# Patient Record
Sex: FEMALE | Race: WHITE | NOT HISPANIC OR LATINO | Employment: OTHER | URBAN - METROPOLITAN AREA
[De-identification: names, ages, dates, MRNs, and addresses within clinical notes are randomized per-mention and may not be internally consistent; named-entity substitution may affect disease eponyms.]

---

## 2017-01-04 PROBLEM — M81.0 OSTEOPOROSIS: Status: ACTIVE | Noted: 2017-01-04

## 2017-01-04 RX ORDER — SODIUM CHLORIDE 9 MG/ML
250 INJECTION, SOLUTION INTRAVENOUS ONCE
Status: CANCELLED | OUTPATIENT
Start: 2017-01-05

## 2017-01-05 ENCOUNTER — HOSPITAL ENCOUNTER (OUTPATIENT)
Dept: INFUSION THERAPY | Facility: HOSPITAL | Age: 67
Discharge: HOME | End: 2017-01-05
Admitting: INTERNAL MEDICINE

## 2017-01-05 VITALS
TEMPERATURE: 95.2 F | BODY MASS INDEX: 24.13 KG/M2 | WEIGHT: 145 LBS | SYSTOLIC BLOOD PRESSURE: 133 MMHG | HEART RATE: 66 BPM | RESPIRATION RATE: 20 BRPM | OXYGEN SATURATION: 96 % | DIASTOLIC BLOOD PRESSURE: 73 MMHG

## 2017-01-05 DIAGNOSIS — M81.0 OSTEOPOROSIS: ICD-10-CM

## 2017-01-05 PROCEDURE — 96365 THER/PROPH/DIAG IV INF INIT: CPT

## 2017-01-05 PROCEDURE — 25010000002 ZOLEDRONIC ACID 5 MG/100ML SOLUTION: Performed by: INTERNAL MEDICINE

## 2017-01-05 RX ORDER — SODIUM CHLORIDE 9 MG/ML
250 INJECTION, SOLUTION INTRAVENOUS ONCE
Status: CANCELLED | OUTPATIENT
Start: 2017-01-05

## 2017-01-05 RX ORDER — ZOLEDRONIC ACID 5 MG/100ML
5 INJECTION, SOLUTION INTRAVENOUS ONCE
Status: COMPLETED | OUTPATIENT
Start: 2017-01-05 | End: 2017-01-05

## 2017-01-05 RX ORDER — ZOLEDRONIC ACID 5 MG/100ML
5 INJECTION, SOLUTION INTRAVENOUS ONCE
Status: CANCELLED | OUTPATIENT
Start: 2017-01-05

## 2017-01-05 RX ADMIN — ZOLEDRONIC ACID 5 MG: 5 INJECTION, SOLUTION INTRAVENOUS at 07:37

## 2017-06-02 ENCOUNTER — HOSPITAL ENCOUNTER (OUTPATIENT)
Dept: CARDIOLOGY | Facility: HOSPITAL | Age: 67
Discharge: HOME | End: 2017-06-02
Attending: INTERNAL MEDICINE | Admitting: INTERNAL MEDICINE

## 2017-06-02 ENCOUNTER — OFFICE VISIT (OUTPATIENT)
Dept: CARDIOLOGY | Facility: CLINIC | Age: 67
End: 2017-06-02

## 2017-06-02 VITALS
WEIGHT: 144.4 LBS | SYSTOLIC BLOOD PRESSURE: 120 MMHG | DIASTOLIC BLOOD PRESSURE: 88 MMHG | HEART RATE: 61 BPM | HEIGHT: 65 IN | BODY MASS INDEX: 24.06 KG/M2

## 2017-06-02 VITALS
HEIGHT: 65 IN | DIASTOLIC BLOOD PRESSURE: 82 MMHG | SYSTOLIC BLOOD PRESSURE: 122 MMHG | BODY MASS INDEX: 24.16 KG/M2 | HEART RATE: 61 BPM | WEIGHT: 145 LBS

## 2017-06-02 DIAGNOSIS — I34.0 MITRAL VALVE INSUFFICIENCY: ICD-10-CM

## 2017-06-02 DIAGNOSIS — I34.0 NON-RHEUMATIC MITRAL REGURGITATION: Primary | ICD-10-CM

## 2017-06-02 LAB
ASCENDING AORTA: 2.9 CM
BH CV ECHO MEAS - ACS: 2 CM
BH CV ECHO MEAS - AO MAX PG (FULL): 4.3 MMHG
BH CV ECHO MEAS - AO MAX PG: 7.2 MMHG
BH CV ECHO MEAS - AO MEAN PG (FULL): 1.7 MMHG
BH CV ECHO MEAS - AO MEAN PG: 3.4 MMHG
BH CV ECHO MEAS - AO ROOT AREA (BSA CORRECTED): 2
BH CV ECHO MEAS - AO ROOT AREA: 8.9 CM^2
BH CV ECHO MEAS - AO ROOT DIAM: 3.4 CM
BH CV ECHO MEAS - AO V2 MAX: 133.8 CM/SEC
BH CV ECHO MEAS - AO V2 MEAN: 86.6 CM/SEC
BH CV ECHO MEAS - AO V2 VTI: 26.5 CM
BH CV ECHO MEAS - AVA(I,A): 1.5 CM^2
BH CV ECHO MEAS - AVA(I,D): 1.5 CM^2
BH CV ECHO MEAS - AVA(V,A): 1.5 CM^2
BH CV ECHO MEAS - AVA(V,D): 1.5 CM^2
BH CV ECHO MEAS - BSA(HAYCOCK): 1.7 M^2
BH CV ECHO MEAS - BSA: 1.7 M^2
BH CV ECHO MEAS - BZI_BMI: 24.1 KILOGRAMS/M^2
BH CV ECHO MEAS - BZI_METRIC_HEIGHT: 165.1 CM
BH CV ECHO MEAS - BZI_METRIC_WEIGHT: 65.8 KG
BH CV ECHO MEAS - CONTRAST EF (2CH): 66 ML/M^2
BH CV ECHO MEAS - CONTRAST EF 4CH: 64.5 ML/M^2
BH CV ECHO MEAS - EDV(MOD-SP2): 103 ML
BH CV ECHO MEAS - EDV(MOD-SP4): 121 ML
BH CV ECHO MEAS - EDV(TEICH): 109.7 ML
BH CV ECHO MEAS - EF(CUBED): 71.8 %
BH CV ECHO MEAS - EF(MOD-SP2): 66 %
BH CV ECHO MEAS - EF(MOD-SP4): 64.5 %
BH CV ECHO MEAS - EF(TEICH): 63.4 %
BH CV ECHO MEAS - ESV(MOD-SP2): 35 ML
BH CV ECHO MEAS - ESV(MOD-SP4): 43 ML
BH CV ECHO MEAS - ESV(TEICH): 40.1 ML
BH CV ECHO MEAS - FS: 34.5 %
BH CV ECHO MEAS - IVS/LVPW: 1.1
BH CV ECHO MEAS - IVSD: 1.1 CM
BH CV ECHO MEAS - LAT PEAK E' VEL: 6 CM/SEC
BH CV ECHO MEAS - LV DIASTOLIC VOL/BSA (35-75): 70.1 ML/M^2
BH CV ECHO MEAS - LV MASS(C)D: 196.3 GRAMS
BH CV ECHO MEAS - LV MASS(C)DI: 113.7 GRAMS/M^2
BH CV ECHO MEAS - LV MAX PG: 2.8 MMHG
BH CV ECHO MEAS - LV MEAN PG: 1.6 MMHG
BH CV ECHO MEAS - LV SYSTOLIC VOL/BSA (12-30): 24.9 ML/M^2
BH CV ECHO MEAS - LV V1 MAX: 84.4 CM/SEC
BH CV ECHO MEAS - LV V1 MEAN: 61.2 CM/SEC
BH CV ECHO MEAS - LV V1 VTI: 17 CM
BH CV ECHO MEAS - LVIDD: 4.8 CM
BH CV ECHO MEAS - LVIDS: 3.2 CM
BH CV ECHO MEAS - LVLD AP2: 6.2 CM
BH CV ECHO MEAS - LVLD AP4: 6.9 CM
BH CV ECHO MEAS - LVLS AP2: 5.4 CM
BH CV ECHO MEAS - LVLS AP4: 5.5 CM
BH CV ECHO MEAS - LVOT AREA (M): 2.3 CM^2
BH CV ECHO MEAS - LVOT AREA: 2.4 CM^2
BH CV ECHO MEAS - LVOT DIAM: 1.7 CM
BH CV ECHO MEAS - LVPWD: 1.1 CM
BH CV ECHO MEAS - MED PEAK E' VEL: 6 CM/SEC
BH CV ECHO MEAS - MR MAX PG: 161.4 MMHG
BH CV ECHO MEAS - MR MAX VEL: 635.3 CM/SEC
BH CV ECHO MEAS - MV A DUR: 0.1 SEC
BH CV ECHO MEAS - MV A MAX VEL: 108.7 CM/SEC
BH CV ECHO MEAS - MV DEC SLOPE: 291.6 CM/SEC^2
BH CV ECHO MEAS - MV DEC TIME: 0.27 SEC
BH CV ECHO MEAS - MV E MAX VEL: 78.6 CM/SEC
BH CV ECHO MEAS - MV E/A: 0.72
BH CV ECHO MEAS - MV MAX PG: 5.9 MMHG
BH CV ECHO MEAS - MV MEAN PG: 2 MMHG
BH CV ECHO MEAS - MV P1/2T MAX VEL: 78.6 CM/SEC
BH CV ECHO MEAS - MV P1/2T: 78.9 MSEC
BH CV ECHO MEAS - MV V2 MAX: 121.6 CM/SEC
BH CV ECHO MEAS - MV V2 MEAN: 65.7 CM/SEC
BH CV ECHO MEAS - MV V2 VTI: 37.2 CM
BH CV ECHO MEAS - MVA P1/2T LCG: 2.8 CM^2
BH CV ECHO MEAS - MVA(P1/2T): 2.8 CM^2
BH CV ECHO MEAS - MVA(VTI): 1.1 CM^2
BH CV ECHO MEAS - PA ACC TIME: 0.11 SEC
BH CV ECHO MEAS - PA MAX PG (FULL): 0.76 MMHG
BH CV ECHO MEAS - PA MAX PG: 1.6 MMHG
BH CV ECHO MEAS - PA PR(ACCEL): 31.5 MMHG
BH CV ECHO MEAS - PA V2 MAX: 62.4 CM/SEC
BH CV ECHO MEAS - PULM A REVS DUR: 0.1 SEC
BH CV ECHO MEAS - PULM A REVS VEL: 27.6 CM/SEC
BH CV ECHO MEAS - PULM DIAS VEL: 39.3 CM/SEC
BH CV ECHO MEAS - PULM S/D: 1.4
BH CV ECHO MEAS - PULM SYS VEL: 53.4 CM/SEC
BH CV ECHO MEAS - PVA(V,A): 2.3 CM^2
BH CV ECHO MEAS - PVA(V,D): 2.3 CM^2
BH CV ECHO MEAS - QP/QS: 0.77
BH CV ECHO MEAS - RAP SYSTOLE: 3 MMHG
BH CV ECHO MEAS - RV MAX PG: 0.8 MMHG
BH CV ECHO MEAS - RV MEAN PG: 0.51 MMHG
BH CV ECHO MEAS - RV V1 MAX: 44.6 CM/SEC
BH CV ECHO MEAS - RV V1 MEAN: 33.9 CM/SEC
BH CV ECHO MEAS - RV V1 VTI: 9.6 CM
BH CV ECHO MEAS - RVOT AREA: 3.3 CM^2
BH CV ECHO MEAS - RVOT DIAM: 2 CM
BH CV ECHO MEAS - RVSP: 15.5 MMHG
BH CV ECHO MEAS - SI(AO): 137.3 ML/M^2
BH CV ECHO MEAS - SI(CUBED): 47.2 ML/M^2
BH CV ECHO MEAS - SI(LVOT): 23.5 ML/M^2
BH CV ECHO MEAS - SI(MOD-SP2): 39.4 ML/M^2
BH CV ECHO MEAS - SI(MOD-SP4): 45.2 ML/M^2
BH CV ECHO MEAS - SI(TEICH): 40.3 ML/M^2
BH CV ECHO MEAS - SUP REN AO DIAM: 2.3 CM
BH CV ECHO MEAS - SV(AO): 237 ML
BH CV ECHO MEAS - SV(CUBED): 81.5 ML
BH CV ECHO MEAS - SV(LVOT): 40.5 ML
BH CV ECHO MEAS - SV(MOD-SP2): 68 ML
BH CV ECHO MEAS - SV(MOD-SP4): 78 ML
BH CV ECHO MEAS - SV(RVOT): 31.3 ML
BH CV ECHO MEAS - SV(TEICH): 69.6 ML
BH CV ECHO MEAS - TAPSE (>1.6): 2 CM2
BH CV ECHO MEAS - TR MAX VEL: 176.5 CM/SEC
BH CV XLRA - RV BASE: 3 CM
BH CV XLRA - TDI S': 14 CM/SEC
E/E' RATIO: 13
LEFT ATRIUM VOLUME INDEX: 53 ML/M2
LV EF 2D ECHO EST: 65 %
SINUS: 3 CM
STJ: 2.5 CM

## 2017-06-02 PROCEDURE — 93306 TTE W/DOPPLER COMPLETE: CPT | Performed by: INTERNAL MEDICINE

## 2017-06-02 PROCEDURE — 76376 3D RENDER W/INTRP POSTPROCES: CPT | Performed by: INTERNAL MEDICINE

## 2017-06-02 PROCEDURE — 93306 TTE W/DOPPLER COMPLETE: CPT

## 2017-06-02 PROCEDURE — 99214 OFFICE O/P EST MOD 30 MIN: CPT | Performed by: INTERNAL MEDICINE

## 2017-06-02 PROCEDURE — 93000 ELECTROCARDIOGRAM COMPLETE: CPT | Performed by: INTERNAL MEDICINE

## 2017-06-02 NOTE — PROGRESS NOTES
Date of Office Visit: 2017  Encounter Provider: Cam Hand MD  Place of Service: [facility] CARDIOLOGY  Patient Name: Rasheeda Balderas  :1950  [id number]    Chief Complaint   Patient presents with   • Coronary Artery Disease   :     HPI: Rasheeda Balderas is a 66 y.o. female   The patient comes in to see us for a followup of her mitral valve.  She is doing well.  She has no chest pain, shortness of breath, no PND, no orthopnea and no edema.   She is reasonably active.  She is going to retire in December from [employer] as an .  She has had, she says, a lifelong heart murmur.  She otherwise is doing well.           Past Medical History:   Diagnosis Date   • CAD (coronary artery disease)    • Mitral valve prolapse syndrome    • Osteoporosis        Past Surgical History:   Procedure Laterality Date   • SHOULDER SURGERY         Social History     Social History   • Marital status:      Spouse name: N/A   • Number of children: N/A   • Years of education: N/A     Occupational History   • Not on file.     Social History Main Topics   • Smoking status: Former Smoker   • Smokeless tobacco: Not on file      Comment: caffeine use   • Alcohol use No   • Drug use: No   • Sexual activity: Not on file     Other Topics Concern   • Not on file     Social History Narrative       Family History   Problem Relation Age of Onset   • No Known Problems Mother    • No Known Problems Father        Review of Systems   Constitution: Negative for decreased appetite, fever, malaise/fatigue and weight loss.   HENT: Negative for nosebleeds.    Eyes: Negative for double vision.   Cardiovascular: Negative for chest pain, claudication, cyanosis, dyspnea on exertion, irregular heartbeat, leg swelling, near-syncope, orthopnea, palpitations, paroxysmal nocturnal dyspnea and syncope.   Respiratory: Negative for cough, hemoptysis and shortness of breath.    Hematologic/Lymphatic: Negative for  "bleeding problem.   Skin: Negative for rash.   Musculoskeletal: Negative for falls and myalgias.   Gastrointestinal: Negative for hematochezia, jaundice, melena, nausea and vomiting.   Genitourinary: Negative for hematuria.   Neurological: Negative for dizziness and seizures.   Psychiatric/Behavioral: Negative for altered mental status and memory loss.       Allergies   Allergen Reactions   • No Known Drug Allergy          Current Outpatient Prescriptions:   •  acetaminophen (TYLENOL) 500 MG tablet, Take 500 mg by mouth every 6 (six) hours as needed for mild pain (1-3)., Disp: , Rfl:   •  aspirin 81 MG chewable tablet, Chew 81 mg daily., Disp: , Rfl:   •  Omega-3 Fatty Acids (FISH OIL) 1000 MG capsule capsule, Take  by mouth daily with breakfast., Disp: , Rfl:   •  simvastatin (ZOCOR) 20 MG tablet, Take 20 mg by mouth every night., Disp: , Rfl:   •  vitamin D (ERGOCALCIFEROL) 19671 UNITS capsule capsule, Take 50,000 Units by mouth 1 (one) time per week., Disp: , Rfl:   •  zoledronic acid (RECLAST) 5 MG/100ML solution injection, Infuse 5 mg into a venous catheter 1 (one) time. 1 time yearly, Disp: , Rfl:      Objective:     Vitals:    06/02/17 1101   BP: 120/88   Pulse: 61   Weight: 144 lb 6.4 oz (65.5 kg)   Height: 65\" (165.1 cm)     Body mass index is 24.03 kg/(m^2).    Physical Exam   Constitutional: She is oriented to person, place, and time. She appears well-developed and well-nourished.   HENT:   Head: Normocephalic.   Eyes: No scleral icterus.   Neck: No JVD present. No thyromegaly present.   Cardiovascular: Normal rate and regular rhythm.  Exam reveals no gallop and no friction rub.    Murmur heard.  High-pitched blowing decrescendo holosystolic murmur is present with a grade of 2/6  at the apex  Pulmonary/Chest: Effort normal and breath sounds normal. She has no wheezes. She has no rales.   Abdominal: Soft. There is no hepatosplenomegaly. There is no tenderness.   Musculoskeletal: Normal range of motion. She " exhibits no edema.   Lymphadenopathy:     She has no cervical adenopathy.   Neurological: She is alert and oriented to person, place, and time.   Skin: Skin is warm and dry. No rash noted.   Psychiatric: She has a normal mood and affect.         ECG 12 Lead  Date/Time: 6/2/2017 11:43 AM  Performed by: YUDY HAND  Authorized by: YUDY HAND   Comparison: compared with previous ECG   Similar to previous ECG  Rhythm: sinus rhythm  Clinical impression: normal ECG             Assessment:       Diagnosis Plan   1. Non-rheumatic mitral regurgitation  Adult Transthoracic Echo Complete          Plan:        Clinically she is doing quite well.  She is not having any symptoms. On her echo it does look like she has kind of severe MR to me.   It has fluctuated.   Her left ventricle  size is normal.  Her ventricular function is hyperdynamic.    She has a fair amount of mitral annular calcification. I think that would make a repair a little bit more difficult.  Because of that, I am going to continue to watch this.   I will have her come back in and see us in six months.  We will re-echo her then.            As always, it has been a pleasure to participate in your patient's care.      Sincerely,       Yudy Hand MD

## 2017-09-29 ENCOUNTER — APPOINTMENT (OUTPATIENT)
Dept: WOMENS IMAGING | Facility: HOSPITAL | Age: 67
End: 2017-09-29

## 2017-09-29 PROCEDURE — 77080 DXA BONE DENSITY AXIAL: CPT | Performed by: RADIOLOGY

## 2017-09-29 PROCEDURE — 77067 SCR MAMMO BI INCL CAD: CPT | Performed by: RADIOLOGY

## 2017-09-29 PROCEDURE — G0202 SCR MAMMO BI INCL CAD: HCPCS | Performed by: RADIOLOGY

## 2017-10-17 ENCOUNTER — TRANSCRIBE ORDERS (OUTPATIENT)
Dept: ADMINISTRATIVE | Facility: HOSPITAL | Age: 67
End: 2017-10-17

## 2017-10-17 DIAGNOSIS — R10.9 ABDOMINAL PAIN, UNSPECIFIED ABDOMINAL LOCATION: Primary | ICD-10-CM

## 2017-10-17 DIAGNOSIS — R19.7 DIARRHEA, UNSPECIFIED TYPE: ICD-10-CM

## 2017-10-18 ENCOUNTER — HOSPITAL ENCOUNTER (OUTPATIENT)
Dept: CT IMAGING | Facility: HOSPITAL | Age: 67
Discharge: HOME | End: 2017-10-18
Admitting: INTERNAL MEDICINE

## 2017-10-18 DIAGNOSIS — R10.9 ABDOMINAL PAIN, UNSPECIFIED ABDOMINAL LOCATION: ICD-10-CM

## 2017-10-18 DIAGNOSIS — R19.7 DIARRHEA, UNSPECIFIED TYPE: ICD-10-CM

## 2017-10-18 PROCEDURE — 74177 CT ABD & PELVIS W/CONTRAST: CPT

## 2017-10-18 PROCEDURE — 0 DIATRIZOATE MEGLUMINE & SODIUM PER 1 ML: Performed by: INTERNAL MEDICINE

## 2017-10-18 PROCEDURE — 0 IOPAMIDOL 61 % SOLUTION: Performed by: INTERNAL MEDICINE

## 2017-10-18 PROCEDURE — 82565 ASSAY OF CREATININE: CPT

## 2017-10-18 RX ADMIN — IOPAMIDOL 85 ML: 612 INJECTION, SOLUTION INTRAVENOUS at 08:45

## 2017-10-18 RX ADMIN — DIATRIZOATE MEGLUMINE AND DIATRIZOATE SODIUM 30 ML: 660; 100 LIQUID ORAL; RECTAL at 07:35

## 2017-10-19 LAB — CREAT BLDA-MCNC: 0.7 MG/DL (ref 0.6–1.3)

## 2017-10-24 ENCOUNTER — TRANSCRIBE ORDERS (OUTPATIENT)
Dept: ADMINISTRATIVE | Facility: HOSPITAL | Age: 67
End: 2017-10-24

## 2017-10-24 DIAGNOSIS — R19.7 DIARRHEA, UNSPECIFIED TYPE: Primary | ICD-10-CM

## 2017-10-24 DIAGNOSIS — R10.11 RIGHT UPPER QUADRANT PAIN: ICD-10-CM

## 2017-10-27 ENCOUNTER — HOSPITAL ENCOUNTER (OUTPATIENT)
Dept: NUCLEAR MEDICINE | Facility: HOSPITAL | Age: 67
Discharge: HOME | End: 2017-10-27

## 2017-10-27 DIAGNOSIS — R19.7 DIARRHEA, UNSPECIFIED TYPE: ICD-10-CM

## 2017-10-27 DIAGNOSIS — R10.11 RIGHT UPPER QUADRANT PAIN: ICD-10-CM

## 2017-11-03 ENCOUNTER — HOSPITAL ENCOUNTER (OUTPATIENT)
Dept: NUCLEAR MEDICINE | Facility: HOSPITAL | Age: 67
Discharge: HOME | End: 2017-11-03

## 2017-11-03 PROCEDURE — 25010000002 SINCALIDE PER 5 MCG: Performed by: INTERNAL MEDICINE

## 2017-11-03 PROCEDURE — 78227 HEPATOBIL SYST IMAGE W/DRUG: CPT

## 2017-11-03 PROCEDURE — A9537 TC99M MEBROFENIN: HCPCS | Performed by: INTERNAL MEDICINE

## 2017-11-03 PROCEDURE — 0 TECHNETIUM TC 99M MEBROFENIN KIT: Performed by: INTERNAL MEDICINE

## 2017-11-03 RX ORDER — KIT FOR THE PREPARATION OF TECHNETIUM TC 99M MEBROFENIN 45 MG/10ML
1 INJECTION, POWDER, LYOPHILIZED, FOR SOLUTION INTRAVENOUS
Status: COMPLETED | OUTPATIENT
Start: 2017-11-03 | End: 2017-11-03

## 2017-11-03 RX ADMIN — MEBROFENIN 1 DOSE: 45 INJECTION, POWDER, LYOPHILIZED, FOR SOLUTION INTRAVENOUS at 07:20

## 2017-11-03 RX ADMIN — SINCALIDE 1.3 MCG: 5 INJECTION, POWDER, LYOPHILIZED, FOR SOLUTION INTRAVENOUS at 09:00

## 2017-11-15 ENCOUNTER — OFFICE VISIT (OUTPATIENT)
Dept: SURGERY | Facility: CLINIC | Age: 67
End: 2017-11-15

## 2017-11-15 VITALS
DIASTOLIC BLOOD PRESSURE: 70 MMHG | WEIGHT: 138 LBS | OXYGEN SATURATION: 98 % | HEIGHT: 65 IN | BODY MASS INDEX: 22.99 KG/M2 | SYSTOLIC BLOOD PRESSURE: 115 MMHG | HEART RATE: 86 BPM

## 2017-11-15 DIAGNOSIS — K82.8 BILIARY DYSKINESIA: Primary | ICD-10-CM

## 2017-11-15 PROCEDURE — 99203 OFFICE O/P NEW LOW 30 MIN: CPT | Performed by: SURGERY

## 2017-11-15 RX ORDER — DIAZEPAM 5 MG/1
TABLET ORAL
COMMUNITY
Start: 2017-11-02 | End: 2017-12-29

## 2017-11-15 RX ORDER — AMPICILLIN TRIHYDRATE 250 MG
500 CAPSULE ORAL DAILY
COMMUNITY
End: 2019-06-10

## 2017-11-28 ENCOUNTER — OUTSIDE FACILITY SERVICE (OUTPATIENT)
Dept: SURGERY | Facility: CLINIC | Age: 67
End: 2017-11-28

## 2017-11-28 PROCEDURE — 88304 TISSUE EXAM BY PATHOLOGIST: CPT | Performed by: SURGERY

## 2017-11-28 PROCEDURE — 47563 LAPARO CHOLECYSTECTOMY/GRAPH: CPT | Performed by: SURGERY

## 2017-11-29 ENCOUNTER — LAB REQUISITION (OUTPATIENT)
Dept: LAB | Facility: HOSPITAL | Age: 67
End: 2017-11-29

## 2017-11-29 DIAGNOSIS — K82.8 OTHER SPECIFIED DISEASES OF GALLBLADDER (CODE): ICD-10-CM

## 2017-11-30 LAB
LAB AP CASE REPORT: NORMAL
LAB AP CLINICAL INFORMATION: NORMAL
Lab: NORMAL
PATH REPORT.FINAL DX SPEC: NORMAL
PATH REPORT.GROSS SPEC: NORMAL

## 2017-12-13 ENCOUNTER — OFFICE VISIT (OUTPATIENT)
Dept: SURGERY | Facility: CLINIC | Age: 67
End: 2017-12-13

## 2017-12-13 VITALS
SYSTOLIC BLOOD PRESSURE: 100 MMHG | WEIGHT: 133.8 LBS | DIASTOLIC BLOOD PRESSURE: 68 MMHG | OXYGEN SATURATION: 98 % | HEART RATE: 77 BPM | BODY MASS INDEX: 22.29 KG/M2 | HEIGHT: 65 IN

## 2017-12-13 DIAGNOSIS — Z09 FOLLOW UP: Primary | ICD-10-CM

## 2017-12-13 PROCEDURE — 99024 POSTOP FOLLOW-UP VISIT: CPT | Performed by: SURGERY

## 2017-12-13 NOTE — PROGRESS NOTES
"Chief complaint:  Post-op  Follow up    History of Present Illness    This is Rasheeda Balderas 67 y.o. status post laparoscopic cholecystectomy and is doing very well.  Patient denies fever, chills, nausea or vomiting.  Patient's pain is well-controlled.      The following portions of the patient's history were reviewed and updated as appropriate: allergies, current medications, past family history, past medical history, past social history, past surgical history and problem list.    Vitals:    12/13/17 0903   BP: 100/68   Pulse: 77   SpO2: 98%   Weight: 60.7 kg (133 lb 12.8 oz)   Height: 165.1 cm (65\")       Physical Exam  Incision is well-healed without evidence of infection, herniation or seroma.    Patient does not use tobacco products currently and I have counseled the patient not to start using tobacco products in the future.    Assessment/plan:    This is Rasheeda Balderas 67 y.o. status post laparoscopic cholecystectomy and is doing very well.  I have instructed the patient not lift greater than 10 pounds for total of 6 weeks from the time of surgery. I have instructed the patient follow-up as needed.    Hui Pickett MD  General, Minimally Invasive and Endoscopic Surgery  [facility]    [address], [address]  [place], [place], [address]  P: [phone]  F: [phone]    Cc:  Malini Simeon MD  "

## 2017-12-18 ENCOUNTER — TRANSCRIBE ORDERS (OUTPATIENT)
Dept: ADMINISTRATIVE | Facility: HOSPITAL | Age: 67
End: 2017-12-18

## 2017-12-18 DIAGNOSIS — M81.0 SENILE OSTEOPOROSIS: Primary | ICD-10-CM

## 2017-12-29 ENCOUNTER — HOSPITAL ENCOUNTER (OUTPATIENT)
Dept: CARDIOLOGY | Facility: HOSPITAL | Age: 67
Discharge: HOME | End: 2017-12-29
Attending: INTERNAL MEDICINE | Admitting: INTERNAL MEDICINE

## 2017-12-29 ENCOUNTER — OFFICE VISIT (OUTPATIENT)
Dept: CARDIOLOGY | Facility: CLINIC | Age: 67
End: 2017-12-29

## 2017-12-29 VITALS
HEIGHT: 65 IN | SYSTOLIC BLOOD PRESSURE: 107 MMHG | DIASTOLIC BLOOD PRESSURE: 75 MMHG | HEART RATE: 70 BPM | BODY MASS INDEX: 22.16 KG/M2 | WEIGHT: 133 LBS

## 2017-12-29 VITALS
HEART RATE: 66 BPM | BODY MASS INDEX: 22.89 KG/M2 | SYSTOLIC BLOOD PRESSURE: 100 MMHG | HEIGHT: 65 IN | WEIGHT: 137.4 LBS | DIASTOLIC BLOOD PRESSURE: 68 MMHG

## 2017-12-29 DIAGNOSIS — I34.81 MITRAL ANNULAR CALCIFICATION: ICD-10-CM

## 2017-12-29 DIAGNOSIS — I34.0 NON-RHEUMATIC MITRAL REGURGITATION: Primary | ICD-10-CM

## 2017-12-29 DIAGNOSIS — I34.0 NON-RHEUMATIC MITRAL REGURGITATION: ICD-10-CM

## 2017-12-29 DIAGNOSIS — I34.1 MITRAL VALVE PROLAPSE: ICD-10-CM

## 2017-12-29 LAB
ASCENDING AORTA: 2.6 CM
BH CV ECHO MEAS - ACS: 2.1 CM
BH CV ECHO MEAS - AO MAX PG (FULL): 7.1 MMHG
BH CV ECHO MEAS - AO MAX PG: 10.3 MMHG
BH CV ECHO MEAS - AO MEAN PG (FULL): 4.2 MMHG
BH CV ECHO MEAS - AO MEAN PG: 5.6 MMHG
BH CV ECHO MEAS - AO ROOT AREA (BSA CORRECTED): 1.9
BH CV ECHO MEAS - AO ROOT AREA: 8 CM^2
BH CV ECHO MEAS - AO ROOT DIAM: 3.2 CM
BH CV ECHO MEAS - AO V2 MAX: 160.7 CM/SEC
BH CV ECHO MEAS - AO V2 MEAN: 108.9 CM/SEC
BH CV ECHO MEAS - AO V2 VTI: 34.1 CM
BH CV ECHO MEAS - BSA(HAYCOCK): 1.7 M^2
BH CV ECHO MEAS - BSA: 1.7 M^2
BH CV ECHO MEAS - BZI_BMI: 22.2 KILOGRAMS/M^2
BH CV ECHO MEAS - BZI_METRIC_HEIGHT: 165 CM
BH CV ECHO MEAS - BZI_METRIC_WEIGHT: 60.3 KG
BH CV ECHO MEAS - CONTRAST EF 4CH: 53.4 ML/M^2
BH CV ECHO MEAS - EDV(MOD-SP4): 88 ML
BH CV ECHO MEAS - EDV(TEICH): 168.8 ML
BH CV ECHO MEAS - EF(CUBED): 62.1 %
BH CV ECHO MEAS - EF(MOD-SP4): 53.4 %
BH CV ECHO MEAS - EF(TEICH): 52.8 %
BH CV ECHO MEAS - ESV(MOD-SP4): 41 ML
BH CV ECHO MEAS - ESV(TEICH): 79.6 ML
BH CV ECHO MEAS - FS: 27.6 %
BH CV ECHO MEAS - IVS/LVPW: 0.96
BH CV ECHO MEAS - IVSD: 0.86 CM
BH CV ECHO MEAS - LAT PEAK E' VEL: 6 CM/SEC
BH CV ECHO MEAS - LV DIASTOLIC VOL/BSA (35-75): 52.9 ML/M^2
BH CV ECHO MEAS - LV MASS(C)D: 198.9 GRAMS
BH CV ECHO MEAS - LV MASS(C)DI: 119.6 GRAMS/M^2
BH CV ECHO MEAS - LV MAX PG: 3.2 MMHG
BH CV ECHO MEAS - LV MEAN PG: 1.4 MMHG
BH CV ECHO MEAS - LV SYSTOLIC VOL/BSA (12-30): 24.7 ML/M^2
BH CV ECHO MEAS - LV V1 MAX: 89.4 CM/SEC
BH CV ECHO MEAS - LV V1 MEAN: 54.7 CM/SEC
BH CV ECHO MEAS - LV V1 VTI: 17 CM
BH CV ECHO MEAS - LVIDD: 5.8 CM
BH CV ECHO MEAS - LVIDS: 4.2 CM
BH CV ECHO MEAS - LVLD AP4: 7 CM
BH CV ECHO MEAS - LVLS AP4: 5.9 CM
BH CV ECHO MEAS - LVPWD: 0.89 CM
BH CV ECHO MEAS - MED PEAK E' VEL: 6 CM/SEC
BH CV ECHO MEAS - MR MAX PG: 125.1 MMHG
BH CV ECHO MEAS - MR MAX VEL: 559.2 CM/SEC
BH CV ECHO MEAS - MV A DUR: 0.13 SEC
BH CV ECHO MEAS - MV A MAX VEL: 105.7 CM/SEC
BH CV ECHO MEAS - MV DEC SLOPE: 285.6 CM/SEC^2
BH CV ECHO MEAS - MV DEC TIME: 0.24 SEC
BH CV ECHO MEAS - MV E MAX VEL: 69.4 CM/SEC
BH CV ECHO MEAS - MV E/A: 0.66
BH CV ECHO MEAS - MV P1/2T MAX VEL: 70.8 CM/SEC
BH CV ECHO MEAS - MV P1/2T: 72.6 MSEC
BH CV ECHO MEAS - MVA P1/2T LCG: 3.1 CM^2
BH CV ECHO MEAS - MVA(P1/2T): 3 CM^2
BH CV ECHO MEAS - PA ACC TIME: 0.12 SEC
BH CV ECHO MEAS - PA MAX PG (FULL): 1.8 MMHG
BH CV ECHO MEAS - PA MAX PG: 3.6 MMHG
BH CV ECHO MEAS - PA PR(ACCEL): 25.1 MMHG
BH CV ECHO MEAS - PA V2 MAX: 95.3 CM/SEC
BH CV ECHO MEAS - PULM DIAS VEL: 34.9 CM/SEC
BH CV ECHO MEAS - PULM S/D: 1.5
BH CV ECHO MEAS - PULM SYS VEL: 51.4 CM/SEC
BH CV ECHO MEAS - RAP SYSTOLE: 3 MMHG
BH CV ECHO MEAS - RV MAX PG: 1.8 MMHG
BH CV ECHO MEAS - RV MEAN PG: 1.2 MMHG
BH CV ECHO MEAS - RV V1 MAX: 67.9 CM/SEC
BH CV ECHO MEAS - RV V1 MEAN: 52.7 CM/SEC
BH CV ECHO MEAS - RV V1 VTI: 14.9 CM
BH CV ECHO MEAS - RVSP: 21 MMHG
BH CV ECHO MEAS - SI(AO): 163.6 ML/M^2
BH CV ECHO MEAS - SI(CUBED): 74.1 ML/M^2
BH CV ECHO MEAS - SI(MOD-SP4): 28.3 ML/M^2
BH CV ECHO MEAS - SI(TEICH): 53.7 ML/M^2
BH CV ECHO MEAS - SUP REN AO DIAM: 2.5 CM
BH CV ECHO MEAS - SV(AO): 272.1 ML
BH CV ECHO MEAS - SV(CUBED): 123.3 ML
BH CV ECHO MEAS - SV(MOD-SP4): 47 ML
BH CV ECHO MEAS - SV(TEICH): 89.2 ML
BH CV ECHO MEAS - TAPSE (>1.6): 1.9 CM2
BH CV ECHO MEAS - TR MAX VEL: 210.7 CM/SEC
BH CV XLRA - RV BASE: 3.5 CM
BH CV XLRA - TDI S': 14 CM/SEC
E/E' RATIO: 11
LEFT ATRIUM VOLUME INDEX: 47 ML/M2
MAXIMAL PREDICTED HEART RATE: 154 BPM
SINUS: 3.1 CM
STJ: 2.7 CM
STRESS TARGET HR: 131 BPM

## 2017-12-29 PROCEDURE — 99214 OFFICE O/P EST MOD 30 MIN: CPT | Performed by: INTERNAL MEDICINE

## 2017-12-29 PROCEDURE — 93000 ELECTROCARDIOGRAM COMPLETE: CPT | Performed by: INTERNAL MEDICINE

## 2017-12-29 PROCEDURE — 93306 TTE W/DOPPLER COMPLETE: CPT | Performed by: INTERNAL MEDICINE

## 2017-12-29 PROCEDURE — 93306 TTE W/DOPPLER COMPLETE: CPT

## 2017-12-29 NOTE — PROGRESS NOTES
Date of Office Visit: 2017  Encounter Provider: Cam Hand MD  Place of Service: [facility] CARDIOLOGY  Patient Name: Rasheeda Balderas  :1950  [id number]    Chief Complaint   Patient presents with   • Coronary Artery Disease   :     HPI: Rasheeda Balderas is a 67 y.o. female  she has bileaflet prolapse of mitral and her calcification and at least moderate to moderate to severe mitral insufficiency.  We have been watching her because she's not had any symptoms and she continues to not have any shortness of breath PND orthopnea and edema syncope or palpitations she just retired from [employer] and is looking forward to longterm    Past Medical History:   Diagnosis Date   • Arthritis    • CAD (coronary artery disease)    • Cholelithiasis    • Heartburn    • Mitral valve prolapse syndrome    • Myocardial infarction    • Osteoporosis        Past Surgical History:   Procedure Laterality Date   • LAPAROSCOPIC CHOLECYSTECTOMY W/ CHOLANGIOGRAPHY     • SHOULDER SURGERY         Social History     Social History   • Marital status:      Spouse name: N/A   • Number of children: N/A   • Years of education: N/A     Occupational History   • professor      Social History Main Topics   • Smoking status: Former Smoker   • Smokeless tobacco: Never Used      Comment: caffeine use   • Alcohol use No   • Drug use: No   • Sexual activity: Defer     Other Topics Concern   • Not on file     Social History Narrative       Family History   Problem Relation Age of Onset   • Brain cancer Mother    • Heart disease Father        Review of Systems   Constitution: Negative for decreased appetite, fever, malaise/fatigue and weight loss.   HENT: Negative for nosebleeds.    Eyes: Negative for double vision.   Cardiovascular: Negative for chest pain, claudication, cyanosis, dyspnea on exertion, irregular heartbeat, leg swelling, near-syncope, orthopnea, palpitations, paroxysmal nocturnal dyspnea and syncope.  "  Respiratory: Negative for cough, hemoptysis and shortness of breath.    Hematologic/Lymphatic: Negative for bleeding problem.   Skin: Negative for rash.   Musculoskeletal: Negative for falls and myalgias.   Gastrointestinal: Negative for hematochezia, jaundice, melena, nausea and vomiting.   Genitourinary: Negative for hematuria.   Neurological: Negative for dizziness and seizures.   Psychiatric/Behavioral: Negative for altered mental status and memory loss.       Allergies   Allergen Reactions   • No Known Drug Allergy          Current Outpatient Prescriptions:   •  acetaminophen (TYLENOL) 500 MG tablet, Take 500 mg by mouth every 6 (six) hours as needed for mild pain (1-3)., Disp: , Rfl:   •  aspirin 81 MG chewable tablet, Chew 81 mg daily., Disp: , Rfl:   •  b complex-C-folic acid 1 MG capsule, Take 1 capsule by mouth Daily., Disp: , Rfl:   •  Cinnamon 500 MG capsule, Take 500 mg by mouth Daily., Disp: , Rfl:   •  Omega-3 Fatty Acids (FISH OIL) 1000 MG capsule capsule, Take  by mouth daily with breakfast., Disp: , Rfl:   •  simvastatin (ZOCOR) 20 MG tablet, Take 20 mg by mouth every night., Disp: , Rfl:   •  vitamin D (ERGOCALCIFEROL) 24699 UNITS capsule capsule, Take 50,000 Units by mouth 1 (one) time per week., Disp: , Rfl:   •  zoledronic acid (RECLAST) 5 MG/100ML solution injection, Infuse 5 mg into a venous catheter 1 (one) time. 1 time yearly, Disp: , Rfl:      Objective:     Vitals:    12/29/17 0941   BP: 100/68   Pulse: 66   Weight: 62.3 kg (137 lb 6.4 oz)   Height: 165.1 cm (65\")     Body mass index is 22.86 kg/(m^2).    Physical Exam   Constitutional: She is oriented to person, place, and time. She appears well-developed and well-nourished.   HENT:   Head: Normocephalic.   Eyes: No scleral icterus.   Neck: No JVD present. No thyromegaly present.   Cardiovascular: Normal rate and regular rhythm.  Exam reveals no gallop and no friction rub.    Murmur heard.  High-pitched blowing decrescendo holosystolic " murmur is present with a grade of 2/6  at the apex  Pulmonary/Chest: Effort normal and breath sounds normal. She has no wheezes. She has no rales.   Abdominal: Soft. There is no hepatosplenomegaly. There is no tenderness.   Musculoskeletal: Normal range of motion. She exhibits no edema.   Lymphadenopathy:     She has no cervical adenopathy.   Neurological: She is alert and oriented to person, place, and time.   Skin: Skin is warm and dry. No rash noted.   Psychiatric: She has a normal mood and affect.         ECG 12 Lead  Date/Time: 12/29/2017 10:03 AM  Performed by: YUDY HAND  Authorized by: YUDY HAND   Comparison: compared with previous ECG   Similar to previous ECG  Rhythm: sinus rhythm  Clinical impression: normal ECG             Assessment:       Diagnosis Plan   1. Non-rheumatic mitral regurgitation  Adult Transthoracic Echo Complete W/ Cont if Necessary Per Protocol   2. Mitral valve prolapse  Adult Transthoracic Echo Complete W/ Cont if Necessary Per Protocol   3. Mitral annular calcification  Adult Transthoracic Echo Complete W/ Cont if Necessary Per Protocol          Plan:       She is doing well.  She is asymptomatic.  I have taken an approach of watching her with serial echocardiograms every six months.  At times, the valve looks worse to me than just moderately regurgitant, but her left atrium is not dilated, her LV is not dilated and her pulmonary pressures are normal.  So, I am not going to make any change because I think a repair on her could be difficult because of her mitral annular calcification and so we are just going to watch her.  If she should get worse, the next step would be to do a transesophageal echocardiogram.          As always, it has been a pleasure to participate in your patient's care.      Sincerely,       Yudy Hand MD

## 2018-03-06 ENCOUNTER — HOSPITAL ENCOUNTER (OUTPATIENT)
Dept: INFUSION THERAPY | Facility: HOSPITAL | Age: 68
Discharge: HOME | End: 2018-03-06
Admitting: INTERNAL MEDICINE

## 2018-03-06 VITALS
BODY MASS INDEX: 23.32 KG/M2 | SYSTOLIC BLOOD PRESSURE: 118 MMHG | HEART RATE: 99 BPM | RESPIRATION RATE: 16 BRPM | TEMPERATURE: 95.9 F | OXYGEN SATURATION: 99 % | DIASTOLIC BLOOD PRESSURE: 58 MMHG | HEIGHT: 65 IN | WEIGHT: 140 LBS

## 2018-03-06 DIAGNOSIS — M81.0 AGE RELATED OSTEOPOROSIS, UNSPECIFIED PATHOLOGICAL FRACTURE PRESENCE: ICD-10-CM

## 2018-03-06 PROCEDURE — 25010000002 ZOLEDRONIC ACID 5 MG/100ML SOLUTION: Performed by: INTERNAL MEDICINE

## 2018-03-06 PROCEDURE — 96365 THER/PROPH/DIAG IV INF INIT: CPT

## 2018-03-06 RX ORDER — ZOLEDRONIC ACID 5 MG/100ML
5 INJECTION, SOLUTION INTRAVENOUS ONCE
Status: COMPLETED | OUTPATIENT
Start: 2018-03-06 | End: 2018-03-06

## 2018-03-06 RX ORDER — ZOLEDRONIC ACID 5 MG/100ML
5 INJECTION, SOLUTION INTRAVENOUS ONCE
Status: CANCELLED | OUTPATIENT
Start: 2018-03-06

## 2018-03-06 RX ADMIN — ZOLEDRONIC ACID 5 MG: 0.05 INJECTION, SOLUTION INTRAVENOUS at 11:28

## 2018-03-06 NOTE — PROGRESS NOTES
Creatinine Clearance 84.1 ccs/min per Cockcroft-Gault calculator.  Pt tolerated infusion without complaint.  Pt D/C per ambulation @ 1152.

## 2018-07-18 ENCOUNTER — OFFICE VISIT (OUTPATIENT)
Dept: CARDIOLOGY | Facility: CLINIC | Age: 68
End: 2018-07-18

## 2018-07-18 ENCOUNTER — HOSPITAL ENCOUNTER (OUTPATIENT)
Dept: CARDIOLOGY | Facility: HOSPITAL | Age: 68
Discharge: HOME | End: 2018-07-18
Attending: INTERNAL MEDICINE | Admitting: INTERNAL MEDICINE

## 2018-07-18 VITALS
HEART RATE: 71 BPM | HEIGHT: 65 IN | WEIGHT: 133 LBS | SYSTOLIC BLOOD PRESSURE: 112 MMHG | DIASTOLIC BLOOD PRESSURE: 70 MMHG | BODY MASS INDEX: 22.16 KG/M2 | OXYGEN SATURATION: 100 %

## 2018-07-18 VITALS
DIASTOLIC BLOOD PRESSURE: 74 MMHG | HEIGHT: 65 IN | SYSTOLIC BLOOD PRESSURE: 118 MMHG | WEIGHT: 140 LBS | HEART RATE: 67 BPM | BODY MASS INDEX: 23.32 KG/M2

## 2018-07-18 DIAGNOSIS — I34.81 MITRAL ANNULAR CALCIFICATION: ICD-10-CM

## 2018-07-18 DIAGNOSIS — I34.1 MITRAL VALVE PROLAPSE: ICD-10-CM

## 2018-07-18 DIAGNOSIS — I34.0 NON-RHEUMATIC MITRAL REGURGITATION: ICD-10-CM

## 2018-07-18 DIAGNOSIS — I34.0 NON-RHEUMATIC MITRAL REGURGITATION: Primary | ICD-10-CM

## 2018-07-18 LAB
ASCENDING AORTA: 2.9 CM
BH CV ECHO MEAS - ACS: 2.2 CM
BH CV ECHO MEAS - AO MAX PG (FULL): 6.1 MMHG
BH CV ECHO MEAS - AO MAX PG: 10.2 MMHG
BH CV ECHO MEAS - AO MEAN PG (FULL): 4.4 MMHG
BH CV ECHO MEAS - AO MEAN PG: 6.4 MMHG
BH CV ECHO MEAS - AO ROOT AREA (BSA CORRECTED): 1.8
BH CV ECHO MEAS - AO ROOT AREA: 7.6 CM^2
BH CV ECHO MEAS - AO ROOT DIAM: 3.1 CM
BH CV ECHO MEAS - AO V2 MAX: 160 CM/SEC
BH CV ECHO MEAS - AO V2 MEAN: 118.1 CM/SEC
BH CV ECHO MEAS - AO V2 VTI: 31.6 CM
BH CV ECHO MEAS - AVA(I,A): 1.6 CM^2
BH CV ECHO MEAS - AVA(I,D): 1.6 CM^2
BH CV ECHO MEAS - AVA(V,A): 1.7 CM^2
BH CV ECHO MEAS - AVA(V,D): 1.7 CM^2
BH CV ECHO MEAS - BSA(HAYCOCK): 1.7 M^2
BH CV ECHO MEAS - BSA: 1.7 M^2
BH CV ECHO MEAS - BZI_BMI: 23.3 KILOGRAMS/M^2
BH CV ECHO MEAS - BZI_METRIC_HEIGHT: 165.1 CM
BH CV ECHO MEAS - BZI_METRIC_WEIGHT: 63.5 KG
BH CV ECHO MEAS - CONTRAST EF (2CH): 60.9 ML/M^2
BH CV ECHO MEAS - CONTRAST EF 4CH: 52.8 ML/M^2
BH CV ECHO MEAS - EDV(MOD-SP2): 69 ML
BH CV ECHO MEAS - EDV(MOD-SP4): 89 ML
BH CV ECHO MEAS - EDV(TEICH): 171.2 ML
BH CV ECHO MEAS - EF(CUBED): 65.5 %
BH CV ECHO MEAS - EF(MOD-BP): 57 %
BH CV ECHO MEAS - EF(MOD-SP2): 60.9 %
BH CV ECHO MEAS - EF(MOD-SP4): 52.8 %
BH CV ECHO MEAS - EF(TEICH): 56.2 %
BH CV ECHO MEAS - ESV(MOD-SP2): 27 ML
BH CV ECHO MEAS - ESV(MOD-SP4): 42 ML
BH CV ECHO MEAS - ESV(TEICH): 74.9 ML
BH CV ECHO MEAS - IVS/LVPW: 1
BH CV ECHO MEAS - IVSD: 0.93 CM
BH CV ECHO MEAS - LAT PEAK E' VEL: 7 CM/SEC
BH CV ECHO MEAS - LV DIASTOLIC VOL/BSA (35-75): 52.4 ML/M^2
BH CV ECHO MEAS - LV MASS(C)D: 216.8 GRAMS
BH CV ECHO MEAS - LV MASS(C)DI: 127.5 GRAMS/M^2
BH CV ECHO MEAS - LV MAX PG: 4.2 MMHG
BH CV ECHO MEAS - LV MEAN PG: 1.9 MMHG
BH CV ECHO MEAS - LV SYSTOLIC VOL/BSA (12-30): 24.7 ML/M^2
BH CV ECHO MEAS - LV V1 MAX: 102.2 CM/SEC
BH CV ECHO MEAS - LV V1 MEAN: 62.8 CM/SEC
BH CV ECHO MEAS - LV V1 VTI: 18.8 CM
BH CV ECHO MEAS - LVIDD: 5.9 CM
BH CV ECHO MEAS - LVIDS: 4.1 CM
BH CV ECHO MEAS - LVLD AP2: 5.9 CM
BH CV ECHO MEAS - LVLD AP4: 5.9 CM
BH CV ECHO MEAS - LVLS AP2: 5.1 CM
BH CV ECHO MEAS - LVLS AP4: 5.1 CM
BH CV ECHO MEAS - LVOT AREA (M): 2.8 CM^2
BH CV ECHO MEAS - LVOT AREA: 2.7 CM^2
BH CV ECHO MEAS - LVOT DIAM: 1.9 CM
BH CV ECHO MEAS - LVPWD: 0.93 CM
BH CV ECHO MEAS - MED PEAK E' VEL: 7 CM/SEC
BH CV ECHO MEAS - MR MAX PG: 119.1 MMHG
BH CV ECHO MEAS - MR MAX VEL: 545.7 CM/SEC
BH CV ECHO MEAS - MR MEAN PG: 72.2 MMHG
BH CV ECHO MEAS - MR MEAN VEL: 389.1 CM/SEC
BH CV ECHO MEAS - MR VTI: 176.7 CM
BH CV ECHO MEAS - MV A DUR: 0.11 SEC
BH CV ECHO MEAS - MV A MAX VEL: 113.5 CM/SEC
BH CV ECHO MEAS - MV DEC SLOPE: 487.2 CM/SEC^2
BH CV ECHO MEAS - MV DEC TIME: 0.14 SEC
BH CV ECHO MEAS - MV E MAX VEL: 69.4 CM/SEC
BH CV ECHO MEAS - MV E/A: 0.61
BH CV ECHO MEAS - MV MAX PG: 6.8 MMHG
BH CV ECHO MEAS - MV MEAN PG: 2.5 MMHG
BH CV ECHO MEAS - MV P1/2T MAX VEL: 72.8 CM/SEC
BH CV ECHO MEAS - MV P1/2T: 43.7 MSEC
BH CV ECHO MEAS - MV V2 MAX: 130.3 CM/SEC
BH CV ECHO MEAS - MV V2 MEAN: 73.6 CM/SEC
BH CV ECHO MEAS - MV V2 VTI: 31.1 CM
BH CV ECHO MEAS - MVA P1/2T LCG: 3 CM^2
BH CV ECHO MEAS - MVA(P1/2T): 5 CM^2
BH CV ECHO MEAS - MVA(VTI): 1.6 CM^2
BH CV ECHO MEAS - PA ACC TIME: 0.11 SEC
BH CV ECHO MEAS - PA MAX PG (FULL): 1.4 MMHG
BH CV ECHO MEAS - PA MAX PG: 2.7 MMHG
BH CV ECHO MEAS - PA PR(ACCEL): 31.5 MMHG
BH CV ECHO MEAS - PA V2 MAX: 82.8 CM/SEC
BH CV ECHO MEAS - PULM DIAS VEL: 39.6 CM/SEC
BH CV ECHO MEAS - PULM S/D: 1.6
BH CV ECHO MEAS - PULM SYS VEL: 62.3 CM/SEC
BH CV ECHO MEAS - PVA(V,A): 2 CM^2
BH CV ECHO MEAS - PVA(V,D): 2 CM^2
BH CV ECHO MEAS - QP/QS: 0.55
BH CV ECHO MEAS - RAP SYSTOLE: 3 MMHG
BH CV ECHO MEAS - RV MAX PG: 1.4 MMHG
BH CV ECHO MEAS - RV MEAN PG: 0.72 MMHG
BH CV ECHO MEAS - RV V1 MAX: 58.2 CM/SEC
BH CV ECHO MEAS - RV V1 MEAN: 38.4 CM/SEC
BH CV ECHO MEAS - RV V1 VTI: 9.7 CM
BH CV ECHO MEAS - RVOT AREA: 2.9 CM^2
BH CV ECHO MEAS - RVOT DIAM: 1.9 CM
BH CV ECHO MEAS - RVSP: 19 MMHG
BH CV ECHO MEAS - SI(AO): 141.3 ML/M^2
BH CV ECHO MEAS - SI(CUBED): 78 ML/M^2
BH CV ECHO MEAS - SI(LVOT): 30.1 ML/M^2
BH CV ECHO MEAS - SI(MOD-SP2): 24.7 ML/M^2
BH CV ECHO MEAS - SI(MOD-SP4): 27.6 ML/M^2
BH CV ECHO MEAS - SI(TEICH): 56.6 ML/M^2
BH CV ECHO MEAS - SUP REN AO DIAM: 2.2 CM
BH CV ECHO MEAS - SV(AO): 240.3 ML
BH CV ECHO MEAS - SV(CUBED): 132.5 ML
BH CV ECHO MEAS - SV(LVOT): 51.2 ML
BH CV ECHO MEAS - SV(MOD-SP2): 42 ML
BH CV ECHO MEAS - SV(MOD-SP4): 47 ML
BH CV ECHO MEAS - SV(RVOT): 28.1 ML
BH CV ECHO MEAS - SV(TEICH): 96.3 ML
BH CV ECHO MEAS - TAPSE (>1.6): 2.2 CM2
BH CV ECHO MEAS - TR MAX VEL: 196.8 CM/SEC
BH CV ECHO MEASUREMENTS AVERAGE E/E' RATIO: 9.91
BH CV XLRA - RV BASE: 2.7 CM
BH CV XLRA - TDI S': 2.9 CM/SEC
LEFT ATRIUM VOLUME INDEX: 54 ML/M2
SINUS: 3.1 CM
STJ: 2.7 CM

## 2018-07-18 PROCEDURE — 93306 TTE W/DOPPLER COMPLETE: CPT

## 2018-07-18 PROCEDURE — 93000 ELECTROCARDIOGRAM COMPLETE: CPT | Performed by: PHYSICIAN ASSISTANT

## 2018-07-18 PROCEDURE — 93306 TTE W/DOPPLER COMPLETE: CPT | Performed by: INTERNAL MEDICINE

## 2018-07-18 PROCEDURE — 99213 OFFICE O/P EST LOW 20 MIN: CPT | Performed by: PHYSICIAN ASSISTANT

## 2018-07-18 NOTE — PROGRESS NOTES
Date of Office Visit: 2018  Encounter Provider: ANDREY Baugh  Place of Service: [facility] CARDIOLOGY  Patient Name: Rasheeda Balderas  :1950    Chief Complaint   Patient presents with   • Coronary Artery Disease     6 month follow up   :     HPI: Rasheeda Balderas is a 68 y.o. female, new to me, who presents today for follow-up.  Old records have been obtained and reviewed by me.  She is a patient of Dr. Hand's who we follow for mitral insufficiency.  In the past and has been moderate, on the border of moderate to severe.  She has mitral annular calcification as well.  She's been a symptomatic, so we have been following her with echocardiograms every 6 months.  She was last in our office to see Dr. Hand on 2017.  At that visit she was asymptomatic and doing well.  She just retired.  No changes were made to her medical regimen.  She is here today for 6 month follow-up.  She did have an echocardiogram today, I do not have the official report but at first glance it appears that her mitral regurgitation is unchanged.   Since she was last in our office she's been doing great.  She denies any chest pain, shortness of breath, palpitations, dizziness, or syncope.  She was walking over by [employer], and did become a little lightheaded.  It was very hot that day and she did not eat any breakfast.  She sat down to rest and lightheadedness went away.  She has not had an episode since.  This happened last week.  She eats a heart healthy diet.  She does stretching exercises 6 days a week and walks for at least an hour a day.      Past Medical History:   Diagnosis Date   • Arthritis    • CAD (coronary artery disease)    • Cholelithiasis    • Heartburn    • Mitral valve prolapse syndrome    • Myocardial infarction    • Osteoporosis        Past Surgical History:   Procedure Laterality Date   • LAPAROSCOPIC CHOLECYSTECTOMY W/ CHOLANGIOGRAPHY     • SHOULDER SURGERY          Social History     Social History   • Marital status:      Spouse name: N/A   • Number of children: N/A   • Years of education: N/A     Occupational History   • professor      Social History Main Topics   • Smoking status: Former Smoker   • Smokeless tobacco: Never Used      Comment: caffeine use   • Alcohol use No   • Drug use: No   • Sexual activity: Defer     Other Topics Concern   • Not on file     Social History Narrative   • No narrative on file       Family History   Problem Relation Age of Onset   • Brain cancer Mother    • Heart disease Father    • No Known Problems Maternal Grandmother    • No Known Problems Maternal Grandfather    • No Known Problems Paternal Grandmother    • No Known Problems Paternal Grandfather        Review of Systems   Constitution: Negative for chills, fever and malaise/fatigue.   Cardiovascular: Negative for chest pain, dyspnea on exertion, leg swelling, near-syncope, orthopnea, palpitations, paroxysmal nocturnal dyspnea and syncope.   Respiratory: Negative for cough and shortness of breath.    Musculoskeletal: Negative for joint pain, joint swelling and myalgias.   Gastrointestinal: Negative for abdominal pain, diarrhea, melena, nausea and vomiting.   Genitourinary: Negative for frequency and hematuria.   Neurological: Positive for light-headedness. Negative for numbness, paresthesias and seizures.   Allergic/Immunologic: Negative.    All other systems reviewed and are negative.      Allergies   Allergen Reactions   • No Known Drug Allergy          Current Outpatient Prescriptions:   •  acetaminophen (TYLENOL) 500 MG tablet, Take 500 mg by mouth every 6 (six) hours as needed for mild pain (1-3)., Disp: , Rfl:   •  aspirin 81 MG chewable tablet, Chew 81 mg daily., Disp: , Rfl:   •  b complex-C-folic acid 1 MG capsule, Take 1 capsule by mouth Daily., Disp: , Rfl:   •  Cinnamon 500 MG capsule, Take 500 mg by mouth Daily., Disp: , Rfl:   •  Omega-3 Fatty Acids (FISH OIL)  "1000 MG capsule capsule, Take  by mouth daily with breakfast., Disp: , Rfl:   •  simvastatin (ZOCOR) 20 MG tablet, Take 20 mg by mouth every night., Disp: , Rfl:   •  vitamin D (ERGOCALCIFEROL) 78754 UNITS capsule capsule, Take 50,000 Units by mouth 1 (one) time per week., Disp: , Rfl:   •  zoledronic acid (RECLAST) 5 MG/100ML solution injection, Infuse 5 mg into a venous catheter 1 (one) time. 1 time yearly, Disp: , Rfl:       Objective:     Vitals:    07/18/18 1236 07/18/18 1249   BP: 110/68 112/70   BP Location: Right arm Left arm   Pulse: 71    SpO2: 100%    Weight: 60.3 kg (133 lb)    Height: 165.1 cm (65\")      Body mass index is 22.13 kg/m².    PHYSICAL EXAM:    Physical Exam   Constitutional: She is oriented to person, place, and time. She appears well-developed and well-nourished. No distress.   HENT:   Head: Normocephalic and atraumatic.   Eyes: Pupils are equal, round, and reactive to light.   Neck: No JVD present. No thyromegaly present.   Cardiovascular: Normal rate, regular rhythm and intact distal pulses.    Murmur heard.  High-pitched blowing holosystolic murmur is present with a grade of 4/6  at the apex  Pulmonary/Chest: Effort normal and breath sounds normal. No respiratory distress.   Abdominal: Soft. Bowel sounds are normal. She exhibits no distension. There is no splenomegaly or hepatomegaly. There is no tenderness.   Musculoskeletal: Normal range of motion. She exhibits no edema.   Neurological: She is alert and oriented to person, place, and time.   Skin: Skin is warm and dry. She is not diaphoretic. No erythema.   Psychiatric: She has a normal mood and affect. Her behavior is normal. Judgment normal.         ECG 12 Lead  Date/Time: 7/18/2018 1:04 PM  Performed by: YUDELKA SELBY  Authorized by: YUDELKA SELBY   Comparison: compared with previous ECG from 12/29/2017  Similar to previous ECG  Rhythm: sinus rhythm  BPM: 71  Clinical impression: abnormal ECG  Comments: Sinus rhythm with " ectopic atrial beats.              Assessment:       Diagnosis Plan   1. Non-rheumatic mitral regurgitation  ECG 12 Lead    Adult Transthoracic Echo Complete W/ Cont if Necessary Per Protocol     Orders Placed This Encounter   Procedures   • ECG 12 Lead     This order was created via procedure documentation   • Adult Transthoracic Echo Complete W/ Cont if Necessary Per Protocol     Standing Status:   Future     Order Specific Question:   Reason for exam?     Answer:   Valvular Function     Order Specific Question:   Valvular Function specification?     Answer:   Native Valvular Regurg     Order Specific Question:   Native Valvular Regurg severity?     Answer:   Moderate          Plan:       Overall she is doing well.  She is still completely asymptomatic.  She does have some ectopic atrial beats on her ECG today, and this does make me concerned about the size of her left atrium.  Further recommendations will be made pending the results of her echocardiogram as well as a discussion with Dr. Hand.  Otherwise I'm just going to get her set up to come back in 6 months with a repeat echocardiogram per Dr. Hand's recommendations.    As always, it has been a pleasure to participate in your patient's care.      Sincerely,         Sarah Holley PA-C

## 2018-07-20 ENCOUNTER — TELEPHONE (OUTPATIENT)
Dept: CARDIOLOGY | Facility: CLINIC | Age: 68
End: 2018-07-20

## 2018-07-20 NOTE — TELEPHONE ENCOUNTER
----- Message from Cam Hand MD sent at 7/19/2018  5:18 PM EDT -----  It looks to me like the echo is unchanged if clinically she is unchanged I would just watch it for right now

## 2018-09-11 ENCOUNTER — APPOINTMENT (OUTPATIENT)
Dept: WOMENS IMAGING | Facility: HOSPITAL | Age: 68
End: 2018-09-11

## 2018-09-11 PROCEDURE — 77063 BREAST TOMOSYNTHESIS BI: CPT | Performed by: RADIOLOGY

## 2018-09-11 PROCEDURE — 77080 DXA BONE DENSITY AXIAL: CPT | Performed by: RADIOLOGY

## 2018-09-11 PROCEDURE — 77067 SCR MAMMO BI INCL CAD: CPT | Performed by: RADIOLOGY

## 2018-10-09 ENCOUNTER — APPOINTMENT (OUTPATIENT)
Dept: WOMENS IMAGING | Facility: HOSPITAL | Age: 68
End: 2018-10-09

## 2018-10-09 PROCEDURE — 77066 DX MAMMO INCL CAD BI: CPT | Performed by: RADIOLOGY

## 2018-10-09 PROCEDURE — 76641 ULTRASOUND BREAST COMPLETE: CPT | Performed by: RADIOLOGY

## 2018-10-09 PROCEDURE — MDREVIEWSP: Performed by: RADIOLOGY

## 2018-10-09 PROCEDURE — G0279 TOMOSYNTHESIS, MAMMO: HCPCS | Performed by: RADIOLOGY

## 2018-11-06 ENCOUNTER — TELEPHONE (OUTPATIENT)
Dept: SURGERY | Facility: CLINIC | Age: 68
End: 2018-11-06

## 2018-11-06 NOTE — TELEPHONE ENCOUNTER
New PT appointment with DR Polanco  11/29/18  Arrive 1:30    Mailed paper work    LM for pt to call  paige

## 2018-11-26 ENCOUNTER — OFFICE VISIT (OUTPATIENT)
Dept: SURGERY | Facility: CLINIC | Age: 68
End: 2018-11-26

## 2018-11-26 VITALS
HEIGHT: 65 IN | BODY MASS INDEX: 22.33 KG/M2 | OXYGEN SATURATION: 97 % | DIASTOLIC BLOOD PRESSURE: 74 MMHG | WEIGHT: 134 LBS | TEMPERATURE: 98.2 F | SYSTOLIC BLOOD PRESSURE: 116 MMHG | HEART RATE: 69 BPM

## 2018-11-26 DIAGNOSIS — R92.8 ABNORMAL ULTRASOUND OF BREAST: Primary | ICD-10-CM

## 2018-11-26 DIAGNOSIS — R92.8 ABNORMAL MAMMOGRAM: ICD-10-CM

## 2018-11-26 PROCEDURE — 99203 OFFICE O/P NEW LOW 30 MIN: CPT | Performed by: SURGERY

## 2018-12-03 ENCOUNTER — TELEPHONE (OUTPATIENT)
Dept: SURGERY | Facility: CLINIC | Age: 68
End: 2018-12-03

## 2018-12-03 NOTE — TELEPHONE ENCOUNTER
Pt called and confirmed imaging at [facility] and f/up appt with Dr. PORTER on 5/10/2019, pt to arrive  @ 8:45am     Pt expressed verbal understanding of these appointments

## 2019-02-08 ENCOUNTER — OFFICE VISIT (OUTPATIENT)
Dept: CARDIOLOGY | Facility: CLINIC | Age: 69
End: 2019-02-08

## 2019-02-08 ENCOUNTER — HOSPITAL ENCOUNTER (OUTPATIENT)
Dept: CARDIOLOGY | Facility: HOSPITAL | Age: 69
Discharge: HOME | End: 2019-02-08
Admitting: PHYSICIAN ASSISTANT

## 2019-02-08 VITALS
BODY MASS INDEX: 22.96 KG/M2 | SYSTOLIC BLOOD PRESSURE: 124 MMHG | WEIGHT: 137.8 LBS | HEIGHT: 65 IN | DIASTOLIC BLOOD PRESSURE: 84 MMHG | HEART RATE: 63 BPM

## 2019-02-08 VITALS
WEIGHT: 134 LBS | HEIGHT: 65 IN | BODY MASS INDEX: 22.33 KG/M2 | SYSTOLIC BLOOD PRESSURE: 118 MMHG | HEART RATE: 72 BPM | DIASTOLIC BLOOD PRESSURE: 76 MMHG

## 2019-02-08 DIAGNOSIS — R06.09 DYSPNEA ON EXERTION: ICD-10-CM

## 2019-02-08 DIAGNOSIS — I34.1 MITRAL VALVE PROLAPSE: Primary | ICD-10-CM

## 2019-02-08 DIAGNOSIS — I34.81 MITRAL ANNULAR CALCIFICATION: ICD-10-CM

## 2019-02-08 DIAGNOSIS — I34.0 NON-RHEUMATIC MITRAL REGURGITATION: ICD-10-CM

## 2019-02-08 LAB
ASCENDING AORTA: 3.3 CM
BH CV ECHO MEAS - ACS: 2.2 CM
BH CV ECHO MEAS - AO MAX PG (FULL): 4.4 MMHG
BH CV ECHO MEAS - AO MAX PG: 7.7 MMHG
BH CV ECHO MEAS - AO MEAN PG (FULL): 2.6 MMHG
BH CV ECHO MEAS - AO MEAN PG: 4.1 MMHG
BH CV ECHO MEAS - AO ROOT AREA (BSA CORRECTED): 1.9
BH CV ECHO MEAS - AO ROOT AREA: 8 CM^2
BH CV ECHO MEAS - AO ROOT DIAM: 3.2 CM
BH CV ECHO MEAS - AO V2 MAX: 138.8 CM/SEC
BH CV ECHO MEAS - AO V2 MEAN: 91.4 CM/SEC
BH CV ECHO MEAS - AO V2 VTI: 28.3 CM
BH CV ECHO MEAS - ASC AORTA: 3.3 CM
BH CV ECHO MEAS - AVA(I,A): 1.7 CM^2
BH CV ECHO MEAS - AVA(I,D): 1.7 CM^2
BH CV ECHO MEAS - AVA(V,A): 1.6 CM^2
BH CV ECHO MEAS - AVA(V,D): 1.6 CM^2
BH CV ECHO MEAS - BSA(HAYCOCK): 1.7 M^2
BH CV ECHO MEAS - BSA: 1.7 M^2
BH CV ECHO MEAS - BZI_BMI: 22.3 KILOGRAMS/M^2
BH CV ECHO MEAS - BZI_METRIC_HEIGHT: 165.1 CM
BH CV ECHO MEAS - BZI_METRIC_WEIGHT: 60.8 KG
BH CV ECHO MEAS - EDV(MOD-SP2): 85 ML
BH CV ECHO MEAS - EDV(MOD-SP4): 97 ML
BH CV ECHO MEAS - EDV(TEICH): 130.9 ML
BH CV ECHO MEAS - EF(CUBED): 65.9 %
BH CV ECHO MEAS - EF(MOD-BP): 61 %
BH CV ECHO MEAS - EF(MOD-SP2): 56.5 %
BH CV ECHO MEAS - EF(MOD-SP4): 64.9 %
BH CV ECHO MEAS - EF(TEICH): 57 %
BH CV ECHO MEAS - ESV(MOD-SP2): 37 ML
BH CV ECHO MEAS - ESV(MOD-SP4): 34 ML
BH CV ECHO MEAS - ESV(TEICH): 56.2 ML
BH CV ECHO MEAS - FS: 30.1 %
BH CV ECHO MEAS - IVS/LVPW: 1
BH CV ECHO MEAS - IVSD: 1.2 CM
BH CV ECHO MEAS - LAT PEAK E' VEL: 6 CM/SEC
BH CV ECHO MEAS - LV DIASTOLIC VOL/BSA (35-75): 58.1 ML/M^2
BH CV ECHO MEAS - LV MASS(C)D: 231.6 GRAMS
BH CV ECHO MEAS - LV MASS(C)DI: 138.8 GRAMS/M^2
BH CV ECHO MEAS - LV MAX PG: 3.3 MMHG
BH CV ECHO MEAS - LV MEAN PG: 1.5 MMHG
BH CV ECHO MEAS - LV SYSTOLIC VOL/BSA (12-30): 20.4 ML/M^2
BH CV ECHO MEAS - LV V1 MAX: 91.2 CM/SEC
BH CV ECHO MEAS - LV V1 MEAN: 52.7 CM/SEC
BH CV ECHO MEAS - LV V1 VTI: 19.3 CM
BH CV ECHO MEAS - LVIDD: 5.2 CM
BH CV ECHO MEAS - LVIDS: 3.6 CM
BH CV ECHO MEAS - LVLD AP2: 6.4 CM
BH CV ECHO MEAS - LVLD AP4: 6.9 CM
BH CV ECHO MEAS - LVLS AP2: 6 CM
BH CV ECHO MEAS - LVLS AP4: 6.4 CM
BH CV ECHO MEAS - LVOT AREA (M): 2.5 CM^2
BH CV ECHO MEAS - LVOT AREA: 2.5 CM^2
BH CV ECHO MEAS - LVOT DIAM: 1.8 CM
BH CV ECHO MEAS - LVPWD: 1.1 CM
BH CV ECHO MEAS - MED PEAK E' VEL: 5 CM/SEC
BH CV ECHO MEAS - MR MAX PG: 125.5 MMHG
BH CV ECHO MEAS - MR MAX VEL: 560.2 CM/SEC
BH CV ECHO MEAS - MV A DUR: 0.13 SEC
BH CV ECHO MEAS - MV A MAX VEL: 81.5 CM/SEC
BH CV ECHO MEAS - MV DEC SLOPE: 233 CM/SEC^2
BH CV ECHO MEAS - MV DEC TIME: 0.26 SEC
BH CV ECHO MEAS - MV E MAX VEL: 65.5 CM/SEC
BH CV ECHO MEAS - MV E/A: 0.8
BH CV ECHO MEAS - MV MAX PG: 10.3 MMHG
BH CV ECHO MEAS - MV MEAN PG: 2.5 MMHG
BH CV ECHO MEAS - MV P1/2T MAX VEL: 65 CM/SEC
BH CV ECHO MEAS - MV P1/2T: 81.7 MSEC
BH CV ECHO MEAS - MV V2 MAX: 160.4 CM/SEC
BH CV ECHO MEAS - MV V2 MEAN: 66.5 CM/SEC
BH CV ECHO MEAS - MV V2 VTI: 30.4 CM
BH CV ECHO MEAS - MVA P1/2T LCG: 3.4 CM^2
BH CV ECHO MEAS - MVA(P1/2T): 2.7 CM^2
BH CV ECHO MEAS - MVA(VTI): 1.6 CM^2
BH CV ECHO MEAS - PA ACC TIME: 0.15 SEC
BH CV ECHO MEAS - PA MAX PG (FULL): -0.23 MMHG
BH CV ECHO MEAS - PA MAX PG: 1.7 MMHG
BH CV ECHO MEAS - PA PR(ACCEL): 12.5 MMHG
BH CV ECHO MEAS - PA V2 MAX: 65.2 CM/SEC
BH CV ECHO MEAS - PULM A REVS DUR: 0.11 SEC
BH CV ECHO MEAS - PULM A REVS VEL: 29.1 CM/SEC
BH CV ECHO MEAS - PULM DIAS VEL: 38.1 CM/SEC
BH CV ECHO MEAS - PULM S/D: 1.4
BH CV ECHO MEAS - PULM SYS VEL: 52.3 CM/SEC
BH CV ECHO MEAS - PVA(V,A): 3 CM^2
BH CV ECHO MEAS - PVA(V,D): 3 CM^2
BH CV ECHO MEAS - QP/QS: 0.83
BH CV ECHO MEAS - RV MAX PG: 1.9 MMHG
BH CV ECHO MEAS - RV MEAN PG: 1.2 MMHG
BH CV ECHO MEAS - RV V1 MAX: 69.4 CM/SEC
BH CV ECHO MEAS - RV V1 MEAN: 49.9 CM/SEC
BH CV ECHO MEAS - RV V1 VTI: 14.2 CM
BH CV ECHO MEAS - RVOT AREA: 2.8 CM^2
BH CV ECHO MEAS - RVOT DIAM: 1.9 CM
BH CV ECHO MEAS - SI(AO): 135.5 ML/M^2
BH CV ECHO MEAS - SI(CUBED): 56.3 ML/M^2
BH CV ECHO MEAS - SI(LVOT): 28.8 ML/M^2
BH CV ECHO MEAS - SI(MOD-SP2): 28.8 ML/M^2
BH CV ECHO MEAS - SI(MOD-SP4): 37.8 ML/M^2
BH CV ECHO MEAS - SI(TEICH): 44.7 ML/M^2
BH CV ECHO MEAS - SUP REN AO DIAM: 1.8 CM
BH CV ECHO MEAS - SV(AO): 226.1 ML
BH CV ECHO MEAS - SV(CUBED): 94 ML
BH CV ECHO MEAS - SV(LVOT): 48 ML
BH CV ECHO MEAS - SV(MOD-SP2): 48 ML
BH CV ECHO MEAS - SV(MOD-SP4): 63 ML
BH CV ECHO MEAS - SV(RVOT): 39.7 ML
BH CV ECHO MEAS - SV(TEICH): 74.7 ML
BH CV ECHO MEAS - TAPSE (>1.6): 1.9 CM2
BH CV ECHO MEASUREMENTS AVERAGE E/E' RATIO: 11.91
BH CV XLRA - RV BASE: 2.7 CM
BH CV XLRA - TDI S': 14 CM/SEC
LEFT ATRIUM VOLUME INDEX: 30 ML/M2
SINUS: 3.9 CM
STJ: 3.2 CM

## 2019-02-08 PROCEDURE — 93306 TTE W/DOPPLER COMPLETE: CPT | Performed by: INTERNAL MEDICINE

## 2019-02-08 PROCEDURE — 93000 ELECTROCARDIOGRAM COMPLETE: CPT | Performed by: INTERNAL MEDICINE

## 2019-02-08 PROCEDURE — 93306 TTE W/DOPPLER COMPLETE: CPT

## 2019-02-08 PROCEDURE — 99214 OFFICE O/P EST MOD 30 MIN: CPT | Performed by: INTERNAL MEDICINE

## 2019-02-08 NOTE — PROGRESS NOTES
Date of Office Visit: 19  Encounter Provider: Cam Hand MD  Place of Service: [facility]  Patient Name: Rasheeda Balderas  :1950  [id number]    Chief Complaint   Patient presents with   • Coronary Artery Disease   :     HPI: Rasheeda Balderas is a 68 y.o. female  She is here for followup. She has a history of bileaflet mitral valve prolapse, mitral annular calcification and at least moderate to severe MR. She has been asymptomatic until here recently she has noticed some dyspnea on exertion, does not quite have the stamina that she used to have. No PND, no orthopnea, no chest pain, no palpitations, no syncope, no bleeding difficulty.         Past Medical History:   Diagnosis Date   • Arthritis    • CAD (coronary artery disease)    • Cholelithiasis    • Heartburn    • Mitral valve prolapse syndrome    • Myocardial infarction (CMS/HCC)    • Osteoporosis        Past Surgical History:   Procedure Laterality Date   • LAPAROSCOPIC CHOLECYSTECTOMY W/ CHOLANGIOGRAPHY     • SHOULDER SURGERY         Social History     Socioeconomic History   • Marital status:      Spouse name: Not on file   • Number of children: Not on file   • Years of education: Not on file   • Highest education level: Not on file   Social Needs   • Financial resource strain: Not on file   • Food insecurity - worry: Not on file   • Food insecurity - inability: Not on file   • Transportation needs - medical: Not on file   • Transportation needs - non-medical: Not on file   Occupational History   • Occupation: professor   Tobacco Use   • Smoking status: Former Smoker   • Smokeless tobacco: Never Used   • Tobacco comment: caffeine use   Substance and Sexual Activity   • Alcohol use: Yes     Comment: weekends-socially   • Drug use: No   • Sexual activity: Defer   Other Topics Concern   • Not on file   Social History Narrative   • Not on file       Family History   Problem Relation Age of Onset   • Brain  cancer Mother    • Heart disease Father    • Brain cancer Father    • Stomach cancer Maternal Grandmother    • Cancer Maternal Grandfather    • No Known Problems Paternal Grandmother    • No Known Problems Paternal Grandfather        Review of Systems   Constitution: Negative for decreased appetite, fever, malaise/fatigue and weight loss.   HENT: Negative for nosebleeds.    Eyes: Negative for double vision.   Cardiovascular: Negative for chest pain, claudication, cyanosis, dyspnea on exertion, irregular heartbeat, leg swelling, near-syncope, orthopnea, palpitations, paroxysmal nocturnal dyspnea and syncope.   Respiratory: Negative for cough, hemoptysis and shortness of breath.    Hematologic/Lymphatic: Negative for bleeding problem.   Skin: Negative for rash.   Musculoskeletal: Negative for falls and myalgias.   Gastrointestinal: Negative for hematochezia, jaundice, melena, nausea and vomiting.   Genitourinary: Negative for hematuria.   Neurological: Negative for dizziness and seizures.   Psychiatric/Behavioral: Negative for altered mental status and memory loss.       Allergies   Allergen Reactions   • No Known Drug Allergy          Current Outpatient Medications:   •  acetaminophen (TYLENOL) 500 MG tablet, Take 500 mg by mouth every 6 (six) hours as needed for mild pain (1-3)., Disp: , Rfl:   •  aspirin 81 MG chewable tablet, Chew 81 mg daily., Disp: , Rfl:   •  b complex-C-folic acid 1 MG capsule, Take 1 capsule by mouth Daily., Disp: , Rfl:   •  Cinnamon 500 MG capsule, Take 500 mg by mouth Daily., Disp: , Rfl:   •  simvastatin (ZOCOR) 20 MG tablet, Take 20 mg by mouth every night., Disp: , Rfl:   •  vitamin D (ERGOCALCIFEROL) 41794 UNITS capsule capsule, Take 50,000 Units by mouth 1 (one) time per week., Disp: , Rfl:   •  zoledronic acid (RECLAST) 5 MG/100ML solution injection, Infuse 5 mg into a venous catheter 1 (one) time. 1 time yearly, Disp: , Rfl:      Objective:     Vitals:    02/08/19 1349   BP: 124/84  "  Pulse: 63   Weight: 62.5 kg (137 lb 12.8 oz)   Height: 165.1 cm (65\")     Body mass index is 22.93 kg/m².    Physical Exam   Constitutional: She is oriented to person, place, and time. She appears well-developed and well-nourished.   HENT:   Head: Normocephalic.   Eyes: No scleral icterus.   Neck: No JVD present. No thyromegaly present.   Cardiovascular: Normal rate and regular rhythm. Exam reveals no gallop and no friction rub.   Murmur heard.  High-pitched blowing decrescendo holosystolic murmur is present with a grade of 2/6 at the apex.  Best heard in the L posterior chest   Pulmonary/Chest: Effort normal and breath sounds normal. She has no wheezes. She has no rales.   Abdominal: Soft. There is no hepatosplenomegaly. There is no tenderness.   Musculoskeletal: Normal range of motion. She exhibits no edema.   Lymphadenopathy:     She has no cervical adenopathy.   Neurological: She is alert and oriented to person, place, and time.   Skin: Skin is warm and dry. No rash noted.   Psychiatric: She has a normal mood and affect.         ECG 12 Lead  Date/Time: 2/8/2019 2:31 PM  Performed by: Cam Hand MD  Authorized by: Cam Hand MD   Rhythm: sinus rhythm  Ectopy: atrial premature contractions  Clinical impression: abnormal ECG             Assessment:       Diagnosis Plan   1. Mitral valve prolapse  Adult Transesophageal Echo (ROBBIE) W/ Cont if Necessary Per Protocol   2. Non-rheumatic mitral regurgitation  Adult Transesophageal Echo (ROBBIE) W/ Cont if Necessary Per Protocol   3. Mitral annular calcification  Adult Transesophageal Echo (ROBBIE) W/ Cont if Necessary Per Protocol   4. Dyspnea on exertion            Plan:       I am a little bit bothered that she has the symptoms and in reviewing her echo I think her LV function is starting to get a little bit mildly reduced. I realize the symptoms law measures it as normal, but it seems to me that it is a little bit sluggish. I also think she has a lot of MR. I " am going to pursue a ROBBIE on her. She is interested, if she needs it, in having surgery at a center of mitral valve repair excellence and of course would like to have less invasive approaches if possible but the first step is to get a ROBBIE.         As always, it has been a pleasure to participate in your patient's care.      Sincerely,       Cam Hand MD

## 2019-02-21 ENCOUNTER — TRANSCRIBE ORDERS (OUTPATIENT)
Dept: CARDIOLOGY | Facility: CLINIC | Age: 69
End: 2019-02-21

## 2019-02-21 ENCOUNTER — LAB (OUTPATIENT)
Dept: LAB | Facility: HOSPITAL | Age: 69
End: 2019-02-21

## 2019-02-21 DIAGNOSIS — Z13.6 SCREENING FOR ISCHEMIC HEART DISEASE: ICD-10-CM

## 2019-02-21 DIAGNOSIS — I34.1 MITRAL VALVE PROLAPSE: ICD-10-CM

## 2019-02-21 DIAGNOSIS — Z01.810 PRE-OPERATIVE CARDIOVASCULAR EXAMINATION: ICD-10-CM

## 2019-02-21 DIAGNOSIS — Z01.810 PRE-OPERATIVE CARDIOVASCULAR EXAMINATION: Primary | ICD-10-CM

## 2019-02-21 LAB
ANION GAP SERPL CALCULATED.3IONS-SCNC: 11.7 MMOL/L
BASOPHILS # BLD AUTO: 0.04 10*3/MM3 (ref 0–0.2)
BASOPHILS NFR BLD AUTO: 0.8 % (ref 0–1.5)
BUN BLD-MCNC: 12 MG/DL (ref 8–23)
BUN/CREAT SERPL: 16.2 (ref 7–25)
CALCIUM SPEC-SCNC: 9.7 MG/DL (ref 8.6–10.5)
CHLORIDE SERPL-SCNC: 102 MMOL/L (ref 98–107)
CO2 SERPL-SCNC: 25.3 MMOL/L (ref 22–29)
CREAT BLD-MCNC: 0.74 MG/DL (ref 0.57–1)
DEPRECATED RDW RBC AUTO: 43.3 FL (ref 37–54)
EOSINOPHIL # BLD AUTO: 0.08 10*3/MM3 (ref 0–0.4)
EOSINOPHIL NFR BLD AUTO: 1.5 % (ref 0.3–6.2)
ERYTHROCYTE [DISTWIDTH] IN BLOOD BY AUTOMATED COUNT: 12 % (ref 12.3–15.4)
GFR SERPL CREATININE-BSD FRML MDRD: 78 ML/MIN/1.73
GLUCOSE BLD-MCNC: 101 MG/DL (ref 65–99)
HCT VFR BLD AUTO: 39.8 % (ref 34–46.6)
HGB BLD-MCNC: 13 G/DL (ref 12–15.9)
IMM GRANULOCYTES # BLD AUTO: 0.02 10*3/MM3 (ref 0–0.05)
IMM GRANULOCYTES NFR BLD AUTO: 0.4 % (ref 0–0.5)
LYMPHOCYTES # BLD AUTO: 1.63 10*3/MM3 (ref 0.7–3.1)
LYMPHOCYTES NFR BLD AUTO: 31.3 % (ref 19.6–45.3)
MCH RBC QN AUTO: 31.8 PG (ref 26.6–33)
MCHC RBC AUTO-ENTMCNC: 32.7 G/DL (ref 31.5–35.7)
MCV RBC AUTO: 97.3 FL (ref 79–97)
MONOCYTES # BLD AUTO: 0.32 10*3/MM3 (ref 0.1–0.9)
MONOCYTES NFR BLD AUTO: 6.1 % (ref 5–12)
NEUTROPHILS # BLD AUTO: 3.12 10*3/MM3 (ref 1.4–7)
NEUTROPHILS NFR BLD AUTO: 59.9 % (ref 42.7–76)
NRBC BLD AUTO-RTO: 0 /100 WBC (ref 0–0)
PLATELET # BLD AUTO: 194 10*3/MM3 (ref 140–450)
PMV BLD AUTO: 10.1 FL (ref 6–12)
POTASSIUM BLD-SCNC: 4.8 MMOL/L (ref 3.5–5.2)
RBC # BLD AUTO: 4.09 10*6/MM3 (ref 3.77–5.28)
SODIUM BLD-SCNC: 139 MMOL/L (ref 136–145)
WBC NRBC COR # BLD: 5.21 10*3/MM3 (ref 3.4–10.8)

## 2019-02-21 PROCEDURE — 85025 COMPLETE CBC W/AUTO DIFF WBC: CPT

## 2019-02-21 PROCEDURE — 36415 COLL VENOUS BLD VENIPUNCTURE: CPT

## 2019-02-21 PROCEDURE — 80048 BASIC METABOLIC PNL TOTAL CA: CPT

## 2019-02-25 ENCOUNTER — HOSPITAL ENCOUNTER (OUTPATIENT)
Dept: CARDIOLOGY | Facility: HOSPITAL | Age: 69
Discharge: HOME | End: 2019-02-25
Admitting: INTERNAL MEDICINE

## 2019-02-25 ENCOUNTER — HOSPITAL ENCOUNTER (OUTPATIENT)
Dept: CARDIOLOGY | Facility: HOSPITAL | Age: 69
Discharge: HOME | End: 2019-02-25

## 2019-02-25 VITALS
OXYGEN SATURATION: 97 % | RESPIRATION RATE: 16 BRPM | DIASTOLIC BLOOD PRESSURE: 68 MMHG | HEART RATE: 80 BPM | SYSTOLIC BLOOD PRESSURE: 108 MMHG

## 2019-02-25 VITALS
DIASTOLIC BLOOD PRESSURE: 58 MMHG | HEART RATE: 76 BPM | SYSTOLIC BLOOD PRESSURE: 100 MMHG | RESPIRATION RATE: 11 BRPM | OXYGEN SATURATION: 96 %

## 2019-02-25 DIAGNOSIS — I34.81 MITRAL ANNULAR CALCIFICATION: ICD-10-CM

## 2019-02-25 DIAGNOSIS — I34.0 NON-RHEUMATIC MITRAL REGURGITATION: ICD-10-CM

## 2019-02-25 DIAGNOSIS — I34.1 MITRAL VALVE PROLAPSE: ICD-10-CM

## 2019-02-25 LAB
BH CV ECHO MEAS - BSA(HAYCOCK): 1.7 M^2
BH CV ECHO MEAS - BSA: 1.7 M^2
BH CV ECHO MEAS - BZI_BMI: 22.5 KILOGRAMS/M^2
BH CV ECHO MEAS - BZI_METRIC_HEIGHT: 165.1 CM
BH CV ECHO MEAS - BZI_METRIC_WEIGHT: 61.2 KG

## 2019-02-25 PROCEDURE — 93321 DOPPLER ECHO F-UP/LMTD STD: CPT | Performed by: INTERNAL MEDICINE

## 2019-02-25 PROCEDURE — 93321 DOPPLER ECHO F-UP/LMTD STD: CPT

## 2019-02-25 PROCEDURE — 99152 MOD SED SAME PHYS/QHP 5/>YRS: CPT

## 2019-02-25 PROCEDURE — 93312 ECHO TRANSESOPHAGEAL: CPT | Performed by: INTERNAL MEDICINE

## 2019-02-25 PROCEDURE — 36415 COLL VENOUS BLD VENIPUNCTURE: CPT

## 2019-02-25 PROCEDURE — 93325 DOPPLER ECHO COLOR FLOW MAPG: CPT | Performed by: INTERNAL MEDICINE

## 2019-02-25 PROCEDURE — 93312 ECHO TRANSESOPHAGEAL: CPT

## 2019-02-25 PROCEDURE — 93325 DOPPLER ECHO COLOR FLOW MAPG: CPT

## 2019-02-25 PROCEDURE — 25010000002 FENTANYL CITRATE (PF) 100 MCG/2ML SOLUTION: Performed by: INTERNAL MEDICINE

## 2019-02-25 PROCEDURE — 25010000002 MIDAZOLAM PER 1 MG: Performed by: INTERNAL MEDICINE

## 2019-02-25 RX ORDER — MIDAZOLAM HYDROCHLORIDE 1 MG/ML
INJECTION INTRAMUSCULAR; INTRAVENOUS
Status: COMPLETED | OUTPATIENT
Start: 2019-02-25 | End: 2019-02-25

## 2019-02-25 RX ORDER — FENTANYL CITRATE 50 UG/ML
INJECTION, SOLUTION INTRAMUSCULAR; INTRAVENOUS
Status: COMPLETED | OUTPATIENT
Start: 2019-02-25 | End: 2019-02-25

## 2019-02-25 RX ORDER — SODIUM CHLORIDE 9 MG/ML
INJECTION, SOLUTION INTRAVENOUS
Status: COMPLETED | OUTPATIENT
Start: 2019-02-25 | End: 2019-02-25

## 2019-02-25 RX ADMIN — MIDAZOLAM 1 MG: 1 INJECTION INTRAMUSCULAR; INTRAVENOUS at 07:14

## 2019-02-25 RX ADMIN — MIDAZOLAM 1 MG: 1 INJECTION INTRAMUSCULAR; INTRAVENOUS at 07:12

## 2019-02-25 RX ADMIN — MIDAZOLAM 1 MG: 1 INJECTION INTRAMUSCULAR; INTRAVENOUS at 07:17

## 2019-02-25 RX ADMIN — FENTANYL CITRATE 25 MCG: 50 INJECTION INTRAMUSCULAR; INTRAVENOUS at 07:12

## 2019-02-25 RX ADMIN — BENZOCAINE, BUTAMBEN, AND TETRACAINE HYDROCHLORIDE 1 SPRAY: .028; .004; .004 AEROSOL, SPRAY TOPICAL at 07:10

## 2019-02-25 RX ADMIN — FENTANYL CITRATE 25 MCG: 50 INJECTION INTRAMUSCULAR; INTRAVENOUS at 07:14

## 2019-02-25 RX ADMIN — FENTANYL CITRATE 25 MCG: 50 INJECTION INTRAMUSCULAR; INTRAVENOUS at 07:09

## 2019-02-25 RX ADMIN — SODIUM CHLORIDE 9 ML/HR: 9 INJECTION, SOLUTION INTRAVENOUS at 07:00

## 2019-02-25 RX ADMIN — MIDAZOLAM 2 MG: 1 INJECTION INTRAMUSCULAR; INTRAVENOUS at 07:09

## 2019-02-26 ENCOUNTER — TELEPHONE (OUTPATIENT)
Dept: CARDIOLOGY | Facility: CLINIC | Age: 69
End: 2019-02-26

## 2019-02-26 ENCOUNTER — TRANSCRIBE ORDERS (OUTPATIENT)
Dept: ADMINISTRATIVE | Facility: HOSPITAL | Age: 69
End: 2019-02-26

## 2019-02-26 DIAGNOSIS — I34.0 NON-RHEUMATIC MITRAL REGURGITATION: Primary | ICD-10-CM

## 2019-02-26 DIAGNOSIS — M81.0 OSTEOPOROSIS OF MULTIPLE SITES: Primary | ICD-10-CM

## 2019-02-27 PROBLEM — I34.0 NON-RHEUMATIC MITRAL REGURGITATION: Status: ACTIVE | Noted: 2019-02-27

## 2019-03-07 ENCOUNTER — HOSPITAL ENCOUNTER (OUTPATIENT)
Facility: HOSPITAL | Age: 69
Setting detail: HOSPITAL OUTPATIENT SURGERY
Discharge: HOME | End: 2019-03-07
Attending: INTERNAL MEDICINE | Admitting: INTERNAL MEDICINE

## 2019-03-07 VITALS
HEIGHT: 65 IN | HEART RATE: 68 BPM | WEIGHT: 132.56 LBS | OXYGEN SATURATION: 97 % | DIASTOLIC BLOOD PRESSURE: 58 MMHG | SYSTOLIC BLOOD PRESSURE: 96 MMHG | TEMPERATURE: 98.6 F | BODY MASS INDEX: 22.09 KG/M2 | RESPIRATION RATE: 16 BRPM

## 2019-03-07 DIAGNOSIS — I34.0 NON-RHEUMATIC MITRAL REGURGITATION: ICD-10-CM

## 2019-03-07 LAB
HCT VFR BLDA CALC: 34 % (ref 38–51)
HGB BLDA-MCNC: 11.6 G/DL (ref 12–17)
SAO2 % BLDA: 69 % (ref 95–98)

## 2019-03-07 PROCEDURE — 85018 HEMOGLOBIN: CPT

## 2019-03-07 PROCEDURE — 25010000002 FENTANYL CITRATE (PF) 100 MCG/2ML SOLUTION: Performed by: INTERNAL MEDICINE

## 2019-03-07 PROCEDURE — C1769 GUIDE WIRE: HCPCS | Performed by: INTERNAL MEDICINE

## 2019-03-07 PROCEDURE — 25010000002 MIDAZOLAM PER 1 MG: Performed by: INTERNAL MEDICINE

## 2019-03-07 PROCEDURE — 0 IOPAMIDOL PER 1 ML: Performed by: INTERNAL MEDICINE

## 2019-03-07 PROCEDURE — 85014 HEMATOCRIT: CPT

## 2019-03-07 PROCEDURE — C1894 INTRO/SHEATH, NON-LASER: HCPCS | Performed by: INTERNAL MEDICINE

## 2019-03-07 PROCEDURE — 93460 R&L HRT ART/VENTRICLE ANGIO: CPT | Performed by: INTERNAL MEDICINE

## 2019-03-07 PROCEDURE — 99152 MOD SED SAME PHYS/QHP 5/>YRS: CPT | Performed by: INTERNAL MEDICINE

## 2019-03-07 PROCEDURE — 99153 MOD SED SAME PHYS/QHP EA: CPT | Performed by: INTERNAL MEDICINE

## 2019-03-07 RX ORDER — FENTANYL CITRATE 50 UG/ML
INJECTION, SOLUTION INTRAMUSCULAR; INTRAVENOUS AS NEEDED
Status: DISCONTINUED | OUTPATIENT
Start: 2019-03-07 | End: 2019-03-07 | Stop reason: HOSPADM

## 2019-03-07 RX ORDER — SODIUM CHLORIDE 9 MG/ML
100 INJECTION, SOLUTION INTRAVENOUS CONTINUOUS
Status: DISCONTINUED | OUTPATIENT
Start: 2019-03-07 | End: 2019-03-07 | Stop reason: HOSPADM

## 2019-03-07 RX ORDER — SODIUM CHLORIDE 0.9 % (FLUSH) 0.9 %
3 SYRINGE (ML) INJECTION EVERY 12 HOURS SCHEDULED
Status: DISCONTINUED | OUTPATIENT
Start: 2019-03-07 | End: 2019-03-07 | Stop reason: HOSPADM

## 2019-03-07 RX ORDER — LIDOCAINE HYDROCHLORIDE 10 MG/ML
0.1 INJECTION, SOLUTION EPIDURAL; INFILTRATION; INTRACAUDAL; PERINEURAL ONCE AS NEEDED
Status: DISCONTINUED | OUTPATIENT
Start: 2019-03-07 | End: 2019-03-07 | Stop reason: HOSPADM

## 2019-03-07 RX ORDER — LIDOCAINE HYDROCHLORIDE 20 MG/ML
INJECTION, SOLUTION INFILTRATION; PERINEURAL AS NEEDED
Status: DISCONTINUED | OUTPATIENT
Start: 2019-03-07 | End: 2019-03-07 | Stop reason: HOSPADM

## 2019-03-07 RX ORDER — SODIUM CHLORIDE 0.9 % (FLUSH) 0.9 %
3-10 SYRINGE (ML) INJECTION AS NEEDED
Status: DISCONTINUED | OUTPATIENT
Start: 2019-03-07 | End: 2019-03-07 | Stop reason: HOSPADM

## 2019-03-07 RX ORDER — MIDAZOLAM HYDROCHLORIDE 1 MG/ML
INJECTION INTRAMUSCULAR; INTRAVENOUS AS NEEDED
Status: DISCONTINUED | OUTPATIENT
Start: 2019-03-07 | End: 2019-03-07 | Stop reason: HOSPADM

## 2019-03-07 RX ORDER — SODIUM CHLORIDE 9 MG/ML
75 INJECTION, SOLUTION INTRAVENOUS CONTINUOUS
Status: DISCONTINUED | OUTPATIENT
Start: 2019-03-07 | End: 2019-03-07 | Stop reason: HOSPADM

## 2019-03-07 RX ADMIN — SODIUM CHLORIDE 75 ML/HR: 9 INJECTION, SOLUTION INTRAVENOUS at 07:31

## 2019-03-07 NOTE — DISCHARGE INSTRUCTIONS
[facility]  [address] [place] [address]    Coronary Angiogram (Radial/Ulnar Approach) After Care    Refer to this sheet in the next few weeks. These instructions provide you with information on caring for yourself after your procedure. Your caregiver may also give you more specific instructions. Your treatment has been planned according to current medical practices, but problems sometimes occur. Call your caregiver if you have any problems or questions after your procedure.    Home Care Instructions:  · You may shower the day after the procedure. Remove the bandage (dressing) and gently wash the site with plain soap and water. Gently pat the site dry. You may apply a band aid daily for 2 days if desired.    · Do not apply powder or lotion to the site.  · Do not submerge the affected site in water for 3 to 5 days or until the site is completely healed.   · Do not lift, push or pull anything over 10 pounds for 2 days after your procedure.  · Inspect the site at least twice daily. You may notice some bruising at the site and it may be tender for 1 to 2 weeks.     · Increase your fluid intake for the next 2 days.    · Keep arm elevated for 24 hours. For the remainder of the day, keep your arm in “Pledge of Allegiance” position when up and about.     · You may drive 24 hours after the procedure unless otherwise instructed by your caregiver.  · Do not operate machinery or power tools for 24 hours.  · A responsible adult should be with you for the first 24 hours after you arrive home. Do not make any important legal decisions or sign legal papers for 24 hours.  Do not drink alcohol for 24 hours.    · Metformin or any medications containing Metformin should not be taken for 48 hours after your procedure.      Call Your Doctor if:   · You have unusual pain at the radial/ulnar (wrist) site.  · You have redness, warmth, swelling, or pain at the radial/ulnar (wrist) site.  · You have drainage (other  than a small amount of blood on the dressing).  · You have chills or a fever > 101.  · Your arm becomes pale or dark, cool, tingly, or numb.  · You have heavy bleeding from the site, hold pressure on the site for 20 minutes.  If the bleeding stops, apply a fresh bandage and call your cardiologist.  However, if you continue to have bleeding, call 911.

## 2019-03-07 NOTE — INTERVAL H&P NOTE
Symptomatic severe mitral insufficiency, preop left and right heart cath.  H&P reviewed. The patient was examined and there are no changes to the H&P. I have explained the risks and benefits of the procedure to the patient.  The patient understands and agrees to proceed

## 2019-03-08 ENCOUNTER — APPOINTMENT (OUTPATIENT)
Dept: INFUSION THERAPY | Facility: HOSPITAL | Age: 69
End: 2019-03-08

## 2019-03-08 LAB
HCT VFR BLDA CALC: 35 % (ref 38–51)
HGB BLDA-MCNC: 11.9 G/DL (ref 12–17)
SAO2 % BLDA: 94 % (ref 95–98)

## 2019-03-11 DIAGNOSIS — I34.0 NON-RHEUMATIC MITRAL REGURGITATION: Primary | ICD-10-CM

## 2019-03-13 RX ORDER — SODIUM CHLORIDE 9 MG/ML
250 INJECTION, SOLUTION INTRAVENOUS ONCE
Status: CANCELLED | OUTPATIENT
Start: 2019-03-13

## 2019-03-18 ENCOUNTER — HOSPITAL ENCOUNTER (OUTPATIENT)
Dept: INFUSION THERAPY | Facility: HOSPITAL | Age: 69
Discharge: HOME | End: 2019-03-18
Admitting: INTERNAL MEDICINE

## 2019-03-18 VITALS
TEMPERATURE: 97.8 F | DIASTOLIC BLOOD PRESSURE: 70 MMHG | BODY MASS INDEX: 21.97 KG/M2 | HEART RATE: 93 BPM | RESPIRATION RATE: 20 BRPM | OXYGEN SATURATION: 99 % | SYSTOLIC BLOOD PRESSURE: 120 MMHG | WEIGHT: 132 LBS

## 2019-03-18 DIAGNOSIS — M81.0 AGE RELATED OSTEOPOROSIS, UNSPECIFIED PATHOLOGICAL FRACTURE PRESENCE: Primary | ICD-10-CM

## 2019-03-18 PROCEDURE — 96374 THER/PROPH/DIAG INJ IV PUSH: CPT

## 2019-03-18 PROCEDURE — 25010000002 ZOLEDRONIC ACID 5 MG/100ML SOLUTION: Performed by: INTERNAL MEDICINE

## 2019-03-18 PROCEDURE — 96365 THER/PROPH/DIAG IV INF INIT: CPT

## 2019-03-18 RX ORDER — ZOLEDRONIC ACID 5 MG/100ML
5 INJECTION, SOLUTION INTRAVENOUS ONCE
Status: COMPLETED | OUTPATIENT
Start: 2019-03-18 | End: 2019-03-18

## 2019-03-18 RX ORDER — ZOLEDRONIC ACID 5 MG/100ML
5 INJECTION, SOLUTION INTRAVENOUS ONCE
Status: CANCELLED | OUTPATIENT
Start: 2019-03-18

## 2019-03-18 RX ADMIN — ZOLEDRONIC ACID 5 MG: 5 INJECTION, SOLUTION INTRAVENOUS at 10:52

## 2019-03-18 NOTE — PROGRESS NOTES
Creatinine Clearance 69.06 ccs/min per cockcroft gault calculator.  Pt had received medication before and had not experiences any adverse side effects.  Pt tolerated infusion well and dc'ed ambulatory at 1114.

## 2019-04-26 ENCOUNTER — APPOINTMENT (OUTPATIENT)
Dept: WOMENS IMAGING | Facility: HOSPITAL | Age: 69
End: 2019-04-26

## 2019-04-26 PROCEDURE — 76641 ULTRASOUND BREAST COMPLETE: CPT | Performed by: RADIOLOGY

## 2019-05-02 ENCOUNTER — TELEPHONE (OUTPATIENT)
Dept: SURGERY | Facility: CLINIC | Age: 69
End: 2019-05-02

## 2019-05-02 NOTE — TELEPHONE ENCOUNTER
[facility] April [facility], [facility] bilateral breast ultrasound:  1.  Follow-up oval mass 12:00.  Stable complicated cyst 8 mm posterior one third left breast 12:00, 8 cm from the nipple.  No significant change.  Follow-up oval mass right breast 830 stable complicated cyst measuring 5 mm posterior one third 830, 5 cm from the nipple no significant change.  BI-RADS 3 recommend ultrasound in 6 months.  Her screening mammogram is due in 6 months.

## 2019-05-10 ENCOUNTER — OFFICE VISIT (OUTPATIENT)
Dept: SURGERY | Facility: CLINIC | Age: 69
End: 2019-05-10

## 2019-05-10 VITALS
HEIGHT: 65 IN | BODY MASS INDEX: 22.66 KG/M2 | WEIGHT: 136 LBS | OXYGEN SATURATION: 99 % | TEMPERATURE: 97.6 F | DIASTOLIC BLOOD PRESSURE: 62 MMHG | HEART RATE: 95 BPM | SYSTOLIC BLOOD PRESSURE: 110 MMHG

## 2019-05-10 DIAGNOSIS — Z12.31 ENCOUNTER FOR SCREENING MAMMOGRAM FOR MALIGNANT NEOPLASM OF BREAST: ICD-10-CM

## 2019-05-10 DIAGNOSIS — R92.8 ABNORMAL ULTRASOUND OF BREAST: Primary | ICD-10-CM

## 2019-05-10 DIAGNOSIS — R92.8 ABNORMAL MAMMOGRAM: ICD-10-CM

## 2019-05-10 PROCEDURE — 99212 OFFICE O/P EST SF 10 MIN: CPT | Performed by: SURGERY

## 2019-05-10 NOTE — PROGRESS NOTES
Chief Complaint: Rasheeda Balderas is a 68 y.o. female who was seen in consultation at the request of Malini Simeon MD  for breast cysts and a followup visit    History of Present Illness:  Patient presents with abnormal breast imaging. She noted no new masses, skin changes, nipple discharge, nipple changes prior to her most recent imaging.  Her most recent imaging includes the followin2017  [facility]   Mammo  Rasheeda Balderas  BILATERAL MAMMOGRAPHY  Scattered areas of fibroglandular density.  BI-RADS CATEGORY: 2, Benign.    2018  [facility]   Mammo  Rasheeda Balderas  BILATERAL SCREENING MAMMOGRAM WITH TOMOSYNTHESIS  Scattered areas of fibroglandular density.  Finding 1: High density, oval mass measuring 5 millimeters posterior one-third left breast at 12 o’clock located 8 centimeters from the nipple.  Finding 2: Small, ova mass measuring 4 millimeters seen in the posterior one-third 8:30 o’clock region of the right breast located 5 centimeters from the nipple.  IMPRESSION:  BI-RADS CATEGORY: 0    10/09/2018  [facility]   Mammo  Rasheeda Balderas  BILATERAL DIAGNOSTIC MAMMOGRAM WITH TOMOSYNTHESIS  Finding 1: Oval mass in the left breast, 12 o’clock.  Finding 2: Oval mass in the right breast, 8:30 o’clock.  BILATERAL BREAST ULTRASOUND  Finding 1: Oval complicated cyst measuring 8 x 3 x 6 mm.  Finding 2: Oval complicated cyst measuring 5 x 3 x 5 mm.  IMPRESSION:  Finding 1: Probably benign. Follow-up ultrasound exam in 6 months.  Finding 2: Complicated cyst in the right breast is probably benign. Follow-up ultrasound exam in 6 months.  BI-RADS CATEGORY: 3, Probably Benign.    She has not had a breast biopsy in the past.  She has her uterus and ovaries, is postmenopausal, and takes nor hormones. She took HRT for 2 years, remotely.  Her family history includes the following: she has no children,.  One sister, no maternal aunts and 2 paternal aunts.  No history of breast or  ovarian cancer in her family.    Interval History:  In the interim,  Rasheeda Balderas  has done well.  She has noted no changes in her breast exam. No new masses, skin changes, nipple changes, nipple discharge either breast.     Her most recent imaging includes the following:    [facility] bilateral breast ultrasound:  1.  Follow-up oval mass 12:00.  Stable complicated cyst 8 mm posterior one third left breast 12:00, 8 cm from the nipple.  No significant change.  Follow-up oval mass right breast 830 stable complicated cyst measuring 5 mm posterior one third 830, 5 cm from the nipple no significant change.  BI-RADS 3 recommend ultrasound in 6 months.  Her screening mammogram is due in 6 months.    She is here to review.  Review of Systems:  Review of Systems   Constitutional: Positive for unexpected weight change (2 lb wt gain).   HENT:   Positive for sore throat.    Musculoskeletal: Positive for arthralgias.   All other systems reviewed and are negative.       Past Medical and Surgical History:  Breast Biopsy History:  Patient has not had a breast biopsy in the past.  Breast Cancer HIstory:  Patient does not have a past medical history of breast cancer.  Breast Operations, and year:  NONE   Obstetric/Gynecologic History:  Age menstrual periods began: 13  Patient is postmenopausal, entered menopause naturally at age: LATE 40S   Number of pregnancies: 0  Number of live births: 0  Number of abortions or miscarriages: 0  Age of delivery of first child: N/A  BREAST FEEDING: N/A   Length of time taking birth control pills: N/A   Patient took hormone replacement during the following dates: 2 YRS   PATIENT STILL HAS UTERUS AND OVARIES.     Past Surgical History:   Procedure Laterality Date   • CARDIAC CATHETERIZATION N/A 3/7/2019    Procedure: Right and Left Heart Cath;  Surgeon: Cam Hand MD;  Location: [facility] CATH INVASIVE LOCATION;  Service: Cardiology   • CARDIAC CATHETERIZATION N/A  "3/7/2019    Procedure: Coronary angiography;  Surgeon: Cam Hnad MD;  Location:  [facility] CATH INVASIVE LOCATION;  Service: Cardiology   • CARDIAC CATHETERIZATION N/A 3/7/2019    Procedure: Left ventriculography;  Surgeon: Cam Hand MD;  Location:  [facility] CATH INVASIVE LOCATION;  Service: Cardiology   • LAPAROSCOPIC CHOLECYSTECTOMY W/ CHOLANGIOGRAPHY     • SHOULDER SURGERY Left      Past Medical History:   Diagnosis Date   • Arthritis    • CAD (coronary artery disease)    • Cholelithiasis    • Heartburn    • Mitral valve prolapse syndrome    • Myocardial infarction (CMS/HCC)     pt is not aware of history of MI   • Osteoporosis        Prior Hospitalizations, other than for surgery or childbirth, and year:  NONE     Social History     Socioeconomic History   • Marital status:      Spouse name: Not on file   • Number of children: Not on file   • Years of education: Not on file   • Highest education level: Not on file   Occupational History   • Occupation: professor   Tobacco Use   • Smoking status: Former Smoker   • Smokeless tobacco: Never Used   • Tobacco comment: caffeine use   Substance and Sexual Activity   • Alcohol use: Yes     Comment: weekends-socially   • Drug use: No   • Sexual activity: Defer     Patient is .  Patient is retired.  Patient drinks 1-2 servings of caffeine per day.    Family History:  Family History   Problem Relation Age of Onset   • Brain cancer Mother    • Heart disease Father    • Brain cancer Father    • Stomach cancer Maternal Grandmother    • Cancer Maternal Grandfather    • No Known Problems Paternal Grandmother    • No Known Problems Paternal Grandfather        Vital Signs:  /62 (BP Location: Right arm, Patient Position: Sitting, Cuff Size: Adult)   Pulse 95   Temp 97.6 °F (36.4 °C) (Temporal)   Ht 165.1 cm (65\")   Wt 61.7 kg (136 lb)   LMP  (LMP Unknown)   SpO2 99%   Breastfeeding? No   BMI 22.63 kg/m²      Medications:    Current Outpatient " "Medications:   •  acetaminophen (TYLENOL) 500 MG tablet, Take 500 mg by mouth every 6 (six) hours as needed for mild pain (1-3)., Disp: , Rfl:   •  aspirin 81 MG chewable tablet, Chew 81 mg daily., Disp: , Rfl:   •  b complex-C-folic acid 1 MG capsule, Take 1 capsule by mouth Daily., Disp: , Rfl:   •  Cholecalciferol (VITAMIN D3) 5000 units capsule capsule, Take 5,000 Units by mouth Daily., Disp: , Rfl:   •  Cinnamon 500 MG capsule, Take 500 mg by mouth Daily., Disp: , Rfl:   •  simvastatin (ZOCOR) 20 MG tablet, Take 20 mg by mouth every night., Disp: , Rfl:   •  zoledronic acid (RECLAST) 5 MG/100ML solution injection, Infuse 5 mg into a venous catheter 1 (one) time. 1 time yearly, Disp: , Rfl:      Allergies:  Allergies   Allergen Reactions   • No Known Drug Allergy        Physical Examination:  /62 (BP Location: Right arm, Patient Position: Sitting, Cuff Size: Adult)   Pulse 95   Temp 97.6 °F (36.4 °C) (Temporal)   Ht 165.1 cm (65\")   Wt 61.7 kg (136 lb)   LMP  (LMP Unknown)   SpO2 99%   Breastfeeding? No   BMI 22.63 kg/m²   General Appearance:  Patient is in no distress.  She is well kept and has an thin build.   Psychiatric:  Patient with appropriate mood and affect. Alert and oriented to self, time, and place.    Breast, RIGHT:  small sized, symmetric with the contralateral side.  Breast skin is without erythema, edema, rashes.  There are no visible abnormalities upon inspection during the arm-raising maneuver or with hands on hips in the sitting position. There is no nipple retraction, discharge or nipple/areolar skin changes.There are no masses palpable in the sitting or supine positions.    Breast, LEFT:  small sized, symmetric with the contralateral side.  Breast skin is without erythema, edema, rashes.  There are no visible abnormalities upon inspection during the arm-raising maneuver or with hands on hips in the sitting position. There is no nipple retraction, discharge or nipple/areolar " skin changes.There are no masses palpable in the sitting or supine positions.    Lymphatic:  There is no axillary, cervical, infraclavicular, or supraclavicular adenopathy bilaterally.  Eyes:  Pupils are round and reactive to light.  Cardiovascular:  Heart rate and rhythm are regular.  Respiratory:  Lungs are clear bilaterally with no crackles or wheezes in any lung field.  Gastrointestinal:  Abdomen is soft, nondistended, and nontender.     Musculoskeletal:  Good strength in all 4 extremities.   There is good range of motion in both shoulders.    Skin:  No new skin lesions or rashes on the skin excluding the breast (see breast exam above).        Imagin  [facility]   Mammo  Rasheeda Sherrie  BILATERAL MAMMOGRAPHY  Scattered areas of fibroglandular density.  BI-RADS CATEGORY: 1, Negative.    2017  [facility]   Mammo  Rasheeda Sherrie  BILATERAL MAMMOGRAPHY  Scattered areas of fibroglandular density.  BI-RADS CATEGORY: 2, Benign.    2018  [facility]   Mammo  Rasheeda Sherrie  BILATERAL SCREENING MAMMOGRAM WITH TOMOSYNTHESIS  Scattered areas of fibroglandular density.  Finding 1: High density, oval mass measuring 5 millimeters posterior one-third left breast at 12 o’clock located 8 centimeters from the nipple.  Finding 2: Small, ova mass measuring 4 millimeters seen in the posterior one-third 8:30 o’clock region of the right breast located 5 centimeters from the nipple.  IMPRESSION:  BI-RADS CATEGORY: 0    10/09/2018  [facility]   Mammo  Rasheeda Sherrie  BILATERAL DIAGNOSTIC MAMMOGRAM WITH TOMOSYNTHESIS  Finding 1: Oval mass in the left breast, 12 o’clock.  Finding 2: Oval mass in the right breast, 8:30 o’clock.  BILATERAL BREAST ULTRASOUND  Finding 1: Oval complicated cyst measuring 8 x 3 x 6 mm.  Finding 2: Oval complicated cyst measuring 5 x 3 x 5 mm.  IMPRESSION:  Finding 1: Probably benign. Follow-up ultrasound exam in 6 months.  Finding 2: Complicated  cyst in the right breast is probably benign. Follow-up ultrasound exam in 6 months.  BI-RADS CATEGORY: 3, Probably Benign.    [facility] bilateral breast ultrasound:  1.  Follow-up oval mass 12:00.  Stable complicated cyst 8 mm posterior one third left breast 12:00, 8 cm from the nipple.  No significant change.  Follow-up oval mass right breast 830 stable complicated cyst measuring 5 mm posterior one third 830, 5 cm from the nipple no significant change.  BI-RADS 3 recommend ultrasound in 6 months.  Her screening mammogram is due in 6 months.        Procedures:      Assessment:   Diagnosis Plan   1. Abnormal ultrasound of breast  Mammo Screening Digital Tomosynthesis Bilateral With CAD    US Breast Bilateral Complete   2. Abnormal mammogram     3. Encounter for screening mammogram for malignant neoplasm of breast   Mammo Screening Digital Tomosynthesis Bilateral With CAD     1-2  Right breast 8:30, 2 cm from the nipple: 4 mm mass on mammogram, 5 mm mass on ultrasound, BI-RADS 3 likely complicated cyst. 4-2019 FU US BR3    Left breast 12:00, 2 cm from the nipple 5 mm mass on mammogram, 8 mm mass on ultrasound, BI-RADS 3 likely, located cyst.- 4-2019 FU US BR3      Plan:    We reviewed her interval history, imaging, imaging reports and examination together today.    Her imaging and exam are in good order.    We reviewed that a BIRADS 3 designation describes an imaging finding that is 97-98% likely to represent a benign process.     I will arrange for her 9-12-19 screening mammogram along with a bialteral US at [facility] and to see em back after.    She is set up for a MV repair in [place] May 30,2019.    Past tobacco use 1/2-1ppd from age 21 to age 61.    I asked her to continue her self breast exam and to call us in the interim with any concerns would be happy to see her back sooner.    Kathleen Polanco MD          Next Appointment:  Return for Next scheduled follow up, after  imaging.      EMR Dragon/transcription disclaimer:    Much of this encounter note is an electronic transcription/translocation of spoken language to printed text.  The electronic translation of spoken language may permit erroneous, or at times, nonsensical words or phrases to be inadvertently transcribed.  Although I have reviewed the note from such areas, some may still exist.

## 2019-05-31 ENCOUNTER — TELEPHONE (OUTPATIENT)
Dept: CARDIOLOGY | Facility: CLINIC | Age: 69
End: 2019-05-31

## 2019-05-31 NOTE — TELEPHONE ENCOUNTER
Charu Strange  from [facility] calling stating Rasheeda will be D/C on Monday and have a follow up appt. On Wed and then will fly home  She will have Angela. Home health follow her  [facility] will follow her Coumadin.[phone]

## 2019-06-02 LAB — INR PPP: 1.2

## 2019-06-06 ENCOUNTER — TELEPHONE (OUTPATIENT)
Dept: CARDIOLOGY | Facility: CLINIC | Age: 69
End: 2019-06-06

## 2019-06-06 DIAGNOSIS — Z98.890 S/P MVR (MITRAL VALVE REPAIR): Primary | ICD-10-CM

## 2019-06-06 NOTE — TELEPHONE ENCOUNTER
She needs to get seen soon with Teresita or Brynn beltran in the next week and then also needs to get started in cardiac rehab within the next 2 weeks and then I will see her after that probably I need to see her in the middle of July this would be considered a hospital follow-up

## 2019-06-06 NOTE — TELEPHONE ENCOUNTER
----- Message from Liliana Obregon sent at 6/6/2019  2:09 PM EDT -----  Regarding: Follow up  Spoke to Mrs. Balderas.  She was in the hospital in [place].  She wanted Dr Hand to know they got home safely.  She was also wondering when Dr Hand wanted to see her.  She is seeing her PCP on 6/27.  Dr Hand has a hospital follow up on 7/31.  Just let me know what I need to do.  Thank you,  Liliana

## 2019-06-06 NOTE — TELEPHONE ENCOUNTER
Liliana can you please set up her appt. With Teresita or Sarah next week  And with Dr. Hand in the middle of July hosp follow up appts.

## 2019-06-07 ENCOUNTER — ANTICOAGULATION VISIT (OUTPATIENT)
Dept: PHARMACY | Facility: HOSPITAL | Age: 69
End: 2019-06-07

## 2019-06-07 ENCOUNTER — TELEPHONE (OUTPATIENT)
Dept: CARDIOLOGY | Facility: CLINIC | Age: 69
End: 2019-06-07

## 2019-06-07 DIAGNOSIS — Z79.01 LONG TERM (CURRENT) USE OF ANTICOAGULANTS: ICD-10-CM

## 2019-06-07 LAB — INR PPP: 2.7

## 2019-06-07 NOTE — TELEPHONE ENCOUNTER
Patient instucted to discontinue spironolactone/hctz.  I did verify with the patient that she is not taking any lasix.  Also confirmed she is only taking the metoprolol 12.5mg BID, and she said she was.  Asked her to weigh daily and call us for wt gain of 3lbs or more over night or 5lbs or more over a week. Patient verbalized understanding.    Radha with [facility] notified.  She is making a visit tomorrow to check on the patient. I let her know that if any issues came up over the weekend to have him paged, he is on call.    Thanks  Jennifer Ireland RN  Triage nurse

## 2019-06-07 NOTE — TELEPHONE ENCOUNTER
Spoke to patient and scheduled @ appointment with Teresita on  6/10 and Dr Hand 7/19.  Let me know if you need anything else.  Thank you,  Liliana

## 2019-06-07 NOTE — TELEPHONE ENCOUNTER
06/07/19  1:56 PM  Rasheeda Balderas  1950  Home Phone [phone]   Mobile [phone]       Rasheeda Balderas is a patient of Dr Hand.  Radha with [facility] is calling in to report the that patient is dizzy and has a bp of 80/60.    Cardiac meds reviewed with Radha  Amiodarone 200mg daily  furosemide 40mg BID -on DC list from [facility] dc instructions, but Radha stated the patient is not taking.  Metoprolol tartrate 25mg 1/2 BID-patients  had been giving the patient a whole pill BID, but went to a half a tab BID yesterday after [facility] found his error.  Spironolactone 25mg  Daily- patient taking combination spironolactone 25/hctz 25mg daily  Warfarin as directed.    Radha has instructed the patient to push fluids today.  Does this patient need a med adjustment?  Patient has an apt to see Sarah 6/10/19  Thanks  Jennifer Ireland RN  Triage nurse

## 2019-06-07 NOTE — PROGRESS NOTES
Anticoagulation Clinic Progress Note    Anticoagulation Summary  As of 2019    INR goal:   2.0-3.0   TTR:   --   INR used for dosin.20! (2019)   Warfarin maintenance plan:   1.25 mg every day   Weekly warfarin total:   8.75 mg   Plan last modified:   Jennifer Nathan RPH (2019)   Next INR check:   6/10/2019   Target end date:   Indefinite    Indications    Long term (current) use of anticoagulants [Z79.01] [Z79.01]             Anticoagulation Episode Summary     INR check location:       Preferred lab:       Send INR reminders to:   [facility] [place]    Comments:         Anticoagulation Care Providers     Provider Role Specialty Phone number    Cam Hand MD Referring Cardiology [phone]          INR History:  Anticoagulation Monitoring 2019   INR 1.20   INR Date 2019   INR Goal 2.0-3.0   Last Week Total 11.25 mg   Next Week Total 8.75 mg   Sun 1.25 mg   Mon -   Tue -   Wed -   Thu -   Fri 1.25 mg   Sat 1.25 mg   Visit Report -       Plan:  1. INR is Therapeutic today- see above in Anticoagulation Summary.   Provided instructions to Radha with [facility] to Continue their warfarin regimen- see above in Anticoagulation Summary.  2. Follow up in 3 days      Jennifer Nathan RPH

## 2019-06-10 ENCOUNTER — ANTICOAGULATION VISIT (OUTPATIENT)
Dept: PHARMACY | Facility: HOSPITAL | Age: 69
End: 2019-06-10

## 2019-06-10 ENCOUNTER — OFFICE VISIT (OUTPATIENT)
Dept: CARDIOLOGY | Facility: CLINIC | Age: 69
End: 2019-06-10

## 2019-06-10 VITALS
BODY MASS INDEX: 22.16 KG/M2 | OXYGEN SATURATION: 98 % | HEART RATE: 83 BPM | WEIGHT: 133 LBS | DIASTOLIC BLOOD PRESSURE: 60 MMHG | SYSTOLIC BLOOD PRESSURE: 112 MMHG | HEIGHT: 65 IN

## 2019-06-10 DIAGNOSIS — I48.0 PAROXYSMAL ATRIAL FIBRILLATION (HCC): ICD-10-CM

## 2019-06-10 DIAGNOSIS — Z98.890 S/P MVR (MITRAL VALVE REPAIR): Primary | ICD-10-CM

## 2019-06-10 DIAGNOSIS — Z79.01 LONG TERM (CURRENT) USE OF ANTICOAGULANTS: ICD-10-CM

## 2019-06-10 LAB — INR PPP: 2.3

## 2019-06-10 PROCEDURE — 99213 OFFICE O/P EST LOW 20 MIN: CPT | Performed by: PHYSICIAN ASSISTANT

## 2019-06-10 PROCEDURE — 93000 ELECTROCARDIOGRAM COMPLETE: CPT | Performed by: PHYSICIAN ASSISTANT

## 2019-06-10 RX ORDER — AMIODARONE HYDROCHLORIDE 200 MG/1
200 TABLET ORAL DAILY
COMMUNITY
Start: 2019-06-02 | End: 2019-06-17 | Stop reason: SDUPTHER

## 2019-06-10 RX ORDER — WARFARIN SODIUM 2.5 MG/1
1.25 TABLET ORAL NIGHTLY
COMMUNITY
Start: 2019-06-02 | End: 2019-06-17 | Stop reason: SDUPTHER

## 2019-06-10 RX ORDER — IBUPROFEN 400 MG/1
400 TABLET ORAL EVERY 6 HOURS PRN
COMMUNITY
End: 2019-10-14 | Stop reason: ALTCHOICE

## 2019-06-10 RX ORDER — METAXALONE 800 MG/1
800 TABLET ORAL AS NEEDED
COMMUNITY

## 2019-06-10 NOTE — PROGRESS NOTES
Date of Office Visit: 06/10/2019  Encounter Provider: ANDREY Baugh  Place of Service: [facility] CARDIOLOGY  Patient Name: Rasheeda Balderas  :1950    Chief Complaint   Patient presents with   • Coronary Artery Disease     1 week hospital follow up   :     HPI: Rasheeda Balderas is a 68 y.o. female, new to me, who presents today for follow-up.  Old records have been obtained and reviewed by me.  She is a patient of Dr. Barone with a past cardiac history significant for mitral regurgitation.  On 2019, she underwent a complex mitral valve repair with a 38 mm Compa-Hooper partial annuloplasty ring and Drew stitch.  This was performed by Dr. Mayo in [place].  She did follow-up with her surgeon on 2019.  At that visit her Lasix, potassium, and Aldactone were discontinued.  She was started on Aldactazide 25/25 mg 1 tablet daily.  The intention was to stay on this for 6 months.  He also recommended continuing her low-dose Lopressor for 6 months, as well as her amiodarone and warfarin for 1 month after surgery.  He felt that if she remained out of atrial fibrillation for 1 month after surgery, we could discontinue the amiodarone and the warfarin.  She then called our office on 2019 with complaints of a blood pressure in the 80s systolic as well as feeling dizzy.  At that time I advised her to stop her diuretics.  She is here today for follow-up.   Since she stopped her Aldactazide she is been doing much better.  Her blood pressure readings at home have been in the low 100s.  Her heart rate has been in the 50s to 60s.  She has not had any bleeding issues on the warfarin.  She gets a little lightheaded in the morning but this resolves by the evening.  She has a little shortness of breath on exertion but nothing seems to be getting any worse.  Her daily weights have been stable and she is back to her preop weight.    Past Medical History:   Diagnosis Date   •  Arthritis    • CAD (coronary artery disease)    • Cholelithiasis    • Heartburn    • Mitral valve prolapse syndrome    • Myocardial infarction (CMS/HCC)     pt is not aware of history of MI   • Osteoporosis        Past Surgical History:   Procedure Laterality Date   • CARDIAC CATHETERIZATION N/A 3/7/2019    Procedure: Right and Left Heart Cath;  Surgeon: Cam Hand MD;  Location:  [facility];  Service: Cardiology   • CARDIAC CATHETERIZATION N/A 3/7/2019    Procedure: Coronary angiography;  Surgeon: Cam Hand MD;  Location:  [facility];  Service: Cardiology   • CARDIAC CATHETERIZATION N/A 3/7/2019    Procedure: Left ventriculography;  Surgeon: Cam Hand MD;  Location:  [facility];  Service: Cardiology   • LAPAROSCOPIC CHOLECYSTECTOMY W/ CHOLANGIOGRAPHY     • SHOULDER SURGERY Left        Social History     Socioeconomic History   • Marital status:      Spouse name: Not on file   • Number of children: Not on file   • Years of education: Not on file   • Highest education level: Not on file   Occupational History   • Occupation: professor   Tobacco Use   • Smoking status: Former Smoker   • Smokeless tobacco: Never Used   • Tobacco comment: caffeine use   Substance and Sexual Activity   • Alcohol use: Yes     Alcohol/week: 1.8 oz     Types: 1 Glasses of wine, 1 Cans of beer, 1 Shots of liquor per week     Comment: weekends-socially   • Drug use: No   • Sexual activity: Defer       Family History   Problem Relation Age of Onset   • Brain cancer Mother    • Heart disease Father    • Brain cancer Father    • Stomach cancer Maternal Grandmother    • Cancer Maternal Grandfather    • No Known Problems Paternal Grandmother    • No Known Problems Paternal Grandfather        Review of Systems   Constitution: Negative for chills, fever and malaise/fatigue.   Cardiovascular: Positive for dyspnea on exertion. Negative for chest pain, leg swelling,  near-syncope, orthopnea, palpitations, paroxysmal nocturnal dyspnea and syncope.   Respiratory: Negative for cough and shortness of breath.    Musculoskeletal: Negative for joint pain, joint swelling and myalgias.   Gastrointestinal: Negative for abdominal pain, diarrhea, melena, nausea and vomiting.   Genitourinary: Negative for frequency and hematuria.   Neurological: Positive for light-headedness. Negative for numbness, paresthesias and seizures.   Allergic/Immunologic: Negative.    All other systems reviewed and are negative.      Allergies   Allergen Reactions   • No Known Drug Allergy          Current Outpatient Medications:   •  acetaminophen (TYLENOL) 500 MG tablet, Take 500 mg by mouth every 6 (six) hours as needed for mild pain (1-3)., Disp: , Rfl:   •  amiodarone (PACERONE) 200 MG tablet, Take 200 mg by mouth Daily., Disp: , Rfl:   •  aspirin 81 MG chewable tablet, Chew 81 mg daily., Disp: , Rfl:   •  b complex-C-folic acid 1 MG capsule, Take 1 capsule by mouth Daily., Disp: , Rfl:   •  Cholecalciferol (VITAMIN D3) 5000 units capsule capsule, Take 5,000 Units by mouth Daily., Disp: , Rfl:   •  ibuprofen (ADVIL,MOTRIN) 400 MG tablet, Take 400 mg by mouth Every 6 (Six) Hours As Needed for Mild Pain ., Disp: , Rfl:   •  metaxalone (SKELAXIN) 800 MG tablet, Take 800 mg by mouth As Needed for Muscle Spasms., Disp: , Rfl:   •  metoprolol tartrate (LOPRESSOR) 25 MG tablet, Take 12.5 mg by mouth 2 (Two) Times a Day., Disp: , Rfl:   •  simvastatin (ZOCOR) 20 MG tablet, Take 20 mg by mouth every night., Disp: , Rfl:   •  warfarin (COUMADIN) 2.5 MG tablet, Take 1.25 mg by mouth Every Night., Disp: , Rfl:   •  zoledronic acid (RECLAST) 5 MG/100ML solution injection, Infuse 5 mg into a venous catheter 1 (one) time. 1 time yearly, Disp: , Rfl:       Objective:     Vitals:    06/10/19 1506 06/10/19 1508   BP: 108/58 112/60   BP Location: Right arm Left arm   Pulse: 83    SpO2: 98%    Weight: 60.3 kg (133 lb)    Height:  "165.1 cm (65\")      Body mass index is 22.13 kg/m².    PHYSICAL EXAM:    Physical Exam   Constitutional: She is oriented to person, place, and time. She appears well-developed and well-nourished. No distress.   HENT:   Head: Normocephalic and atraumatic.   Eyes: Pupils are equal, round, and reactive to light.   Neck: No JVD present. No thyromegaly present.   Cardiovascular: Normal rate, regular rhythm, normal heart sounds and intact distal pulses.   No murmur heard.  Pulmonary/Chest: Effort normal and breath sounds normal. No respiratory distress.   Abdominal: Soft. Bowel sounds are normal. She exhibits no distension. There is no splenomegaly or hepatomegaly. There is no tenderness.   Musculoskeletal: Normal range of motion. She exhibits no edema.   Neurological: She is alert and oriented to person, place, and time.   Skin: Skin is warm and dry. She is not diaphoretic. No erythema.   Sternal incision well-healed without erythema or edema.   Psychiatric: She has a normal mood and affect. Her behavior is normal. Judgment normal.         ECG 12 Lead  Date/Time: 6/10/2019 3:19 PM  Performed by: Sarah Holley PA  Authorized by: Sarah Holley PA   Comparison: compared with previous ECG from 2/8/2019  Similar to previous ECG  Rhythm: sinus rhythm  Ectopy: unifocal PVCs  BPM: 78  Conduction: 1st degree AV block    Clinical impression: abnormal EKG  Comments: Indication: Status post mitral valve repair.              Assessment:       Diagnosis Plan   1. S/P MVR (mitral valve repair)  ECG 12 Lead   2. Paroxysmal atrial fibrillation (CMS/HCC)  ECG 12 Lead     Orders Placed This Encounter   Procedures   • ECG 12 Lead     This order was created via procedure documentation          Plan:       Overall she is doing really well.  She denies any complaints of angina or heart failure.  She is on amiodarone and warfarin because she had some atrial fibrillation.  She is in sinus rhythm today.  I am not going to make any changes " to her medical regimen.  She will follow-up with Dr. Hand on 7/19/2019 or sooner if needed.    Atrial Fibrillation and Atrial Flutter  Assessment  • The patient has paroxysmal atrial fibrillation  • This is valvular in etiology  • The patient's CHADS2-VASc score is 2  • A [id number] score of 2 or more is considered a high risk for a thromboembolic event  • Warfarin prescribed    Plan  • Attempt to maintain sinus rhythm  • Continue warfarin for antithrombotic therapy, bleeding issues discussed  • Continue amiodarone and beta blocker for rhythm control  • Continue beta blocker for rate control      As always, it has been a pleasure to participate in your patient's care.      Sincerely,         Sarah Holley PA-C

## 2019-06-10 NOTE — PROGRESS NOTES
Anticoagulation Clinic Progress Note    Anticoagulation Summary  As of 6/10/2019    INR goal:   2.0-3.0   TTR:   --   INR used for dosin.30 (6/10/2019)   Warfarin maintenance plan:   2.5 mg every Mon; 1.25 mg all other days   Weekly warfarin total:   10 mg   Plan last modified:   Jennifer Nathan RPH (6/10/2019)   Next INR check:   2019   Target end date:   Indefinite    Indications    Long term (current) use of anticoagulants [Z79.01] [Z79.01]             Anticoagulation Episode Summary     INR check location:       Preferred lab:       [facility] [place]    Comments:         Anticoagulation Care Providers     Provider Role Specialty Phone number    Cam Hand MD Referring Cardiology [phone]          INR History:  Anticoagulation Monitoring 2019 6/10/2019   INR 1.20 2.30   INR Date 2019 6/10/2019   INR Goal 2.0-3.0 2.0-3.0   Trend - Up   Last Week Total 11.25 mg 10 mg   Next Week Total 8.75 mg 10 mg   Sun 1.25 mg -   Mon - 2.5 mg (6/10)   Tue - 1.25 mg   Wed - 1.25 mg   Thu - 1.25 mg   Fri 1.25 mg -   Sat 1.25 mg -   Visit Report - -       Plan:  1. INR is Therapeutic today- see above in Anticoagulation Summary.   Faxed instructions to  [facility] to Change their warfarin regimen- see above in Anticoagulation Summary.  2. Follow up in 4 days      Jennifer Nathan RPH

## 2019-06-14 ENCOUNTER — ANTICOAGULATION VISIT (OUTPATIENT)
Dept: PHARMACY | Facility: HOSPITAL | Age: 69
End: 2019-06-14

## 2019-06-14 DIAGNOSIS — Z79.01 LONG TERM (CURRENT) USE OF ANTICOAGULANTS: ICD-10-CM

## 2019-06-14 LAB — INR PPP: 1.7

## 2019-06-14 NOTE — PROGRESS NOTES
Anticoagulation Clinic Progress Note    Anticoagulation Summary  As of 2019    INR goal:   2.0-3.0   TTR:   --   INR used for dosin.70! (2019)   Warfarin maintenance plan:   2.5 mg every Mon, Fri; 1.25 mg all other days   Weekly warfarin total:   11.25 mg   Plan last modified:   Jennifer Nathan RPH (2019)   Next INR check:   2019   Target end date:   Indefinite    Indications    Long term (current) use of anticoagulants [Z79.01] [Z79.01]             Anticoagulation Episode Summary     INR check location:       Preferred lab:       [facility] [place]    Comments:         Anticoagulation Care Providers     Provider Role Specialty Phone number    Cam Hand MD Referring Cardiology [phone]          INR History:  Anticoagulation Monitoring 2019 6/10/2019 2019   INR 1.20 2.30 1.70   INR Date 2019 6/10/2019 2019   INR Goal 2.0-3.0 2.0-3.0 2.0-3.0   Trend - Up Up   Last Week Total 11.25 mg 10 mg 10 mg   Next Week Total 8.75 mg 10 mg 11.25 mg   Sun 1.25 mg - 1.25 mg   Mon - 2.5 mg (6/10) 2.5 mg   Tue - 1.25 mg 1.25 mg   Wed - 1.25 mg -   Thu - 1.25 mg -   Fri 1.25 mg - 2.5 mg   Sat 1.25 mg - 1.25 mg   Visit Report - - -   Some recent data might be hidden       Plan:  1. INR is Subtherapeutic today- see above in Anticoagulation Summary.   Provided instructions to Laurie with [facility] to Change their warfarin regimen- see above in Anticoagulation Summary.  2. Follow up in 5 days  3. Spoke to Laurie and faxed order to [facility].      Jennifer Nathan RPH

## 2019-06-17 RX ORDER — AMIODARONE HYDROCHLORIDE 200 MG/1
200 TABLET ORAL DAILY
Qty: 90 TABLET | Refills: 2 | Status: SHIPPED | OUTPATIENT
Start: 2019-06-17 | End: 2019-11-06

## 2019-06-17 RX ORDER — WARFARIN SODIUM 2.5 MG/1
TABLET ORAL
Qty: 90 TABLET | Refills: 0 | Status: SHIPPED | OUTPATIENT
Start: 2019-06-17 | End: 2019-07-12 | Stop reason: ALTCHOICE

## 2019-06-19 ENCOUNTER — ANTICOAGULATION VISIT (OUTPATIENT)
Dept: PHARMACY | Facility: HOSPITAL | Age: 69
End: 2019-06-19

## 2019-06-19 DIAGNOSIS — Z79.01 LONG TERM (CURRENT) USE OF ANTICOAGULANTS: ICD-10-CM

## 2019-06-19 LAB — INR PPP: 1.3

## 2019-06-19 NOTE — PROGRESS NOTES
Anticoagulation Clinic Progress Note    Anticoagulation Summary  As of 2019    INR goal:   2.0-3.0   TTR:   0.0 % (2 d)   INR used for dosin.30! (2019)   Warfarin maintenance plan:   2.5 mg every Mon, Fri; 1.25 mg all other days   Weekly warfarin total:   11.25 mg   Plan last modified:   Jennifer Nathan RPH (2019)   Next INR check:   2019   Target end date:   Indefinite    Indications    Long term (current) use of anticoagulants [Z79.01] [Z79.01]             Anticoagulation Episode Summary     INR check location:       Preferred lab:       Send INR reminders to:   [facility] CLINICAL [place]    Comments:         Anticoagulation Care Providers     Provider Role Specialty Phone number    Cam Hand MD Referring Cardiology [phone]          INR History:  Anticoagulation Monitoring 6/10/2019 2019 2019   INR 2.30 1.70 1.30   INR Date 6/10/2019 2019 2019   INR Goal 2.0-3.0 2.0-3.0 2.0-3.0   Trend Up Up Same   Last Week Total 10 mg 10 mg 11.25 mg   Next Week Total 10 mg 11.25 mg 15 mg   Sun - 1.25 mg -   Mon 2.5 mg (6/10) 2.5 mg -   Tue 1.25 mg 1.25 mg -   Wed 1.25 mg - 2.5 mg ()   Thu 1.25 mg - 2.5 mg ()   Fri - 2.5 mg -   Sat - 1.25 mg -   Visit Report - - -   Some recent data might be hidden       Plan:  1. INR is Subtherapeutic today- see above in Anticoagulation Summary.   Provided instructions to Tr with [facility] to Change their warfarin regimen- see above in Anticoagulation Summary.  2. Follow up in 2 days      Chapin Gallegos RPH

## 2019-06-21 ENCOUNTER — ANTICOAGULATION VISIT (OUTPATIENT)
Dept: PHARMACY | Facility: HOSPITAL | Age: 69
End: 2019-06-21

## 2019-06-21 DIAGNOSIS — Z79.01 LONG TERM (CURRENT) USE OF ANTICOAGULANTS: ICD-10-CM

## 2019-06-21 LAB — INR PPP: 1.5

## 2019-06-21 NOTE — PROGRESS NOTES
Anticoagulation Clinic Progress Note    Anticoagulation Summary  As of 2019    INR goal:   2.0-3.0   TTR:   0.0 % (4 d)   INR used for dosin.50! (2019)   Warfarin maintenance plan:   2.5 mg every Mon, Fri; 1.25 mg all other days   Weekly warfarin total:   11.25 mg   Plan last modified:   Jennifer Nathan RPH (2019)   Next INR check:   2019   Target end date:   Indefinite    Indications    Long term (current) use of anticoagulants [Z79.01] [Z79.01]             Anticoagulation Episode Summary     INR check location:       Preferred lab:       Send INR reminders to:   [facility] CLINICAL [place]    Comments:         Anticoagulation Care Providers     Provider Role Specialty Phone number    Cam Hand MD Referring Cardiology [phone]          INR History:  Anticoagulation Monitoring 2019   INR 1.70 1.30 1.50   INR Date 2019   INR Goal 2.0-3.0 2.0-3.0 2.0-3.0   Trend Up Same Same   Last Week Total 10 mg 11.25 mg 13.75 mg   Next Week Total 11.25 mg 15 mg 12.5 mg   Sun 1.25 mg - 1.25 mg   Mon 2.5 mg - -   Tue 1.25 mg - -   Wed - 2.5 mg () -   Thu - 2.5 mg () -   Fri 2.5 mg - 2.5 mg   Sat 1.25 mg - 2.5 mg ()   Visit Report - - -   Some recent data might be hidden       Plan:  1. INR is Subtherapeutic today- see above in Anticoagulation Summary.   Provided instructions to Tapan with [facility] to Change their warfarin regimen- see above in Anticoagulation Summary.  2. Follow up in 3 days      Chapin Gallegos RPH

## 2019-06-24 ENCOUNTER — ANTICOAGULATION VISIT (OUTPATIENT)
Dept: PHARMACY | Facility: HOSPITAL | Age: 69
End: 2019-06-24

## 2019-06-24 DIAGNOSIS — Z79.01 LONG TERM (CURRENT) USE OF ANTICOAGULANTS: ICD-10-CM

## 2019-06-24 LAB — INR PPP: 1.6

## 2019-06-24 NOTE — PROGRESS NOTES
Anticoagulation Clinic Progress Note    Anticoagulation Summary  As of 2019    INR goal:   2.0-3.0   TTR:   0.0 % (1 wk)   INR used for dosin.60! (2019)   Warfarin maintenance plan:   2.5 mg every day   Weekly warfarin total:   17.5 mg   Plan last modified:   Chapin Gallegos RPH (2019)   Next INR check:   2019   Target end date:   Indefinite    Indications    Long term (current) use of anticoagulants [Z79.01] [Z79.01]             Anticoagulation Episode Summary     INR check location:       Preferred lab:       [facility] [place]    Comments:         Anticoagulation Care Providers     Provider Role Specialty Phone number    Cam Hand MD Referring Cardiology [phone]          INR History:  Anticoagulation Monitoring 2019   INR 1.30 1.50 1.60   INR Date 2019   INR Goal 2.0-3.0 2.0-3.0 2.0-3.0   Trend Same Same Up   Last Week Total 11.25 mg 13.75 mg 15 mg   Next Week Total 15 mg 12.5 mg 17.5 mg   Sun - 1.25 mg -   Mon - - 2.5 mg   Tue - - 2.5 mg   Wed 2.5 mg () - 2.5 mg   Thu 2.5 mg () - -   Fri - 2.5 mg -   Sat - 2.5 mg () -   Visit Report - - -   Some recent data might be hidden       Plan:  1. INR is Subtherapeutic today- see above in Anticoagulation Summary.   Provided instructions to Tapan with [facility] to Increase their warfarin regimen- see above in Anticoagulation Summary.  2. Follow up in 3 days      Chapin Gallegos RPH

## 2019-06-27 ENCOUNTER — ANTICOAGULATION VISIT (OUTPATIENT)
Dept: PHARMACY | Facility: HOSPITAL | Age: 69
End: 2019-06-27

## 2019-06-27 ENCOUNTER — HOSPITAL ENCOUNTER (OUTPATIENT)
Dept: GENERAL RADIOLOGY | Facility: HOSPITAL | Age: 69
Discharge: HOME | End: 2019-06-27
Admitting: INTERNAL MEDICINE

## 2019-06-27 DIAGNOSIS — Z79.01 LONG TERM (CURRENT) USE OF ANTICOAGULANTS: ICD-10-CM

## 2019-06-27 DIAGNOSIS — R05.9 COUGH: ICD-10-CM

## 2019-06-27 LAB — INR PPP: 2

## 2019-06-27 PROCEDURE — 71046 X-RAY EXAM CHEST 2 VIEWS: CPT

## 2019-06-27 NOTE — PROGRESS NOTES
Anticoagulation Clinic Progress Note    Anticoagulation Summary  As of 2019    INR goal:   2.0-3.0   TTR:   0.0 % (1.4 wk)   INR used for dosin.00 (2019)   Warfarin maintenance plan:   2.5 mg every day   Weekly warfarin total:   17.5 mg   Plan last modified:   Chapin Gallegos [facility] (2019)   Next INR check:   2019   Target end date:   Indefinite    Indications    Long term (current) use of anticoagulants [Z79.01] [Z79.01]             Anticoagulation Episode Summary     INR check location:       Preferred lab:       Send INR reminders to:   [facility] CLINICAL [place]    Comments:         Anticoagulation Care Providers     Provider Role Specialty Phone number    Cam Hand MD Referring Cardiology [phone]          INR History:  Anticoagulation Monitoring 2019   INR 1.50 1.60 2.00   INR Date 2019   INR Goal 2.0-3.0 2.0-3.0 2.0-3.0   Trend Same Up Same   Last Week Total 13.75 mg 15 mg 16.25 mg   Next Week Total 12.5 mg 17.5 mg 17.5 mg   Sun 1.25 mg - 2.5 mg   Mon - 2.5 mg 2.5 mg   Tue - 2.5 mg -   Wed - 2.5 mg -   Thu - - 2.5 mg   Fri 2.5 mg - 2.5 mg   Sat 2.5 mg () - 2.5 mg   Visit Report - - -   Some recent data might be hidden       Plan:  1. INR is Therapeutic today- see above in Anticoagulation Summary.   Provided instructions to [facility] to Continue their warfarin regimen- see above in Anticoagulation Summary.  2. Follow up in 5 days  3. Left VM for patient to call to set up clinic appt to continue warfarin management since [facility] states pt discharged today.      Jennifer Nathan [facility]

## 2019-07-02 ENCOUNTER — OFFICE VISIT (OUTPATIENT)
Dept: CARDIAC REHAB | Facility: HOSPITAL | Age: 69
End: 2019-07-02

## 2019-07-02 ENCOUNTER — ANTICOAGULATION VISIT (OUTPATIENT)
Dept: PHARMACY | Facility: HOSPITAL | Age: 69
End: 2019-07-02

## 2019-07-02 VITALS — WEIGHT: 130.7 LBS | HEIGHT: 65 IN | BODY MASS INDEX: 21.77 KG/M2

## 2019-07-02 DIAGNOSIS — Z79.01 LONG TERM (CURRENT) USE OF ANTICOAGULANTS: ICD-10-CM

## 2019-07-02 DIAGNOSIS — Z98.890 S/P MVR (MITRAL VALVE REPAIR): Primary | ICD-10-CM

## 2019-07-02 LAB
INR PPP: 2.9 (ref 0.91–1.09)
PROTHROMBIN TIME: 34.8 SECONDS (ref 10–13.8)

## 2019-07-02 PROCEDURE — 85610 PROTHROMBIN TIME: CPT

## 2019-07-02 PROCEDURE — 36416 COLLJ CAPILLARY BLOOD SPEC: CPT

## 2019-07-02 PROCEDURE — 93797 PHYS/QHP OP CAR RHAB WO ECG: CPT

## 2019-07-02 PROCEDURE — G0463 HOSPITAL OUTPT CLINIC VISIT: HCPCS

## 2019-07-02 NOTE — PROGRESS NOTES
Cardiac Rehab Initial Assessment      Name: Rasheeda Balderas  :1950 Allergies:No known drug allergy   MRN: [id number] 68 y.o. Physician: Malini Simeon MD   Primary Diagnosis:    Diagnosis Plan   1. S/P MVR (mitral valve repair)      Event Date: 19 Specialist: Yazan   Secondary Diagnosis: Risk Stratification:Moderate Risk Note Author: Jennifer Cancino RN     Cardiovascular History: Comments Known Mitral valve stenosis followed for about 5 years. Increasing fatique and enlarged heart.       EXERCISE AT HOME  yes  Walking currently up to 10,000 steps per day average for 3 walks one walk 40 minutes other 2 about 20 minutes  N/A    EF: 51-55%%      Source: Echo 19          Ambulatory Status:Independent  Ambulatory Fall Risk Assessed on Initial Visit: yes 6 Minute Walk Pre- Cardiac Rehab:  Distance:1135ft      RPE:3  Max. HR: 77       SPO2:89    MET: 2.7  MPH: 2.1             RPD: 1  Resting BP: 92/64 LA, 98/64 RA    Peak BP: 90/54  Recovery BP: 86/54  Comments: States her legs got very tired at 3-4 minutes.  Her O2 level dropped to 89% encouraged her to take deep breaths and slow her pace.  Finished 6 minutes without stopping.       NUTRITION  Lipids:yes If yes, labs as follows;  Total: No components found for: CHOLESTEROL  HDL: No results found for: HDL Lipids continued:  LDL:No results found for: LDL  Triglyceride: No components found for: TRIGLYCERIDE   Weight Management:                 Weight: 130.7  Height: 65                   BMI: Body mass index is 21.75 kg/m².  Waist Circumference: 33.5  inches   Alcohol Use: defer Diabetes:No    Last HGBA1C with date if applicable:No components found for: A1C         SOCIAL HISTORY  Social History     Socioeconomic History   • Marital status:      Spouse name: Not on file   • Number of children: Not on file   • Years of education: Not on file   • Highest education level: Not on file   Occupational History   • Occupation: professor   Tobacco Use   •  Smoking status: Former Smoker   • Smokeless tobacco: Never Used   • Tobacco comment: caffeine use   Substance and Sexual Activity   • Alcohol use: Yes     Alcohol/week: 1.8 oz     Types: 1 Glasses of wine, 1 Cans of beer, 1 Shots of liquor per week     Comment: weekends-socially   • Drug use: No   • Sexual activity: Defer       Educational Level (choose one that applies) 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   Tobacco Adjunct: No  Former smoker < 1ppd quit 2009 had smoked about 30 years      Comorbidities: none     PSYCHOSOCIAL  Clinical Depression: no    Stress: yes     Assess presence or absence of depression using a valid screening tool: yes      PHYSICAL ASSESSMENT  Influenza vaccine: yes  Pneumococcal vaccine: no          Angina: no    Describe angina scale of 0 - 4: 0 = none    Today are you having incisional pain? No. If, Yes, Scale: having discomfort at site but does not describe it as pain.      Today are you having any other pain? Yes. If, Yes, Scale: minimal pain in left shoulder and right knee Diagnosed with Hypertension:no    Heart Sounds: regular rhythm without murmur    Lung Sounds: normal air entry, lungs clear to auscultation         Assessment: no ankle edema  Midline chest incision looks well-healed no drainage or redness.  Orthopedic Problems: left partial shoulder repair  discomfort especially with certain positions  Occasionally some right knee discomfort    Are you being hurt, hit, or  frightened by anyone at home or in your life? no    Are you being neglected by a caregiver? N/A Shoulder flexibility/Range of motion: Below average     Recommended arm activity: Any    Chair sit and reach within: 0 inches    Leg flexibility: Above average    Leg Strength/Balance/Five times sit to stand: 8 seconds.     Chose one: Average    Recommended stretching: Standing    Assessment: good flexibility of legs and good balance demonstrated    Family attends IA: yes Time of arrival: 0930  Time of departure: 1030     Patient Goals: To go back to exercise routine of walking one hour in the AM and walking after dinner and doing classical stretches for 20 minutes 5-6 times per week.  Wants to build strength, endurance and flexibility.           7/2/2019  11:26 AM  Jennifer Cancino RN

## 2019-07-02 NOTE — PROGRESS NOTES
Anticoagulation Clinic Progress Note    Anticoagulation Summary  As of 2019    INR goal:   2.0-3.0   TTR:   35.3 % (2.1 wk)   INR used for dosin.9 (2019)   Warfarin maintenance plan:   1.25 mg every Tue; 2.5 mg all other days   Weekly warfarin total:   16.25 mg   Plan last modified:   Jennifer Nathan RPH (2019)   Next INR check:   7/10/2019   Target end date:   Indefinite    Indications    Long term (current) use of anticoagulants [Z79.01] [Z79.01]             Anticoagulation Episode Summary     INR check location:       Preferred lab:       [facility] [place]    Comments:         Anticoagulation Care Providers     Provider Role Specialty Phone number    Cam Hand MD Referring Cardiology [phone]          Clinic Interview:      INR History:  Anticoagulation Monitoring 2019   INR 1.60 2.00 2.9   INR Date 2019   INR Goal 2.0-3.0 2.0-3.0 2.0-3.0   Trend Up Same Down   Last Week Total 15 mg 16.25 mg 17.5 mg   Next Week Total 17.5 mg 17.5 mg 16.25 mg   Sun - 2.5 mg 2.5 mg   Mon 2.5 mg 2.5 mg 2.5 mg   Tue 2.5 mg - 1.25 mg   Wed 2.5 mg - 2.5 mg   Thu - 2.5 mg 2.5 mg   Fri - 2.5 mg 2.5 mg   Sat - 2.5 mg 2.5 mg   Visit Report - - -   Some recent data might be hidden       Plan:  1. INR is Therapeutic today- see above in Anticoagulation Summary.  Will instruct Rasheeda Balderas to Change their warfarin regimen- see above in Anticoagulation Summary.  2. Follow up in 1 week  3. Patient declines warfarin refills.  4. Verbal and written information provided. Patient expresses understanding and has no further questions at this time.    Jennifer Nathan RPH

## 2019-07-02 NOTE — PROGRESS NOTES
Anticoagulation Clinic Progress Note  Anticoagulation Summary  As of 2019    INR goal:   2.0-3.0   TTR:   35.3 % (2.1 wk)   INR used for dosin.9 (2019)   Warfarin maintenance plan:   1.25 mg every Tue; 2.5 mg all other days   Weekly warfarin total:   16.25 mg   Plan last modified:   Jennifer Nathan RPH (2019)   Next INR check:   7/10/2019   Target end date:   Indefinite    Indications    Long term (current) use of anticoagulants [Z79.01] [Z79.01]             Anticoagulation Episode Summary     INR check location:       Preferred lab:       Send INR reminders to:    [facility] [place]    Comments:         Anticoagulation Care Providers     Provider Role Specialty Phone number    Cam Hand MD Referring Cardiology [phone]          Clinic Interview:      Education:  Rasheeda Balderas is a new start in the [facility]. We discussed wafarin and she has been completely educated previously at [facility].   Explained that she would be coming into the clinic more frequently in these first few weeks of therapy as we try to adjust her dose and achieve a therapeutic INR x 2 consecutive readings. Once that is achieved, patient will follow up in clinic every 4 weeks, on average.    She stated no problems with transportation or scheduling clinic appts in this manner. she expressed understanding of the information provided and has no additional questions at this time.    Rasheeda Balderas was presented with a copy of the Patients Rights and Responsibilities. she expressed verbal consent and agreement to receive care in the [facility] under the current collaborative care agreement with [facility].   See INR history in chart per home health.      Plan:  1. INR is Therapeutic today- see above in Anticoagulation Summary.   Will instruct Rasheeda Balderas to Change their warfarin regimen- see above in Anticoagulation Summary.  2. Follow up in 1 week  3. Patient  declines warfarin refills.  4. Verbal and written information provided. Patient expresses understanding and has no further questions at this time.    Jennifer Nathan [facility]

## 2019-07-03 ENCOUNTER — TREATMENT (OUTPATIENT)
Dept: CARDIAC REHAB | Facility: HOSPITAL | Age: 69
End: 2019-07-03

## 2019-07-03 DIAGNOSIS — Z98.890 S/P MVR (MITRAL VALVE REPAIR): Primary | ICD-10-CM

## 2019-07-03 PROCEDURE — 93798 PHYS/QHP OP CAR RHAB W/ECG: CPT

## 2019-07-05 ENCOUNTER — TREATMENT (OUTPATIENT)
Dept: CARDIAC REHAB | Facility: HOSPITAL | Age: 69
End: 2019-07-05

## 2019-07-05 DIAGNOSIS — Z98.890 S/P MVR (MITRAL VALVE REPAIR): Primary | ICD-10-CM

## 2019-07-05 PROCEDURE — 93798 PHYS/QHP OP CAR RHAB W/ECG: CPT

## 2019-07-08 ENCOUNTER — TREATMENT (OUTPATIENT)
Dept: CARDIAC REHAB | Facility: HOSPITAL | Age: 69
End: 2019-07-08

## 2019-07-08 DIAGNOSIS — Z98.890 S/P MVR (MITRAL VALVE REPAIR): Primary | ICD-10-CM

## 2019-07-08 PROCEDURE — 93798 PHYS/QHP OP CAR RHAB W/ECG: CPT

## 2019-07-10 ENCOUNTER — ANTICOAGULATION VISIT (OUTPATIENT)
Dept: PHARMACY | Facility: HOSPITAL | Age: 69
End: 2019-07-10

## 2019-07-10 ENCOUNTER — TREATMENT (OUTPATIENT)
Dept: CARDIAC REHAB | Facility: HOSPITAL | Age: 69
End: 2019-07-10

## 2019-07-10 DIAGNOSIS — Z79.01 LONG TERM (CURRENT) USE OF ANTICOAGULANTS: ICD-10-CM

## 2019-07-10 DIAGNOSIS — Z98.890 S/P MVR (MITRAL VALVE REPAIR): Primary | ICD-10-CM

## 2019-07-10 LAB
INR PPP: 2 (ref 0.91–1.09)
PROTHROMBIN TIME: 24.5 SECONDS (ref 10–13.8)

## 2019-07-10 PROCEDURE — 36416 COLLJ CAPILLARY BLOOD SPEC: CPT

## 2019-07-10 PROCEDURE — 93798 PHYS/QHP OP CAR RHAB W/ECG: CPT

## 2019-07-10 PROCEDURE — 85610 PROTHROMBIN TIME: CPT

## 2019-07-10 NOTE — PROGRESS NOTES
Anticoagulation Clinic Progress Note    Anticoagulation Summary  As of 7/10/2019    INR goal:   2.0-3.0   TTR:   57.6 % (3.3 wk)   INR used for dosin.0 (7/10/2019)   Warfarin maintenance plan:   1.25 mg every Tue; 2.5 mg all other days   Weekly warfarin total:   16.25 mg   No change documented:   Teresita Walton, Pharmacy Intern   Plan last modified:   Jennifer Nathan RPH (2019)   Next INR check:   2019   Target end date:   Indefinite    Indications    Long term (current) use of anticoagulants [Z79.01] [Z79.01]             Anticoagulation Episode Summary     INR check location:       Preferred lab:       Send INR reminders to:   [facility]    Comments:         Anticoagulation Care Providers     Provider Role Specialty Phone number    Cam Hand MD Referring Cardiology [phone]          Clinic Interview:  Patient Findings     Negatives:   Signs/symptoms of thrombosis, Signs/symptoms of bleeding,   Laboratory test error suspected, Change in health, Change in alcohol use,   Change in activity, Upcoming invasive procedure, Emergency department   visit, Upcoming dental procedure, Missed doses, Extra doses, Change in   medications, Change in diet/appetite, Hospital admission, Bruising, Other   complaints    Comments:   Appt Fri w/ Cards to determine if warfarin will be   discontinued.      Clinical Outcomes     Negatives:   Major bleeding event, Thromboembolic event,   Anticoagulation-related hospital admission, Anticoagulation-related ED   visit, Anticoagulation-related fatality    Comments:   Appt Fri w/ Cards to determine if warfarin will be   discontinued.        INR History:  Anticoagulation Monitoring 2019 2019 7/10/2019   INR 2.00 2.9 2.0   INR Date 2019 2019 7/10/2019   INR Goal 2.0-3.0 2.0-3.0 2.0-3.0   Trend Same Down Same   Last Week Total 16.25 mg 17.5 mg 16.25 mg   Next Week Total 17.5 mg 16.25 mg 16.25 mg   Sun 2.5 mg 2.5 mg 2.5 mg   Mon 2.5 mg 2.5  mg 2.5 mg   Tue - 1.25 mg 1.25 mg   Wed - 2.5 mg 2.5 mg   Thu 2.5 mg 2.5 mg 2.5 mg   Fri 2.5 mg 2.5 mg 2.5 mg   Sat 2.5 mg 2.5 mg 2.5 mg   Visit Report - - -   Some recent data might be hidden       Plan:  1. INR is Therapeutic today- see above in Anticoagulation Summary.  Will instruct Rasheeda Balderas to Continue their warfarin regimen- see above in Anticoagulation Summary.  2. Follow up in 2 weeks (pt will contact clinic to cancel if warfarin is discontinued 7/12/19)  3. Patient declines warfarin refills.  4. Verbal and written information provided. Patient expresses understanding and has no further questions at this time.    Chapin Gallegos [facility]

## 2019-07-10 NOTE — PROGRESS NOTES
Anticoagulation Clinic Progress Note    Anticoagulation Summary  As of 7/10/2019    INR goal:   2.0-3.0   TTR:   57.6 % (3.3 wk)   INR used for dosin.0 (7/10/2019)   Warfarin maintenance plan:   1.25 mg every Tue; 2.5 mg all other days   Weekly warfarin total:   16.25 mg   No change documented:   Teresita Walton, Pharmacy Intern   Plan last modified:   Jennifer Nathan RPH (2019)   Next INR check:   2019   Target end date:   Indefinite    Indications    Long term (current) use of anticoagulants [Z79.01] [Z79.01]             Anticoagulation Episode Summary     INR check location:       Preferred lab:       Send INR reminders to:   [facility]    Comments:         Anticoagulation Care Providers     Provider Role Specialty Phone number    Cam Hand MD Referring Cardiology [phone]          Clinic Interview:  Patient Findings     Negatives:   Signs/symptoms of thrombosis, Signs/symptoms of bleeding,   Laboratory test error suspected, Change in health, Change in alcohol use,   Change in activity, Upcoming invasive procedure, Emergency department   visit, Upcoming dental procedure, Missed doses, Extra doses, Change in   medications, Change in diet/appetite, Hospital admission, Bruising, Other   complaints      Clinical Outcomes     Negatives:   Major bleeding event, Thromboembolic event,   Anticoagulation-related hospital admission, Anticoagulation-related ED   visit, Anticoagulation-related fatality        INR History:  Anticoagulation Monitoring 2019 2019 7/10/2019   INR 2.00 2.9 2.0   INR Date 2019 2019 7/10/2019   INR Goal 2.0-3.0 2.0-3.0 2.0-3.0   Trend Same Down Same   Last Week Total 16.25 mg 17.5 mg 16.25 mg   Next Week Total 17.5 mg 16.25 mg 16.25 mg   Sun 2.5 mg 2.5 mg 2.5 mg   Mon 2.5 mg 2.5 mg 2.5 mg   Tue - 1.25 mg 1.25 mg   Wed - 2.5 mg 2.5 mg   Thu 2.5 mg 2.5 mg 2.5 mg   Fri 2.5 mg 2.5 mg 2.5 mg   Sat 2.5 mg 2.5 mg 2.5 mg   Visit Report - - -   Some  recent data might be hidden       Plan:  1. INR is therapeutic today- see above in Anticoagulation Summary.   Will instruct Rasheeda Balderas to continue their warfarin regimen- see above in Anticoagulation Summary.  2. Follow up in 4 weeks.  3. Patient declines warfarin refills.  4. Verbal and written information provided. Patient expresses understanding and has no further questions at this time.    Teresita Walton, Pharmacy Intern

## 2019-07-12 ENCOUNTER — ANTICOAGULATION VISIT (OUTPATIENT)
Dept: PHARMACY | Facility: HOSPITAL | Age: 69
End: 2019-07-12

## 2019-07-12 ENCOUNTER — TREATMENT (OUTPATIENT)
Dept: CARDIAC REHAB | Facility: HOSPITAL | Age: 69
End: 2019-07-12

## 2019-07-12 ENCOUNTER — TELEPHONE (OUTPATIENT)
Dept: SURGERY | Facility: CLINIC | Age: 69
End: 2019-07-12

## 2019-07-12 ENCOUNTER — OFFICE VISIT (OUTPATIENT)
Dept: CARDIOLOGY | Facility: CLINIC | Age: 69
End: 2019-07-12

## 2019-07-12 VITALS
DIASTOLIC BLOOD PRESSURE: 64 MMHG | SYSTOLIC BLOOD PRESSURE: 114 MMHG | OXYGEN SATURATION: 97 % | HEART RATE: 89 BPM | BODY MASS INDEX: 22.16 KG/M2 | HEIGHT: 65 IN | WEIGHT: 133 LBS

## 2019-07-12 DIAGNOSIS — Z98.890 S/P MVR (MITRAL VALVE REPAIR): Primary | ICD-10-CM

## 2019-07-12 DIAGNOSIS — Z79.01 LONG TERM (CURRENT) USE OF ANTICOAGULANTS: ICD-10-CM

## 2019-07-12 PROCEDURE — 93000 ELECTROCARDIOGRAM COMPLETE: CPT | Performed by: PHYSICIAN ASSISTANT

## 2019-07-12 PROCEDURE — 93798 PHYS/QHP OP CAR RHAB W/ECG: CPT

## 2019-07-12 PROCEDURE — 99213 OFFICE O/P EST LOW 20 MIN: CPT | Performed by: PHYSICIAN ASSISTANT

## 2019-07-12 RX ORDER — NEBIVOLOL 5 MG/1
1.25 TABLET ORAL DAILY
COMMUNITY
End: 2019-11-06

## 2019-07-12 NOTE — TELEPHONE ENCOUNTER
10/30 MAMMO & US WDC @ 1:00PM; F/U W/ DIONE 11/06 @ 1:30PM. LEFT VM FOR PT TO CALL & CONFIRM.      KL

## 2019-07-12 NOTE — PROGRESS NOTES
Date of Office Visit: 2019  Encounter Provider: ANDREY Baugh  Place of Service: [facility] CARDIOLOGY  Patient Name: Rasheeda Balderas  :1950    Chief Complaint   Patient presents with   • Coronary Artery Disease     1 month follow up   :     HPI: Rasheeda Balderas is a 69 y.o. female who presents today for follow-up.  Old records have been obtained and reviewed by me.  She is a patient of Dr. Barone with a past cardiac history significant for mitral regurgitation.  On 2019, she underwent a complex mitral valve repair with a 38 mm Compa-Hooper partial annuloplasty ring and Drew stitch.  This was performed by Dr. Mayo in [place].  She did follow-up with her surgeon on 2019.  At that visit her Lasix, potassium, and Aldactone were discontinued.  She was started on Aldactazide 25/25 mg 1 tablet daily.  The intention was to stay on this for 6 months.  He also recommended continuing her low-dose Lopressor for 6 months, as well as her amiodarone and warfarin for 1 month after surgery.  He felt that if she remained out of atrial fibrillation for 1 month after surgery, we could discontinue the amiodarone and the warfarin.  She then called our office on 2019 with complaints of a blood pressure in the 80s systolic as well as feeling dizzy.  At that time I advised her to stop her diuretics.  She then saw me on 6/10/2019 and was feeling better.  She was still on amiodarone and warfarin.  I kept her on her medical regimen and she is here today for a follow-up.   Since she was last in our office she is been doing well.  Her primary care doctor was concerned about her low blood pressure at home so her Lopressor was changed to nebivolol 1.25 mg daily.  Her blood pressures have still been a little on the low side at home, sometimes when she wakes up in the morning is in the upper 80s systolic.  She does not think she has had any atrial fibrillation.  She denies any  chest pain, shortness of breath, palpitations, edema, dizziness, or syncope.  She gets a little lightheaded upon standing.  She is participating in cardiac rehab without difficulty.    Past Medical History:   Diagnosis Date   • Arthritis    • CAD (coronary artery disease)    • Cholelithiasis    • Heartburn    • Mitral valve prolapse syndrome    • Myocardial infarction (CMS/HCC)     pt is not aware of history of MI   • Osteoporosis        Past Surgical History:   Procedure Laterality Date   • CARDIAC CATHETERIZATION N/A 3/7/2019    Procedure: Right and Left Heart Cath;  Surgeon: Cam Hand MD;  Location:  [facility];  Service: Cardiology   • CARDIAC CATHETERIZATION N/A 3/7/2019    Procedure: Coronary angiography;  Surgeon: Cam Hand MD;  Location:  [facility] C[facility];  Service: Cardiology   • CARDIAC CATHETERIZATION N/A 3/7/2019    Procedure: Left ventriculography;  Surgeon: Cam Hand MD;  Location:  [facility] CATH [facility]IVE [facility];  Service: Cardiology   • LAPAROSCOPIC CHOLECYSTECTOMY W/ CHOLANGIOGRAPHY     • SHOULDER SURGERY Left        Social History     Socioeconomic History   • Marital status:      Spouse name: Not on file   • Number of children: Not on file   • Years of education: Not on file   • Highest education level: Not on file   Occupational History   • Occupation: professor   Tobacco Use   • Smoking status: Former Smoker   • Smokeless tobacco: Never Used   • Tobacco comment: caffeine use   Substance and Sexual Activity   • Alcohol use: Yes     Alcohol/week: 1.8 oz     Types: 1 Glasses of wine, 1 Cans of beer, 1 Shots of liquor per week     Comment: weekends-socially   • Drug use: No   • Sexual activity: Defer       Family History   Problem Relation Age of Onset   • Brain cancer Mother    • Heart disease Father    • Brain cancer Father    • Stomach cancer Maternal Grandmother    • Cancer Maternal Grandfather    • No Known Problems Paternal Grandmother     • No Known Problems Paternal Grandfather        Review of Systems   Constitution: Negative for chills, fever and malaise/fatigue.   Cardiovascular: Negative for chest pain, dyspnea on exertion, leg swelling, near-syncope, orthopnea, palpitations, paroxysmal nocturnal dyspnea and syncope.   Respiratory: Negative for cough and shortness of breath.    Musculoskeletal: Negative for joint pain, joint swelling and myalgias.   Gastrointestinal: Negative for abdominal pain, diarrhea, melena, nausea and vomiting.   Genitourinary: Negative for frequency and hematuria.   Neurological: Positive for light-headedness. Negative for numbness, paresthesias and seizures.   Allergic/Immunologic: Negative.    All other systems reviewed and are negative.      Allergies   Allergen Reactions   • No Known Drug Allergy          Current Outpatient Medications:   •  acetaminophen (TYLENOL) 500 MG tablet, Take 500 mg by mouth every 6 (six) hours as needed for mild pain (1-3)., Disp: , Rfl:   •  amiodarone (PACERONE) 200 MG tablet, Take 1 tablet by mouth Daily., Disp: 90 tablet, Rfl: 2  •  aspirin 81 MG chewable tablet, Chew 81 mg daily., Disp: , Rfl:   •  b complex-C-folic acid 1 MG capsule, Take 1 capsule by mouth Daily., Disp: , Rfl:   •  Cholecalciferol (VITAMIN D3) 5000 units capsule capsule, Take 5,000 Units by mouth Daily., Disp: , Rfl:   •  ibuprofen (ADVIL,MOTRIN) 400 MG tablet, Take 400 mg by mouth Every 6 (Six) Hours As Needed for Mild Pain ., Disp: , Rfl:   •  metaxalone (SKELAXIN) 800 MG tablet, Take 800 mg by mouth As Needed for Muscle Spasms., Disp: , Rfl:   •  nebivolol (BYSTOLIC) 5 MG tablet, Take 1.25 mg by mouth Daily., Disp: , Rfl:   •  simvastatin (ZOCOR) 20 MG tablet, Take 20 mg by mouth every night., Disp: , Rfl:   •  warfarin (COUMADIN) 2.5 MG tablet, Take 1 tablet daily or as directed, Disp: 90 tablet, Rfl: 0  •  zoledronic acid (RECLAST) 5 MG/100ML solution injection, Infuse 5 mg into a venous catheter 1 (one) time.  "1 time yearly, Disp: , Rfl:       Objective:     Vitals:    07/12/19 1426 07/12/19 1431   BP: 110/60 114/64   BP Location: Right arm Left arm   Pulse: 89    SpO2: 97%    Weight: 60.3 kg (133 lb)    Height: 165.1 cm (65\")      Body mass index is 22.13 kg/m².    PHYSICAL EXAM:    Physical Exam   Constitutional: She is oriented to person, place, and time. She appears well-developed and well-nourished. No distress.   HENT:   Head: Normocephalic and atraumatic.   Eyes: Pupils are equal, round, and reactive to light.   Neck: No JVD present. No thyromegaly present.   Cardiovascular: Normal rate, regular rhythm, normal heart sounds and intact distal pulses.   No murmur heard.  Pulmonary/Chest: Effort normal and breath sounds normal. No respiratory distress.   Abdominal: Soft. Bowel sounds are normal. She exhibits no distension. There is no splenomegaly or hepatomegaly. There is no tenderness.   Musculoskeletal: Normal range of motion. She exhibits no edema.   Neurological: She is alert and oriented to person, place, and time.   Skin: Skin is warm and dry. She is not diaphoretic. No erythema.   Sternal incision well-healed without erythema or edema.   Psychiatric: She has a normal mood and affect. Her behavior is normal. Judgment normal.         ECG 12 Lead  Date/Time: 7/12/2019 2:43 PM  Performed by: Sarah Holley PA  Authorized by: Sarah Holley PA   Comparison: compared with previous ECG from 6/10/2019  Similar to previous ECG  Rhythm: sinus rhythm  Ectopy: unifocal PVCs  BPM: 79  Conduction: 1st degree AV block    Clinical impression: abnormal EKG  Comments: Indication: Status post mitral valve repair.              Assessment:       Diagnosis Plan   1. S/P MVR (mitral valve repair)  ECG 12 Lead    Adult Transthoracic Echo Complete W/ Cont if Necessary Per Protocol     Orders Placed This Encounter   Procedures   • ECG 12 Lead     This order was created via procedure documentation   • Adult Transthoracic Echo " Complete W/ Cont if Necessary Per Protocol     Standing Status:   Future     Order Specific Question:   Reason for exam?     Answer:   Valvular Function     Order Specific Question:   Valvular Function specification?     Answer:   Native Valvular Regurg          Plan:       Overall she is doing great.  She had paroxysmal atrial fibrillation around the time of her surgery.  She has been on amiodarone and warfarin as well as very low-dose beta-blockade.  I am going to discontinue her amiodarone and her warfarin today.  It has been 6 weeks since her surgery.  I am going to keep her on the very low dose of nebivolol, she is having some PVCs today.  I think we can probably discontinue this in the future as well.  I am going to have her follow-up with Dr. Hand in 3 months with an echocardiogram on the same day.    As always, it has been a pleasure to participate in your patient's care.      Sincerely,         Sarah Holley PA-C

## 2019-07-12 NOTE — PROGRESS NOTES
This visit is for documentation purposes only: Patient's warfarin has been discontinued (See 7/12/19 Cardiology appt). No longer following in the [facility] Clinic. It has been a pleasure being part of her care.

## 2019-07-15 ENCOUNTER — TREATMENT (OUTPATIENT)
Dept: CARDIAC REHAB | Facility: HOSPITAL | Age: 69
End: 2019-07-15

## 2019-07-15 DIAGNOSIS — Z98.890 S/P MVR (MITRAL VALVE REPAIR): Primary | ICD-10-CM

## 2019-07-15 PROCEDURE — 93798 PHYS/QHP OP CAR RHAB W/ECG: CPT

## 2019-07-17 ENCOUNTER — TREATMENT (OUTPATIENT)
Dept: CARDIAC REHAB | Facility: HOSPITAL | Age: 69
End: 2019-07-17

## 2019-07-17 DIAGNOSIS — Z98.890 S/P MVR (MITRAL VALVE REPAIR): Primary | ICD-10-CM

## 2019-07-17 PROCEDURE — 93798 PHYS/QHP OP CAR RHAB W/ECG: CPT

## 2019-07-19 ENCOUNTER — TREATMENT (OUTPATIENT)
Dept: CARDIAC REHAB | Facility: HOSPITAL | Age: 69
End: 2019-07-19

## 2019-07-19 DIAGNOSIS — Z98.890 S/P MVR (MITRAL VALVE REPAIR): Primary | ICD-10-CM

## 2019-07-19 PROCEDURE — 93798 PHYS/QHP OP CAR RHAB W/ECG: CPT

## 2019-07-22 ENCOUNTER — TREATMENT (OUTPATIENT)
Dept: CARDIAC REHAB | Facility: HOSPITAL | Age: 69
End: 2019-07-22

## 2019-07-22 DIAGNOSIS — Z98.890 S/P MVR (MITRAL VALVE REPAIR): Primary | ICD-10-CM

## 2019-07-22 PROCEDURE — 93798 PHYS/QHP OP CAR RHAB W/ECG: CPT

## 2019-07-24 ENCOUNTER — TREATMENT (OUTPATIENT)
Dept: CARDIAC REHAB | Facility: HOSPITAL | Age: 69
End: 2019-07-24

## 2019-07-24 DIAGNOSIS — Z98.890 S/P MVR (MITRAL VALVE REPAIR): Primary | ICD-10-CM

## 2019-07-24 PROCEDURE — 93798 PHYS/QHP OP CAR RHAB W/ECG: CPT

## 2019-07-26 ENCOUNTER — TREATMENT (OUTPATIENT)
Dept: CARDIAC REHAB | Facility: HOSPITAL | Age: 69
End: 2019-07-26

## 2019-07-26 ENCOUNTER — TELEPHONE (OUTPATIENT)
Dept: CARDIAC REHAB | Facility: HOSPITAL | Age: 69
End: 2019-07-26

## 2019-07-26 DIAGNOSIS — Z98.890 S/P MVR (MITRAL VALVE REPAIR): Primary | ICD-10-CM

## 2019-07-26 PROCEDURE — 93798 PHYS/QHP OP CAR RHAB W/ECG: CPT

## 2019-07-26 NOTE — TELEPHONE ENCOUNTER
Phoebe Marshall Dr. had several couplets during Cardiac Rehab today. Her resting BP was 90/64 and HR was 100. Her exercising BP was 118/64 and HR was 123. She has felt very fatigued the last two days but was able to get through all of her exercise.

## 2019-07-29 ENCOUNTER — TREATMENT (OUTPATIENT)
Dept: CARDIAC REHAB | Facility: HOSPITAL | Age: 69
End: 2019-07-29

## 2019-07-29 DIAGNOSIS — Z98.890 S/P MVR (MITRAL VALVE REPAIR): Primary | ICD-10-CM

## 2019-07-29 PROCEDURE — 93798 PHYS/QHP OP CAR RHAB W/ECG: CPT

## 2019-07-31 ENCOUNTER — TELEPHONE (OUTPATIENT)
Dept: CARDIAC REHAB | Facility: HOSPITAL | Age: 69
End: 2019-07-31

## 2019-07-31 ENCOUNTER — TELEPHONE (OUTPATIENT)
Dept: CARDIOLOGY | Facility: CLINIC | Age: 69
End: 2019-07-31

## 2019-07-31 ENCOUNTER — TREATMENT (OUTPATIENT)
Dept: CARDIAC REHAB | Facility: HOSPITAL | Age: 69
End: 2019-07-31

## 2019-07-31 DIAGNOSIS — Z98.890 S/P MVR (MITRAL VALVE REPAIR): Primary | ICD-10-CM

## 2019-07-31 PROCEDURE — 93798 PHYS/QHP OP CAR RHAB W/ECG: CPT

## 2019-07-31 NOTE — TELEPHONE ENCOUNTER
I had 2 messages from a cardiac rehab stating that she is having frequent PVCs and couplets and that she had a 3 beat run of VT.  I spoke with the patient.  Other than feeling some fatigue in her legs in the morning, she is doing well.  She states that it is hard for her to walk outside due to the heat but she does not have any problem during rehab.  She drinks a regular Coke for lunch, which happens to coincide with right before she goes to rehab.  I think this is possibly caffeine induced.  Because she is continuing to improve and she does not really have any worsening symptoms, I am good to hold off on an echocardiogram at this time and have asked her to stop drinking caffeine.  If she continues to have PVCs and couplets, then my next step would be an echo.

## 2019-08-02 ENCOUNTER — TREATMENT (OUTPATIENT)
Dept: CARDIAC REHAB | Facility: HOSPITAL | Age: 69
End: 2019-08-02

## 2019-08-02 DIAGNOSIS — Z98.890 S/P MVR (MITRAL VALVE REPAIR): Primary | ICD-10-CM

## 2019-08-02 PROCEDURE — 93798 PHYS/QHP OP CAR RHAB W/ECG: CPT

## 2019-08-05 ENCOUNTER — TREATMENT (OUTPATIENT)
Dept: CARDIAC REHAB | Facility: HOSPITAL | Age: 69
End: 2019-08-05

## 2019-08-05 DIAGNOSIS — Z98.890 S/P MVR (MITRAL VALVE REPAIR): Primary | ICD-10-CM

## 2019-08-05 PROCEDURE — 93798 PHYS/QHP OP CAR RHAB W/ECG: CPT

## 2019-08-07 ENCOUNTER — TREATMENT (OUTPATIENT)
Dept: CARDIAC REHAB | Facility: HOSPITAL | Age: 69
End: 2019-08-07

## 2019-08-07 ENCOUNTER — APPOINTMENT (OUTPATIENT)
Dept: PHARMACY | Facility: HOSPITAL | Age: 69
End: 2019-08-07

## 2019-08-07 DIAGNOSIS — Z98.890 S/P MVR (MITRAL VALVE REPAIR): Primary | ICD-10-CM

## 2019-08-07 PROCEDURE — 93798 PHYS/QHP OP CAR RHAB W/ECG: CPT

## 2019-08-09 ENCOUNTER — TREATMENT (OUTPATIENT)
Dept: CARDIAC REHAB | Facility: HOSPITAL | Age: 69
End: 2019-08-09

## 2019-08-09 DIAGNOSIS — Z98.890 S/P MVR (MITRAL VALVE REPAIR): Primary | ICD-10-CM

## 2019-08-09 PROCEDURE — 93798 PHYS/QHP OP CAR RHAB W/ECG: CPT

## 2019-08-12 ENCOUNTER — APPOINTMENT (OUTPATIENT)
Dept: CARDIAC REHAB | Facility: HOSPITAL | Age: 69
End: 2019-08-12

## 2019-08-14 ENCOUNTER — APPOINTMENT (OUTPATIENT)
Dept: CARDIAC REHAB | Facility: HOSPITAL | Age: 69
End: 2019-08-14

## 2019-08-16 ENCOUNTER — APPOINTMENT (OUTPATIENT)
Dept: CARDIAC REHAB | Facility: HOSPITAL | Age: 69
End: 2019-08-16

## 2019-08-19 ENCOUNTER — APPOINTMENT (OUTPATIENT)
Dept: CARDIAC REHAB | Facility: HOSPITAL | Age: 69
End: 2019-08-19

## 2019-08-21 ENCOUNTER — APPOINTMENT (OUTPATIENT)
Dept: CARDIAC REHAB | Facility: HOSPITAL | Age: 69
End: 2019-08-21

## 2019-08-23 ENCOUNTER — APPOINTMENT (OUTPATIENT)
Dept: CARDIAC REHAB | Facility: HOSPITAL | Age: 69
End: 2019-08-23

## 2019-08-26 ENCOUNTER — APPOINTMENT (OUTPATIENT)
Dept: CARDIAC REHAB | Facility: HOSPITAL | Age: 69
End: 2019-08-26

## 2019-08-28 ENCOUNTER — APPOINTMENT (OUTPATIENT)
Dept: CARDIAC REHAB | Facility: HOSPITAL | Age: 69
End: 2019-08-28

## 2019-08-30 ENCOUNTER — APPOINTMENT (OUTPATIENT)
Dept: CARDIAC REHAB | Facility: HOSPITAL | Age: 69
End: 2019-08-30

## 2019-09-04 ENCOUNTER — APPOINTMENT (OUTPATIENT)
Dept: CARDIAC REHAB | Facility: HOSPITAL | Age: 69
End: 2019-09-04

## 2019-09-06 ENCOUNTER — APPOINTMENT (OUTPATIENT)
Dept: CARDIAC REHAB | Facility: HOSPITAL | Age: 69
End: 2019-09-06

## 2019-09-09 ENCOUNTER — APPOINTMENT (OUTPATIENT)
Dept: CARDIAC REHAB | Facility: HOSPITAL | Age: 69
End: 2019-09-09

## 2019-09-11 ENCOUNTER — APPOINTMENT (OUTPATIENT)
Dept: CARDIAC REHAB | Facility: HOSPITAL | Age: 69
End: 2019-09-11

## 2019-09-13 ENCOUNTER — APPOINTMENT (OUTPATIENT)
Dept: CARDIAC REHAB | Facility: HOSPITAL | Age: 69
End: 2019-09-13

## 2019-09-16 ENCOUNTER — APPOINTMENT (OUTPATIENT)
Dept: CARDIAC REHAB | Facility: HOSPITAL | Age: 69
End: 2019-09-16

## 2019-09-18 ENCOUNTER — APPOINTMENT (OUTPATIENT)
Dept: CARDIAC REHAB | Facility: HOSPITAL | Age: 69
End: 2019-09-18

## 2019-09-20 ENCOUNTER — APPOINTMENT (OUTPATIENT)
Dept: CARDIAC REHAB | Facility: HOSPITAL | Age: 69
End: 2019-09-20

## 2019-09-23 ENCOUNTER — APPOINTMENT (OUTPATIENT)
Dept: CARDIAC REHAB | Facility: HOSPITAL | Age: 69
End: 2019-09-23

## 2019-10-14 ENCOUNTER — OFFICE VISIT (OUTPATIENT)
Dept: ORTHOPEDIC SURGERY | Facility: CLINIC | Age: 69
End: 2019-10-14

## 2019-10-14 VITALS — HEIGHT: 65 IN | BODY MASS INDEX: 22.99 KG/M2 | WEIGHT: 138 LBS

## 2019-10-14 DIAGNOSIS — M25.512 LEFT SHOULDER PAIN, UNSPECIFIED CHRONICITY: ICD-10-CM

## 2019-10-14 DIAGNOSIS — M25.551 RIGHT HIP PAIN: Primary | ICD-10-CM

## 2019-10-14 DIAGNOSIS — Z96.612 STATUS POST REPLACEMENT OF LEFT SHOULDER JOINT: ICD-10-CM

## 2019-10-14 DIAGNOSIS — R52 PAIN: ICD-10-CM

## 2019-10-14 PROCEDURE — 99204 OFFICE O/P NEW MOD 45 MIN: CPT | Performed by: ORTHOPAEDIC SURGERY

## 2019-10-14 PROCEDURE — 73502 X-RAY EXAM HIP UNI 2-3 VIEWS: CPT | Performed by: ORTHOPAEDIC SURGERY

## 2019-10-14 PROCEDURE — 73030 X-RAY EXAM OF SHOULDER: CPT | Performed by: ORTHOPAEDIC SURGERY

## 2019-10-14 RX ORDER — MELOXICAM 15 MG/1
TABLET ORAL
Qty: 30 TABLET | Refills: 0 | Status: SHIPPED | OUTPATIENT
Start: 2019-10-14 | End: 2020-07-24

## 2019-10-14 NOTE — PROGRESS NOTES
New Left Shoulder      Patient: Rasheeda Balderas        YOB: 1950    Medical Record Number: [id number]        Chief Complaints: left shoulder pain      History of Present Illness: This is a 69-year-old female who presents complaining of left shoulder pain she is right-hand dominant she had a partial shoulder replacement many [facility] ago [facility] she states it does not feel like that it hurting more down the biceps area no new history injury change in activity she is been quite happy with her function left shoulder up to this point.  Also complaining of some right thigh and right groin pain which she would like looked at as well.  Current symptoms in both are moderate intermittent aching worse with activity somewhat better with rest she is retired past medical history smart for mitral valve prolapse and osteoporosis    Allergies:   Allergies   Allergen Reactions   • No Known Drug Allergy        Medications:   Home Medications:  Current Outpatient Medications on File Prior to Visit   Medication Sig   • acetaminophen (TYLENOL) 500 MG tablet Take 500 mg by mouth every 6 (six) hours as needed for mild pain (1-3).   • amiodarone (PACERONE) 200 MG tablet Take 1 tablet by mouth Daily.   • aspirin 81 MG chewable tablet Chew 81 mg daily.   • b complex-C-folic acid 1 MG capsule Take 1 capsule by mouth Daily.   • Cholecalciferol (VITAMIN D3) 5000 units capsule capsule Take 5,000 Units by mouth Daily.   • metaxalone (SKELAXIN) 800 MG tablet Take 800 mg by mouth As Needed for Muscle Spasms.   • nebivolol (BYSTOLIC) 5 MG tablet Take 1.25 mg by mouth Daily.   • simvastatin (ZOCOR) 20 MG tablet Take 20 mg by mouth every night.   • zoledronic acid (RECLAST) 5 MG/100ML solution injection Infuse 5 mg into a venous catheter 1 (one) time. 1 time yearly     No current facility-administered medications on file prior to visit.      Current Medications:  Scheduled Meds:  Continuous Infusions:  No current  facility-administered medications for this visit.   PRN Meds:.    Past Medical History:   Diagnosis Date   • Arthritis    • CAD (coronary artery disease)    • Cholelithiasis    • Heartburn    • Mitral valve prolapse syndrome    • Myocardial infarction (CMS/HCC)     pt is not aware of history of MI   • Osteoporosis         Past Surgical History:   Procedure Laterality Date   • CARDIAC CATHETERIZATION N/A 3/7/2019    Procedure: Right and Left Heart Cath;  Surgeon: Cam Hand MD;  Location:  [facility] CATH INVASIVE LOCATION;  Service: Cardiology   • CARDIAC CATHETERIZATION N/A 3/7/2019    Procedure: Coronary angiography;  Surgeon: Cam Hand MD;  Location:  [facility] CATH INVASIVE LOCATION;  Service: Cardiology   • CARDIAC CATHETERIZATION N/A 3/7/2019    Procedure: Left ventriculography;  Surgeon: Cam Hand MD;  Location:  [facility] CATH INVASIVE LOCATION;  Service: Cardiology   • LAPAROSCOPIC CHOLECYSTECTOMY W/ CHOLANGIOGRAPHY     • SHOULDER SURGERY Left         Social History     Occupational History   • Occupation: professor   Tobacco Use   • Smoking status: Former Smoker   • Smokeless tobacco: Never Used   • Tobacco comment: caffeine use   Substance and Sexual Activity   • Alcohol use: Yes     Alcohol/week: 1.8 oz     Types: 1 Glasses of wine, 1 Cans of beer, 1 Shots of liquor per week     Comment: weekends-socially   • Drug use: No   • Sexual activity: Defer      Social History     Social History Narrative   • Not on file        Family History   Problem Relation Age of Onset   • Brain cancer Mother    • Heart disease Father    • Brain cancer Father    • Stomach cancer Maternal Grandmother    • Cancer Maternal Grandfather    • No Known Problems Paternal Grandmother    • No Known Problems Paternal Grandfather              Review of Systems: 14 point review of systems are remarkable for the pertinent positives listed in the chart by the patient the remainder negative    Review of Systems      Physical Exam: 69  "y.o. female  General Appearance:    Alert, cooperative, in no acute distress                   Vitals:    10/14/19 1412   Weight: 62.6 kg (138 lb)   Height: 165.1 cm (65\")      Patient is alert and read ×3 no acute distress appears her above-listed at height weight and age.  Affect is normal respiratory rate is normal unlabored. Heart rate regular rate rhythm, sclera, dentition and hearing are normal for the purpose of this exam.    Ortho Exam Physical exam of the left shoulder reveals no overlying skin changes no lymphedema no lymphadenopathy.  Patient has active flexion 180 with mild symptoms abduction is similar external rotation is to 50 and internal rotation to the upper lumbar spine with mild symptoms.  Patient has good rotator cuff strength 4+ over 5 with isometric strength testing with pain.  Patient has a positive impingement and a positive Michaud sign.  Patient has good cervical range of motion which is full and asymptomatic no radicular symptoms.  Patient has a normal elbow exam.  Good distal pulses are presentPatient has pain with overhead activity and a positive Neer sign and a positive empty can sign  They have a positive drop arm any definitive painful arc      Physical exam the right hip she does have decreased internal and external rotation she can flex to about 100 all with pain in the area the groin no radicular symptoms no overlying skin changes she is neurologically intact manual muscle test and sensation    Procedures          Radiology:   AP, Scapular Y and Axillary Lateral of the left shoulder were ordered/reviewed to evauate shoulder pain.  I am no compared to films she has evidence of hemiarthroplasty with some degenerative change of the glenohumeral joint but had appears to be well-seated within the glenoid.  Also took AP the pelvis lateral the right hip to evaluate her symptoms with no compared to films she has degenerative changes I would consider severe with loss of joint space and " osteophyte formation no acute pathology is seen  Imaging Results (most recent)     Procedure Component Value Units Date/Time    XR Hip With or Without Pelvis 2 - 3 View Right [[id number]] Resulted:  10/14/19 1457     Updated:  10/14/19 1457    Impression:       Ordering physician's impression is located in the Encounter Note dated 10/14/19. X-ray performed in the DR room.      XR Shoulder 2+ View Left [[id number]] Resulted:  10/14/19 1409     Updated:  10/14/19 1409    Impression:       Ordering physician's impression is located in the Encounter Note dated 10/14/19. X-ray performed in the DR room.          Assessment/Plan: Left shoulder pain status post hemiarthroplasty as could be impingement irritation of the rotator cuff.  In view the fact she has a partial shoulder replacement I will hand her to Dr. Nazario I did send her pictures of her x-rays to Dr. Nazario with her permission.  This may well be irritation of the rotator cuff and he might consider an injection.  I would like him to see her first.  As far as her hip goes I think is degenerative in origin think she benefit from an injection under fluoroscopy which we will order

## 2019-10-25 ENCOUNTER — OFFICE VISIT (OUTPATIENT)
Dept: CARDIOLOGY | Facility: CLINIC | Age: 69
End: 2019-10-25

## 2019-10-25 ENCOUNTER — HOSPITAL ENCOUNTER (OUTPATIENT)
Dept: CARDIOLOGY | Facility: HOSPITAL | Age: 69
Discharge: HOME | End: 2019-10-25
Admitting: PHYSICIAN ASSISTANT

## 2019-10-25 VITALS
DIASTOLIC BLOOD PRESSURE: 76 MMHG | BODY MASS INDEX: 22.99 KG/M2 | HEART RATE: 77 BPM | HEIGHT: 65 IN | WEIGHT: 138 LBS | SYSTOLIC BLOOD PRESSURE: 114 MMHG

## 2019-10-25 VITALS
HEIGHT: 65 IN | SYSTOLIC BLOOD PRESSURE: 110 MMHG | WEIGHT: 135 LBS | HEART RATE: 73 BPM | BODY MASS INDEX: 22.49 KG/M2 | DIASTOLIC BLOOD PRESSURE: 80 MMHG

## 2019-10-25 DIAGNOSIS — Z98.890 S/P MVR (MITRAL VALVE REPAIR): ICD-10-CM

## 2019-10-25 DIAGNOSIS — I48.0 PAROXYSMAL ATRIAL FIBRILLATION (HCC): ICD-10-CM

## 2019-10-25 DIAGNOSIS — I34.0 NON-RHEUMATIC MITRAL REGURGITATION: ICD-10-CM

## 2019-10-25 DIAGNOSIS — I34.1 MITRAL VALVE PROLAPSE: Primary | ICD-10-CM

## 2019-10-25 LAB
AORTIC ARCH: 2.4 CM
AORTIC ROOT ANNULUS: 1.9 CM
ASCENDING AORTA: 3.2 CM
BH CV ECHO MEAS - ACS: 2.3 CM
BH CV ECHO MEAS - AO MAX PG (FULL): 3.5 MMHG
BH CV ECHO MEAS - AO MAX PG: 7.1 MMHG
BH CV ECHO MEAS - AO MEAN PG (FULL): 3 MMHG
BH CV ECHO MEAS - AO MEAN PG: 5 MMHG
BH CV ECHO MEAS - AO V2 MAX: 133 CM/SEC
BH CV ECHO MEAS - AO V2 MEAN: 104 CM/SEC
BH CV ECHO MEAS - AO V2 VTI: 26.7 CM
BH CV ECHO MEAS - ASC AORTA: 3.2 CM
BH CV ECHO MEAS - AVA(I,A): 1.9 CM^2
BH CV ECHO MEAS - AVA(I,D): 1.9 CM^2
BH CV ECHO MEAS - AVA(V,A): 2.1 CM^2
BH CV ECHO MEAS - AVA(V,D): 2.1 CM^2
BH CV ECHO MEAS - BSA(HAYCOCK): 1.7 M^2
BH CV ECHO MEAS - BSA: 1.7 M^2
BH CV ECHO MEAS - BZI_BMI: 23 KILOGRAMS/M^2
BH CV ECHO MEAS - BZI_METRIC_HEIGHT: 165.1 CM
BH CV ECHO MEAS - BZI_METRIC_WEIGHT: 62.6 KG
BH CV ECHO MEAS - EDV(MOD-SP2): 113 ML
BH CV ECHO MEAS - EDV(MOD-SP4): 113 ML
BH CV ECHO MEAS - EDV(TEICH): 94.7 ML
BH CV ECHO MEAS - EF(CUBED): 62 %
BH CV ECHO MEAS - EF(MOD-BP): 60 %
BH CV ECHO MEAS - EF(MOD-SP2): 56.6 %
BH CV ECHO MEAS - EF(MOD-SP4): 61.9 %
BH CV ECHO MEAS - EF(TEICH): 53.6 %
BH CV ECHO MEAS - ESV(MOD-SP2): 49 ML
BH CV ECHO MEAS - ESV(MOD-SP4): 43 ML
BH CV ECHO MEAS - ESV(TEICH): 43.9 ML
BH CV ECHO MEAS - FS: 27.6 %
BH CV ECHO MEAS - IVS/LVPW: 1.1
BH CV ECHO MEAS - IVSD: 1.2 CM
BH CV ECHO MEAS - LAT PEAK E' VEL: 6 CM/SEC
BH CV ECHO MEAS - LV DIASTOLIC VOL/BSA (35-75): 66.9 ML/M^2
BH CV ECHO MEAS - LV MASS(C)D: 196.7 GRAMS
BH CV ECHO MEAS - LV MASS(C)DI: 116.5 GRAMS/M^2
BH CV ECHO MEAS - LV MAX PG: 3.6 MMHG
BH CV ECHO MEAS - LV MEAN PG: 2 MMHG
BH CV ECHO MEAS - LV SYSTOLIC VOL/BSA (12-30): 25.5 ML/M^2
BH CV ECHO MEAS - LV V1 MAX: 94.8 CM/SEC
BH CV ECHO MEAS - LV V1 MEAN: 68.6 CM/SEC
BH CV ECHO MEAS - LV V1 VTI: 17.6 CM
BH CV ECHO MEAS - LVIDD: 4.5 CM
BH CV ECHO MEAS - LVIDS: 3.3 CM
BH CV ECHO MEAS - LVLD AP2: 7 CM
BH CV ECHO MEAS - LVLD AP4: 6.7 CM
BH CV ECHO MEAS - LVLS AP2: 6.3 CM
BH CV ECHO MEAS - LVLS AP4: 5.9 CM
BH CV ECHO MEAS - LVOT AREA (M): 2.8 CM^2
BH CV ECHO MEAS - LVOT AREA: 2.9 CM^2
BH CV ECHO MEAS - LVOT DIAM: 1.9 CM
BH CV ECHO MEAS - LVPWD: 1.1 CM
BH CV ECHO MEAS - MED PEAK E' VEL: 4 CM/SEC
BH CV ECHO MEAS - MV A DUR: 0.14 SEC
BH CV ECHO MEAS - MV A MAX VEL: 104 CM/SEC
BH CV ECHO MEAS - MV DEC SLOPE: 352 CM/SEC^2
BH CV ECHO MEAS - MV DEC TIME: 0.61 SEC
BH CV ECHO MEAS - MV E MAX VEL: 126 CM/SEC
BH CV ECHO MEAS - MV E/A: 1.2
BH CV ECHO MEAS - MV MAX PG: 9.6 MMHG
BH CV ECHO MEAS - MV MEAN PG: 5 MMHG
BH CV ECHO MEAS - MV P1/2T MAX VEL: 153 CM/SEC
BH CV ECHO MEAS - MV P1/2T: 127.3 MSEC
BH CV ECHO MEAS - MV V2 MAX: 155 CM/SEC
BH CV ECHO MEAS - MV V2 MEAN: 112 CM/SEC
BH CV ECHO MEAS - MV V2 VTI: 53.9 CM
BH CV ECHO MEAS - MVA P1/2T LCG: 1.4 CM^2
BH CV ECHO MEAS - MVA(P1/2T): 1.7 CM^2
BH CV ECHO MEAS - MVA(VTI): 0.96 CM^2
BH CV ECHO MEAS - PA ACC TIME: 0.15 SEC
BH CV ECHO MEAS - PA MAX PG (FULL): 0.54 MMHG
BH CV ECHO MEAS - PA MAX PG: 1.4 MMHG
BH CV ECHO MEAS - PA PR(ACCEL): 12.5 MMHG
BH CV ECHO MEAS - PA V2 MAX: 58.4 CM/SEC
BH CV ECHO MEAS - PULM A REVS DUR: 0.07 SEC
BH CV ECHO MEAS - PULM A REVS VEL: 27.8 CM/SEC
BH CV ECHO MEAS - PULM DIAS VEL: 45.2 CM/SEC
BH CV ECHO MEAS - PULM S/D: 1.1
BH CV ECHO MEAS - PULM SYS VEL: 51.9 CM/SEC
BH CV ECHO MEAS - PVA(V,A): 2.8 CM^2
BH CV ECHO MEAS - PVA(V,D): 2.8 CM^2
BH CV ECHO MEAS - QP/QS: 0.64
BH CV ECHO MEAS - RAP SYSTOLE: 8 MMHG
BH CV ECHO MEAS - RV MAX PG: 0.83 MMHG
BH CV ECHO MEAS - RV MEAN PG: 0.47 MMHG
BH CV ECHO MEAS - RV V1 MAX: 45.4 CM/SEC
BH CV ECHO MEAS - RV V1 MEAN: 31.9 CM/SEC
BH CV ECHO MEAS - RV V1 VTI: 9.4 CM
BH CV ECHO MEAS - RVOT AREA: 3.5 CM^2
BH CV ECHO MEAS - RVOT DIAM: 2.1 CM
BH CV ECHO MEAS - RVSP: 39 MMHG
BH CV ECHO MEAS - SI(CUBED): 34.5 ML/M^2
BH CV ECHO MEAS - SI(LVOT): 30.7 ML/M^2
BH CV ECHO MEAS - SI(MOD-SP2): 37.9 ML/M^2
BH CV ECHO MEAS - SI(MOD-SP4): 41.4 ML/M^2
BH CV ECHO MEAS - SI(TEICH): 30 ML/M^2
BH CV ECHO MEAS - SUP REN AO DIAM: 2.8 CM
BH CV ECHO MEAS - SV(CUBED): 58.2 ML
BH CV ECHO MEAS - SV(LVOT): 51.8 ML
BH CV ECHO MEAS - SV(MOD-SP2): 64 ML
BH CV ECHO MEAS - SV(MOD-SP4): 70 ML
BH CV ECHO MEAS - SV(RVOT): 33.3 ML
BH CV ECHO MEAS - SV(TEICH): 50.8 ML
BH CV ECHO MEAS - TAPSE (>1.6): 1.9 CM2
BH CV ECHO MEAS - TR MAX VEL: 244 CM/SEC
BH CV ECHO MEASUREMENTS AVERAGE E/E' RATIO: 25.2
BH CV XLRA - RV BASE: 3.6 CM
BH CV XLRA - RV LENGTH: 6.7 CM
BH CV XLRA - RV MID: 3.4 CM
BH CV XLRA - TDI S': 14 CM/SEC
LEFT ATRIUM VOLUME INDEX: 60 ML/M2
SINUS: 3.3 CM
STJ: 2.8 CM

## 2019-10-25 PROCEDURE — 93000 ELECTROCARDIOGRAM COMPLETE: CPT | Performed by: INTERNAL MEDICINE

## 2019-10-25 PROCEDURE — 93306 TTE W/DOPPLER COMPLETE: CPT

## 2019-10-25 PROCEDURE — 99214 OFFICE O/P EST MOD 30 MIN: CPT | Performed by: INTERNAL MEDICINE

## 2019-10-25 PROCEDURE — 93306 TTE W/DOPPLER COMPLETE: CPT | Performed by: INTERNAL MEDICINE

## 2019-10-25 RX ORDER — MELOXICAM 15 MG/1
15 TABLET ORAL DAILY
COMMUNITY
End: 2020-07-24

## 2019-10-25 NOTE — PROGRESS NOTES
Date of Office Visit: 10/25/19  Encounter Provider: Cam Hand MD  Place of Service: [facility] CARDIOLOGY  Patient Name: Rasheeda Balderas  :1950  [id number]    Chief Complaint   Patient presents with   • Coronary Artery Disease   :     HPI: Rasheeda Balderas is a 69 y.o. female she is here for follow-up she had a mitral valve repair at the [facility] with an annuloplasty ring ended up having to have an Drew stitch in the mitral valve she is here for follow-up now that was in July.  She did have a coronary disease preoperatively she is been doing pretty well she had a episode here a couple days ago where she got really weak but otherwise is been doing well no chest pain shortness of breath PND orthopnea edema syncope palpitations    Past Medical History:   Diagnosis Date   • Arthritis    • CAD (coronary artery disease)    • Cholelithiasis    • Heartburn    • Mitral valve prolapse syndrome    • Myocardial infarction (CMS/HCC)     pt is not aware of history of MI   • Osteoporosis        Past Surgical History:   Procedure Laterality Date   • CARDIAC CATHETERIZATION N/A 3/7/2019    Procedure: Right and Left Heart Cath;  Surgeon: Cam Hand MD;  Location: [facility] CATH INVASIVE LOCATION;  Service: Cardiology   • CARDIAC CATHETERIZATION N/A 3/7/2019    Procedure: Coronary angiography;  Surgeon: Cam Hand MD;  [facility];  Service: Cardiology   • CARDIAC CATHETERIZATION N/A 3/7/2019    Procedure: Left ventriculography;  Surgeon: Cam Hand MD;  Location: [facility] CATH INVASIVE LOCATION;  Service: Cardiology   • LAPAROSCOPIC CHOLECYSTECTOMY W/ CHOLANGIOGRAPHY     • SHOULDER SURGERY Left        Social History     Socioeconomic History   • Marital status:      Spouse name: Not on file   • Number of children: Not on file   • Years of education: Not on file   • Highest education level: Not on file   Occupational History   •  Occupation: professor   Tobacco Use   • Smoking status: Former Smoker   • Smokeless tobacco: Never Used   • Tobacco comment: caffeine use   Substance and Sexual Activity   • Alcohol use: Yes     Alcohol/week: 1.8 oz     Types: 1 Glasses of wine, 1 Cans of beer, 1 Shots of liquor per week     Comment: weekends-socially   • Drug use: No   • Sexual activity: Defer       Family History   Problem Relation Age of Onset   • Brain cancer Mother    • Heart disease Father    • Brain cancer Father    • Stomach cancer Maternal Grandmother    • Cancer Maternal Grandfather    • No Known Problems Paternal Grandmother    • No Known Problems Paternal Grandfather        Review of Systems   Constitution: Negative for decreased appetite, fever, malaise/fatigue and weight loss.   HENT: Negative for nosebleeds.    Eyes: Negative for double vision.   Cardiovascular: Negative for chest pain, claudication, cyanosis, dyspnea on exertion, irregular heartbeat, leg swelling, near-syncope, orthopnea, palpitations, paroxysmal nocturnal dyspnea and syncope.   Respiratory: Negative for cough, hemoptysis and shortness of breath.    Hematologic/Lymphatic: Negative for bleeding problem.   Skin: Negative for rash.   Musculoskeletal: Negative for falls and myalgias.   Gastrointestinal: Negative for hematochezia, jaundice, melena, nausea and vomiting.   Genitourinary: Negative for hematuria.   Neurological: Negative for dizziness and seizures.   Psychiatric/Behavioral: Negative for altered mental status and memory loss.       Allergies   Allergen Reactions   • No Known Drug Allergy          Current Outpatient Medications:   •  acetaminophen (TYLENOL) 500 MG tablet, Take 500 mg by mouth every 6 (six) hours as needed for mild pain (1-3)., Disp: , Rfl:   •  aspirin 81 MG chewable tablet, Chew 81 mg daily., Disp: , Rfl:   •  b complex-C-folic acid 1 MG capsule, Take 1 capsule by mouth Daily., Disp: , Rfl:   •  Cholecalciferol (VITAMIN D3) 5000 units capsule  "capsule, Take 5,000 Units by mouth Daily., Disp: , Rfl:   •  meloxicam (MOBIC) 15 MG tablet, Take 15 mg by mouth Daily., Disp: , Rfl:   •  metaxalone (SKELAXIN) 800 MG tablet, Take 800 mg by mouth As Needed for Muscle Spasms., Disp: , Rfl:   •  simvastatin (ZOCOR) 20 MG tablet, Take 20 mg by mouth every night., Disp: , Rfl:   •  zoledronic acid (RECLAST) 5 MG/100ML solution injection, Infuse 5 mg into a venous catheter 1 (one) time. 1 time yearly, Disp: , Rfl:   •  amiodarone (PACERONE) 200 MG tablet, Take 1 tablet by mouth Daily., Disp: 90 tablet, Rfl: 2  •  meloxicam (MOBIC) 15 MG tablet, 1 PO Daily with food., Disp: 30 tablet, Rfl: 0  •  nebivolol (BYSTOLIC) 5 MG tablet, Take 1.25 mg by mouth Daily., Disp: , Rfl:       Objective:     Vitals:    10/25/19 1509   BP: 110/80   Pulse: 73   Weight: 61.2 kg (135 lb)   Height: 165.1 cm (65\")     Body mass index is 22.47 kg/m².    Physical Exam   Constitutional: She is oriented to person, place, and time. She appears well-developed and well-nourished.   HENT:   Head: Normocephalic.   Eyes: No scleral icterus.   Neck: No JVD present. No thyromegaly present.   Cardiovascular: Normal rate, regular rhythm and normal heart sounds. Exam reveals no gallop and no friction rub.   No murmur heard.  Pulmonary/Chest: Effort normal and breath sounds normal. She has no wheezes. She has no rales.   Abdominal: Soft. There is no hepatosplenomegaly. There is no tenderness.   Musculoskeletal: Normal range of motion. She exhibits no edema.   Lymphadenopathy:     She has no cervical adenopathy.   Neurological: She is alert and oriented to person, place, and time.   Skin: Skin is warm and dry. No rash noted.   Psychiatric: She has a normal mood and affect.         ECG 12 Lead  Date/Time: 10/25/2019 4:23 PM  Performed by: Cam Hand MD  Authorized by: Cam Hand MD   Comparison: compared with previous ECG   Similar to previous ECG  Rhythm: sinus rhythm    Clinical impression: normal " ECG             Assessment:       Diagnosis Plan   1. Mitral valve prolapse     2. Non-rheumatic mitral regurgitation     3. Paroxysmal atrial fibrillation (CMS/HCC)     4. S/P MVR (mitral valve repair)            Plan:       I think she is doing well I think her spell the other day was a little bit of probably hypotension she chronically runs lowish blood pressures and I think if she got a little bit dehydrated she could get a little orthostatic I think her echo looks great today I am not can make any changes when to have her come back and see us in a year or sooner if she has trouble    As always, it has been a pleasure to participate in your patient's care.      Sincerely,       Cam Hand MD

## 2019-10-28 ENCOUNTER — TELEPHONE (OUTPATIENT)
Dept: CARDIOLOGY | Facility: CLINIC | Age: 69
End: 2019-10-28

## 2019-10-28 NOTE — TELEPHONE ENCOUNTER
Patient was in on 10/25/19   Left a paper with her BP and Heart Rate  Forgot to ask you about.    9/30/19 96/77 pulse 126  10/1/19 92/78 pulse 112  10/2/19 89/71 pulse 117  10/3/19 93/74 pulse 112  10/5/19 91/74 pulse 108  10/7/19 86/74 pulse 108  10/7/19 86/74 pulse 108  10/11/19 104/77 pulse 94  10/15/19 109/81 pulse 94    States she does not feel this put show fast heart rate on her machine  Was wondering why it would run high    [phone]

## 2019-10-29 NOTE — TELEPHONE ENCOUNTER
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

## 2019-10-30 ENCOUNTER — APPOINTMENT (OUTPATIENT)
Dept: WOMENS IMAGING | Facility: HOSPITAL | Age: 69
End: 2019-10-30

## 2019-10-30 PROCEDURE — 77063 BREAST TOMOSYNTHESIS BI: CPT | Performed by: RADIOLOGY

## 2019-10-30 PROCEDURE — 76641 ULTRASOUND BREAST COMPLETE: CPT | Performed by: RADIOLOGY

## 2019-10-30 PROCEDURE — 77080 DXA BONE DENSITY AXIAL: CPT | Performed by: RADIOLOGY

## 2019-10-30 PROCEDURE — 77067 SCR MAMMO BI INCL CAD: CPT | Performed by: RADIOLOGY

## 2019-11-01 ENCOUNTER — TELEPHONE (OUTPATIENT)
Dept: SURGERY | Facility: CLINIC | Age: 69
End: 2019-11-01

## 2019-11-04 ENCOUNTER — TELEPHONE (OUTPATIENT)
Dept: SURGERY | Facility: CLINIC | Age: 69
End: 2019-11-04

## 2019-11-04 NOTE — TELEPHONE ENCOUNTER
[facility] October 30, 2019 bilateral screening mammogram with 3D.  There are scattered areas of fibroglandular density.  Follow-up complicated cyst left breast 12:00.  Does not persist.  Bilateral breast ultrasound:  Follow-up complicated cyst right breast 830.  Oval complicated cyst measuring 5 mm posterior one third 830, 5 cm from the nipple with no change.  Follow-up complicated cyst left breast 12:00 no evidence of any solid or cystic element.  Finding #3 round simple cyst measuring 7 mm at 130 left breast.  Impression complicated cyst in the right breast is probably benign follow-up ultrasound of the right breast in 6 months.  BI-RADS 3

## 2019-11-06 ENCOUNTER — OFFICE VISIT (OUTPATIENT)
Dept: SURGERY | Facility: CLINIC | Age: 69
End: 2019-11-06

## 2019-11-06 VITALS
OXYGEN SATURATION: 97 % | DIASTOLIC BLOOD PRESSURE: 75 MMHG | HEIGHT: 65 IN | SYSTOLIC BLOOD PRESSURE: 106 MMHG | BODY MASS INDEX: 21.99 KG/M2 | WEIGHT: 132 LBS | HEART RATE: 101 BPM

## 2019-11-06 DIAGNOSIS — R92.8 ABNORMAL ULTRASOUND OF BREAST: Primary | ICD-10-CM

## 2019-11-06 DIAGNOSIS — N60.19 FIBROCYSTIC BREAST DISEASE (FCBD), UNSPECIFIED LATERALITY: ICD-10-CM

## 2019-11-06 PROCEDURE — 99212 OFFICE O/P EST SF 10 MIN: CPT | Performed by: SURGERY

## 2019-11-06 NOTE — PROGRESS NOTES
Chief Complaint: Rasheeda Balderas is a 69 y.o. female who was seen in consultation at the request of Malini Simeon MD  for breast cysts and a followup visit    History of Present Illness:  Patient presents with abnormal breast imaging. She noted no new masses, skin changes, nipple discharge, nipple changes prior to her most recent imaging.  Her most recent imaging includes the followin2017  [facility]   Mammo  Rasheeda Balderas  BILATERAL MAMMOGRAPHY  Scattered areas of fibroglandular density.  BI-RADS CATEGORY: 2, Benign.    2018  [facility]   Mammo  Rasheeda Balderas  BILATERAL SCREENING MAMMOGRAM WITH TOMOSYNTHESIS  Scattered areas of fibroglandular density.  Finding 1: High density, oval mass measuring 5 millimeters posterior one-third left breast at 12 o’clock located 8 centimeters from the nipple.  Finding 2: Small, ova mass measuring 4 millimeters seen in the posterior one-third 8:30 o’clock region of the right breast located 5 centimeters from the nipple.  IMPRESSION:  BI-RADS CATEGORY: 0    10/09/2018  [facility]   Mammo  Rasheeda Balderas  BILATERAL DIAGNOSTIC MAMMOGRAM WITH TOMOSYNTHESIS  Finding 1: Oval mass in the left breast, 12 o’clock.  Finding 2: Oval mass in the right breast, 8:30 o’clock.  BILATERAL BREAST ULTRASOUND  Finding 1: Oval complicated cyst measuring 8 x 3 x 6 mm.  Finding 2: Oval complicated cyst measuring 5 x 3 x 5 mm.  IMPRESSION:  Finding 1: Probably benign. Follow-up ultrasound exam in 6 months.  Finding 2: Complicated cyst in the right breast is probably benign. Follow-up ultrasound exam in 6 months.  BI-RADS CATEGORY: 3, Probably Benign.    She has not had a breast biopsy in the past.  She has her uterus and ovaries, is postmenopausal, and takes nor hormones. She took HRT for 2 years, remotely.  Her family history includes the following: she has no children,.  One sister, no maternal aunts and 2 paternal aunts.  No history of breast or  ovarian cancer in her family.      In the interim,  Rasheeda Balderas  has done well.  She has noted no changes in her breast exam. No new masses, skin changes, nipple changes, nipple discharge either breast.     Her most recent imaging includes the following:    [facility] April 26, 2019 bilateral breast ultrasound:  1.  Follow-up oval mass 12:00.  Stable complicated cyst 8 mm posterior one third left breast 12:00, 8 cm from the nipple.  No significant change.  Follow-up oval mass right breast 830 stable complicated cyst measuring 5 mm posterior one third 830, 5 cm from the nipple no significant change.  BI-RADS 3 recommend ultrasound in 6 months.  Her screening mammogram is due in 6 months.    Interval History:  In the interim,  Rasheeda Balderas  has done well.  She has noted no changes in her breast exam. No new masses, skin changes, nipple changes, nipple discharge either breast.       Her most recent imaging includes the following:  [facility] [place], 2019 bilateral screening mammogram with 3D.  There are scattered areas of fibroglandular density.  Follow-up complicated cyst left breast 12:00.  Does not persist.  Bilateral breast ultrasound:  Follow-up complicated cyst right breast 830.  Oval complicated cyst measuring 5 mm posterior one third 830, 5 cm from the nipple with no change.  Follow-up complicated cyst left breast 12:00 no evidence of any solid or cystic element.  Finding #3 round simple cyst measuring 7 mm at 130 left breast.  Impression complicated cyst in the right breast is probably benign follow-up ultrasound of the right breast in 6 months.  BI-RADS 3    She is here to review.    Review of Systems:  Review of Systems   Constitutional: Negative for unexpected weight change (4 lb wt loss).   HENT:   Positive for sore throat.    Musculoskeletal: Positive for arthralgias.   All other systems reviewed and are negative.       Past Medical and Surgical History:  Breast Biopsy  History:  Patient has not had a breast biopsy in the past.  Breast Cancer HIstory:  Patient does not have a past medical history of breast cancer.  Breast Operations, and year:  NONE   Obstetric/Gynecologic History:  Age menstrual periods began: 13  Patient is postmenopausal, entered menopause naturally at age: LATE 40S   Number of pregnancies: 0  Number of live births: 0  Number of abortions or miscarriages: 0  Age of delivery of first child: N/A  BREAST FEEDING: N/A   Length of time taking birth control pills: N/A   Patient took hormone replacement during the following dates: 2 YRS   PATIENT STILL HAS UTERUS AND OVARIES.     Past Surgical History:   Procedure Laterality Date   • CARDIAC CATHETERIZATION N/A 3/7/2019    Procedure: Right and Left Heart Cath;  Surgeon: Cam Hand MD;  [facility];  Service: Cardiology   • CARDIAC CATHETERIZATION N/A 3/7/2019    Procedure: Coronary angiography;  Surgeon: Cam Hand MD;  [facility];  Service: Cardiology   • CARDIAC CATHETERIZATION N/A 3/7/2019    Procedure: Left ventriculography;  Surgeon: Cam Hand MD;  Location[facility];  Service: Cardiology   • LAPAROSCOPIC CHOLECYSTECTOMY W/ CHOLANGIOGRAPHY     • SHOULDER SURGERY Left      Past Medical History:   Diagnosis Date   • Arthritis    • CAD (coronary artery disease)    • Cholelithiasis    • Heartburn    • Mitral valve prolapse syndrome    • Myocardial infarction (CMS/HCC)     pt is not aware of history of MI   • Osteoporosis        Prior Hospitalizations, other than for surgery or childbirth, and year:  NONE     Social History     Socioeconomic History   • Marital status:      Spouse name: Not on file   • Number of children: Not on file   • Years of education: Not on file   • Highest education level: Not on file   Occupational History   • Occupation: professor   Tobacco Use   • Smoking status: Former Smoker   •  "Smokeless tobacco: Never Used   • Tobacco comment: caffeine use   Substance and Sexual Activity   • Alcohol use: Yes     Alcohol/week: 1.8 oz     Types: 1 Glasses of wine, 1 Cans of beer, 1 Shots of liquor per week     Comment: weekends-socially   • Drug use: No   • Sexual activity: Defer     Patient is .  Patient is retired.  Patient drinks 1-2 servings of caffeine per day.    Family History:  Family History   Problem Relation Age of Onset   • Brain cancer Mother    • Heart disease Father    • Brain cancer Father    • Stomach cancer Maternal Grandmother    • Cancer Maternal Grandfather    • No Known Problems Paternal Grandmother    • No Known Problems Paternal Grandfather        Vital Signs:  /75 (BP Location: Left arm, Patient Position: Sitting, Cuff Size: Adult)   Pulse 101   Ht 165.1 cm (65\")   Wt 59.9 kg (132 lb)   LMP  (LMP Unknown)   SpO2 97%   Breastfeeding? No   BMI 21.97 kg/m²      Medications:    Current Outpatient Medications:   •  acetaminophen (TYLENOL) 500 MG tablet, Take 500 mg by mouth every 6 (six) hours as needed for mild pain (1-3)., Disp: , Rfl:   •  aspirin 81 MG chewable tablet, Chew 81 mg daily., Disp: , Rfl:   •  b complex-C-folic acid 1 MG capsule, Take 1 capsule by mouth Daily., Disp: , Rfl:   •  Cholecalciferol (VITAMIN D3) 5000 units capsule capsule, Take 5,000 Units by mouth Daily., Disp: , Rfl:   •  meloxicam (MOBIC) 15 MG tablet, 1 PO Daily with food., Disp: 30 tablet, Rfl: 0  •  meloxicam (MOBIC) 15 MG tablet, Take 15 mg by mouth Daily., Disp: , Rfl:   •  metaxalone (SKELAXIN) 800 MG tablet, Take 800 mg by mouth As Needed for Muscle Spasms., Disp: , Rfl:   •  zoledronic acid (RECLAST) 5 MG/100ML solution injection, Infuse 5 mg into a venous catheter 1 (one) time. 1 time yearly, Disp: , Rfl:      Allergies:  Allergies   Allergen Reactions   • No Known Drug Allergy        Physical Examination:  /75 (BP Location: Left arm, Patient Position: Sitting, Cuff Size: " "Adult)   Pulse 101   Ht 165.1 cm (65\")   Wt 59.9 kg (132 lb)   LMP  (LMP Unknown)   SpO2 97%   Breastfeeding? No   BMI 21.97 kg/m²   General Appearance:  Patient is in no distress.  She is well kept and has an thin build.   Psychiatric:  Patient with appropriate mood and affect. Alert and oriented to self, time, and place.    Breast, RIGHT:  small sized, symmetric with the contralateral side.  Breast skin is without erythema, edema, rashes.  There are no visible abnormalities upon inspection during the arm-raising maneuver or with hands on hips in the sitting position. There is no nipple retraction, discharge or nipple/areolar skin changes.There are no masses palpable in the sitting or supine positions.    Breast, LEFT:  small sized, symmetric with the contralateral side.  Breast skin is without erythema, edema, rashes.  There are no visible abnormalities upon inspection during the arm-raising maneuver or with hands on hips in the sitting position. There is no nipple retraction, discharge or nipple/areolar skin changes.There are no masses palpable in the sitting or supine positions.    Lymphatic:  There is no axillary, cervical, infraclavicular, or supraclavicular adenopathy bilaterally.  Eyes:  Pupils are round and reactive to light.  Cardiovascular:  Heart rate and rhythm are regular.  Respiratory:  Lungs are clear bilaterally with no crackles or wheezes in any lung field.  Gastrointestinal:  Abdomen is soft, nondistended, and nontender.     Musculoskeletal:  Good strength in all 4 extremities.   There is good range of motion in both shoulders.    Skin:  No new skin lesions or rashes on the skin excluding the breast (see breast exam above). Numerous seborrheic keratoses on her skin.        Imagin  [facility]   Mammo  Rasheeda Balderas  BILATERAL MAMMOGRAPHY  Scattered areas of fibroglandular density.  BI-RADS CATEGORY: 1, Negative.    2017  [facility]   Mammo  Rasheeda " Sherrie  BILATERAL MAMMOGRAPHY  Scattered areas of fibroglandular density.  BI-RADS CATEGORY: 2, Benign.    09/11/2018  [facility]   Mammo  Rasheeda Balderas  BILATERAL SCREENING MAMMOGRAM WITH TOMOSYNTHESIS  Scattered areas of fibroglandular density.  Finding 1: High density, oval mass measuring 5 millimeters posterior one-third left breast at 12 o’clock located 8 centimeters from the nipple.  Finding 2: Small, ova mass measuring 4 millimeters seen in the posterior one-third 8:30 o’clock region of the right breast located 5 centimeters from the nipple.  IMPRESSION:  BI-RADS CATEGORY: 0    10/09/2018  [facility]   Mammo  Rasheeda Balderas  BILATERAL DIAGNOSTIC MAMMOGRAM WITH TOMOSYNTHESIS  Finding 1: Oval mass in the left breast, 12 o’clock.  Finding 2: Oval mass in the right breast, 8:30 o’clock.  BILATERAL BREAST ULTRASOUND  Finding 1: Oval complicated cyst measuring 8 x 3 x 6 mm.  Finding 2: Oval complicated cyst measuring 5 x 3 x 5 mm.  IMPRESSION:  Finding 1: Probably benign. Follow-up ultrasound exam in 6 months.  Finding 2: Complicated cyst in the right breast is probably benign. Follow-up ultrasound exam in 6 months.  BI-RADS CATEGORY: 3, Probably Benign.    [facility] [place] April 26, 2019 bilateral breast ultrasound:  1.  Follow-up oval mass 12:00.  Stable complicated cyst 8 mm posterior one third left breast 12:00, 8 cm from the nipple.  No significant change.  Follow-up oval mass right breast 830 stable complicated cyst measuring 5 mm posterior one third 830, 5 cm from the nipple no significant change.  BI-RADS 3 recommend ultrasound in 6 months.  Her screening mammogram is due in 6 months.    [facility] October 30, 2019 bilateral screening mammogram with 3D.  There are scattered areas of fibroglandular density.  Follow-up complicated cyst left breast 12:00.  Does not persist.  Bilateral breast ultrasound:  Follow-up complicated cyst right breast 830.  Oval  complicated cyst measuring 5 mm posterior one third 830, 5 cm from the nipple with no change.  Follow-up complicated cyst left breast 12:00 no evidence of any solid or cystic element.  Finding #3 round simple cyst measuring 7 mm at 130 left breast.  Impression complicated cyst in the right breast is probably benign follow-up ultrasound of the right breast in 6 months.  BI-RADS 3    Procedures:      Assessment:   Diagnosis Plan   1. Abnormal ultrasound of breast  US Breast Right Complete   2. Fibrocystic breast disease (FCBD), unspecified laterality  US Breast Right Complete     1-2  Right breast 8:30, 2 cm from the nipple: 4 mm mass on mammogram, 5 mm mass on ultrasound, BI-RADS 3 likely complicated cyst. 4-2019 FU US BR3- BR3 US in - stable 5mm benign features    Left breast 12:00, 2 cm from the nipple 5 mm mass on mammogram, 8 mm mass on ultrasound, BI-RADS 3 likely, located cyst.- 4-2019 FU US BR3- not seen on  imaging- BR2      Plan:    We reviewed her interval history, imaging, imaging reports and examination together today.    Her imaging and exam are in good order.    We reviewed that a BIRADS 3 designation describes an imaging finding that is 97-98% likely to represent a benign process.     I will arrange for her RIGHT breast US at [facility] and to see me back after.    [facility]    Had a MV repair in [place] May 30,2019.    Past tobacco use 1/2-1ppd from age 21 to age 61.    I asked her to continue her self breast exam and to call us in the interim with any concerns would be happy to see her back sooner.    Kathleen Polanco MD          Next Appointment:  Return for Next scheduled follow up, after imaging.      EMR Dragon/transcription disclaimer:    Much of this encounter note is an electronic transcription/translocation of spoken language to printed text.  The electronic translation of spoken language may permit erroneous, or  at times, nonsensical words or phrases to be inadvertently transcribed.  Although I have reviewed the note from such areas, some may still exist.

## 2019-11-12 NOTE — PROGRESS NOTES
Chief Complaint: Rasheeda Balderas is a 68 y.o. female who was seen in consultation at the request of Malini Simeon MD  for breast cysts    History of Present Illness:  Patient presents with abnormal breast imaging. She noted no new masses, skin changes, nipple discharge, nipple changes prior to her most recent imaging.  Her most recent imaging includes the followin2017  [facility]   Mammo  Rasheeda Balderas  BILATERAL MAMMOGRAPHY  Scattered areas of fibroglandular density.  BI-RADS CATEGORY: 2, Benign.    2018  [facility]   Mammo  Rasheeda Balderas  BILATERAL SCREENING MAMMOGRAM WITH TOMOSYNTHESIS  Scattered areas of fibroglandular density.  Finding 1: High density, oval mass measuring 5 millimeters posterior one-third left breast at 12 o’clock located 8 centimeters from the nipple.  Finding 2: Small, ova mass measuring 4 millimeters seen in the posterior one-third 8:30 o’clock region of the right breast located 5 centimeters from the nipple.  IMPRESSION:  BI-RADS CATEGORY: 0    10/09/2018  [facility]   Mammo  Rasheeda Balderas  BILATERAL DIAGNOSTIC MAMMOGRAM WITH TOMOSYNTHESIS  Finding 1: Oval mass in the left breast, 12 o’clock.  Finding 2: Oval mass in the right breast, 8:30 o’clock.  BILATERAL BREAST ULTRASOUND  Finding 1: Oval complicated cyst measuring 8 x 3 x 6 mm.  Finding 2: Oval complicated cyst measuring 5 x 3 x 5 mm.  IMPRESSION:  Finding 1: Probably benign. Follow-up ultrasound exam in 6 months.  Finding 2: Complicated cyst in the right breast is probably benign. Follow-up ultrasound exam in 6 months.  BI-RADS CATEGORY: 3, Probably Benign.        She has not had a breast biopsy in the past.  She has her uterus and ovaries, is postmenopausal, and takes nor hormones. She took HRT for 2 years, remotely.  Her family history includes the following: she has no children,.  One sister, no maternal aunts and 2 paternal aunts.  No history of breast or ovarian cancer in  Ronald Gauthier is a 40year old female who presents for a complete physical exam, no gyn. HPI:         Patient feels well, dental visit up to date, no hearing problem. Vaccinations up to date. Pap smear up to date.   Mammogram screening and Pap excessive bleeding  ENDOCRINE: denies excessive thirst or urination; denies unexpected wt gain or wt loss  ALLERGY/IMM.: denies food or seasonal allergies  PSYCH: no symptoms of depression or anxiety      EXAM:   /84   Pulse 80   Temp 98.7 °F (37.1 ° her family.  She is here for evaluation.    Review of Systems:  Review of Systems   HENT:   Positive for sore throat.    Musculoskeletal: Positive for arthralgias.   All other systems reviewed and are negative.       Past Medical and Surgical History:  Breast Biopsy History:  Patient has not had a breast biopsy in the past.  Breast Cancer HIstory:  Patient does not have a past medical history of breast cancer.  Breast Operations, and year:  NONE   Obstetric/Gynecologic History:  Age menstrual periods began: 13  Patient is postmenopausal, entered menopause naturally at age: LATE 40S   Number of pregnancies: 0  Number of live births: 0  Number of abortions or miscarriages: 0  Age of delivery of first child: N/A  BREAST FEEDING: N/A   Length of time taking birth control pills: N/A   Patient took hormone replacement during the following dates: 2 YRS   PATIENT STILL HAS UTERUS AND OVARIES.     Past Surgical History:   Procedure Laterality Date   • LAPAROSCOPIC CHOLECYSTECTOMY W/ CHOLANGIOGRAPHY     • SHOULDER SURGERY       Past Medical History:   Diagnosis Date   • Arthritis    • CAD (coronary artery disease)    • Cholelithiasis    • Heartburn    • Mitral valve prolapse syndrome    • Myocardial infarction (CMS/HCC)    • Osteoporosis        Prior Hospitalizations, other than for surgery or childbirth, and year:  NONE     Social History     Socioeconomic History   • Marital status:      Spouse name: Not on file   • Number of children: Not on file   • Years of education: Not on file   • Highest education level: Not on file   Social Needs   • Financial resource strain: Not on file   • Food insecurity - worry: Not on file   • Food insecurity - inability: Not on file   • Transportation needs - medical: Not on file   • Transportation needs - non-medical: Not on file   Occupational History   • Occupation: professor   Tobacco Use   • Smoking status: Former Smoker   • Smokeless tobacco: Never Used   • Tobacco comment:  "caffeine use   Substance and Sexual Activity   • Alcohol use: Yes     Comment: weekends-socially   • Drug use: No   • Sexual activity: Defer   Other Topics Concern   • Not on file   Social History Narrative   • Not on file     Patient is .  Patient is retired.  Patient drinks 1-2 servings of caffeine per day.    Family History:  Family History   Problem Relation Age of Onset   • Brain cancer Mother    • Heart disease Father    • Brain cancer Father    • Stomach cancer Maternal Grandmother    • Cancer Maternal Grandfather    • No Known Problems Paternal Grandmother    • No Known Problems Paternal Grandfather        Vital Signs:  /74 (BP Location: Left arm, Patient Position: Sitting, Cuff Size: Adult)   Pulse 69   Temp 98.2 °F (36.8 °C) (Temporal)   Ht 165.1 cm (65\")   Wt 60.8 kg (134 lb)   LMP  (LMP Unknown)   SpO2 97%   Breastfeeding? No   BMI 22.30 kg/m²      Medications:    Current Outpatient Medications:   •  acetaminophen (TYLENOL) 500 MG tablet, Take 500 mg by mouth every 6 (six) hours as needed for mild pain (1-3)., Disp: , Rfl:   •  aspirin 81 MG chewable tablet, Chew 81 mg daily., Disp: , Rfl:   •  b complex-C-folic acid 1 MG capsule, Take 1 capsule by mouth Daily., Disp: , Rfl:   •  Cinnamon 500 MG capsule, Take 500 mg by mouth Daily., Disp: , Rfl:   •  simvastatin (ZOCOR) 20 MG tablet, Take 20 mg by mouth every night., Disp: , Rfl:   •  vitamin D (ERGOCALCIFEROL) 65408 UNITS capsule capsule, Take 50,000 Units by mouth 1 (one) time per week., Disp: , Rfl:   •  zoledronic acid (RECLAST) 5 MG/100ML solution injection, Infuse 5 mg into a venous catheter 1 (one) time. 1 time yearly, Disp: , Rfl:   •  Omega-3 Fatty Acids (FISH OIL) 1000 MG capsule capsule, Take  by mouth daily with breakfast., Disp: , Rfl:      Allergies:  Allergies   Allergen Reactions   • No Known Drug Allergy        Physical Examination:  /74 (BP Location: Left arm, Patient Position: Sitting, Cuff Size: Adult)   " Signed Prescriptions Disp Refills   • ergocalciferol 1.25 MG (05311 UT) Oral Cap 4 capsule 0     Sig: Take 1 capsule (50,000 Units total) by mouth once a week.    Hypothyroid:  Levothyroxine Sodium 150 MCG Oral Tab 90 tablet 4    Sig: Take 1 tablet (150 "Pulse 69   Temp 98.2 °F (36.8 °C) (Temporal)   Ht 165.1 cm (65\")   Wt 60.8 kg (134 lb)   LMP  (LMP Unknown)   SpO2 97%   Breastfeeding? No   BMI 22.30 kg/m²   General Appearance:  Patient is in no distress.  She is well kept and has an thin build.   Psychiatric:  Patient with appropriate mood and affect. Alert and oriented to self, time, and place.    Breast, RIGHT:  small sized, symmetric with the contralateral side.  Breast skin is without erythema, edema, rashes.  There are no visible abnormalities upon inspection during the arm-raising maneuver or with hands on hips in the sitting position. There is no nipple retraction, discharge or nipple/areolar skin changes.There are no masses palpable in the sitting or supine positions.    Breast, LEFT:  small sized, symmetric with the contralateral side.  Breast skin is without erythema, edema, rashes.  There are no visible abnormalities upon inspection during the arm-raising maneuver or with hands on hips in the sitting position. There is no nipple retraction, discharge or nipple/areolar skin changes.There are no masses palpable in the sitting or supine positions.    Lymphatic:  There is no axillary, cervical, infraclavicular, or supraclavicular adenopathy bilaterally.  Eyes:  Pupils are round and reactive to light.  Cardiovascular:  Heart rate and rhythm are regular.  Respiratory:  Lungs are clear bilaterally with no crackles or wheezes in any lung field.  Gastrointestinal:  Abdomen is soft, nondistended, and nontender.     Musculoskeletal:  Good strength in all 4 extremities.   There is good range of motion in both shoulders.    Skin:  No new skin lesions or rashes on the skin excluding the breast (see breast exam above).        Imagin  [facility]   Mammo  Rasheeda Sherrie  BILATERAL MAMMOGRAPHY  Scattered areas of fibroglandular density.  BI-RADS CATEGORY: 1, Negative.    2017  [facility]   Mammo  Rasheeda Sherrie  BILATERAL " MAMMOGRAPHY  Scattered areas of fibroglandular density.  BI-RADS CATEGORY: 2, Benign.    09/11/2018  [facility]   Mammo  Rasheeda Balderas  BILATERAL SCREENING MAMMOGRAM WITH TOMOSYNTHESIS  Scattered areas of fibroglandular density.  Finding 1: High density, oval mass measuring 5 millimeters posterior one-third left breast at 12 o’clock located 8 centimeters from the nipple.  Finding 2: Small, ova mass measuring 4 millimeters seen in the posterior one-third 8:30 o’clock region of the right breast located 5 centimeters from the nipple.  IMPRESSION:  BI-RADS CATEGORY: 0    10/09/2018  [facility]   Mammo  Rasheeda Balderas  BILATERAL DIAGNOSTIC MAMMOGRAM WITH TOMOSYNTHESIS  Finding 1: Oval mass in the left breast, 12 o’clock.  Finding 2: Oval mass in the right breast, 8:30 o’clock.  BILATERAL BREAST ULTRASOUND  Finding 1: Oval complicated cyst measuring 8 x 3 x 6 mm.  Finding 2: Oval complicated cyst measuring 5 x 3 x 5 mm.  IMPRESSION:  Finding 1: Probably benign. Follow-up ultrasound exam in 6 months.  Finding 2: Complicated cyst in the right breast is probably benign. Follow-up ultrasound exam in 6 months.  BI-RADS CATEGORY: 3, Probably Benign.      Procedures:      Assessment:   Diagnosis Plan   1. Abnormal ultrasound of breast  US Breast Bilateral Limited   2. Abnormal mammogram  US Breast Bilateral Limited     1-2  Right breast 8:30, 2 cm from the nipple: 4 mm mass on mammogram, 5 mm mass on ultrasound, BI-RADS 3 likely complicated cyst.  Left breast 12:00, 2 cm from the nipple 5 mm mass on mammogram, 8 mm mass on ultrasound, BI-RADS 3 likely, located cyst.      Plan:  We reviewed her history, imaging, imaging reports and examination together today.  We discussed that screening mammograms are used to detect changes in imaging from the prior year or to detect findings at baseline. If there are changes seen, this study is followed by diagnostic imaging, often a diagnostic mammogram with or without  targeted breast ultrasound. We discussed that a BIRADS 3 designation describes an imaging finding that is 97-98% likely to represent a benign process. We discussed that the most common management of a BIRADS 3 finding is 6 month imaging surveillance with examination. We discussed that the alternate management option is to biopsy the lesion, if the concern and anxiety over the imaging was not acceptable to the patient. She understood the above, and wished to proceed with imaging surveillance in 6 months with an exam thereafter.    We also discussed the benefit of using 3-D mammography for screening, including 30% improved detection of her digital and a reduced callback.  [facility] and see her back after.  [facility] bilateral [facility] will be due September 12, 2019.  I asked her to continue her self breast exam and to call us in the interim with any concerns or changes and we be happy to see her back sooner.  I asked her to continue her SBE  monthly and to let us know if she has any changes or concerns in the interim.    Note has MVP, followed by Dr Prado- may need heart surgery in the future due to enlargement of heart. She is asymptomatic prsently.  Past tobacco use 1/2-1ppd from age 21 to age 61.      Kathleen Polanco MD          Next Appointment:  Return for Next scheduled follow up, after imaging.      EMR Dragon/transcription disclaimer:    Much of this encounter note is an electronic transcription/translocation of spoken language to printed text.  The electronic translation of spoken language may permit erroneous, or at times, nonsensical words or phrases to be inadvertently transcribed.  Although I have reviewed the note from such areas, some may still exist.

## 2019-11-14 ENCOUNTER — TELEPHONE (OUTPATIENT)
Dept: SURGERY | Facility: CLINIC | Age: 69
End: 2019-11-14

## 2019-11-14 NOTE — TELEPHONE ENCOUNTER
Scheduled Rt Breast U/S [facility] 6-8-20 @ 2:00    Return to see  6-19-20 arrive 11:45    Lm on pt's phone/  kdl

## 2020-02-11 ENCOUNTER — TRANSCRIBE ORDERS (OUTPATIENT)
Dept: ADMINISTRATIVE | Facility: HOSPITAL | Age: 70
End: 2020-02-11

## 2020-02-11 DIAGNOSIS — M81.0 SENILE OSTEOPOROSIS: Primary | ICD-10-CM

## 2020-03-11 RX ORDER — ZOLEDRONIC ACID 5 MG/100ML
5 INJECTION, SOLUTION INTRAVENOUS ONCE
Status: CANCELLED | OUTPATIENT
Start: 2020-03-20

## 2020-03-20 ENCOUNTER — HOSPITAL ENCOUNTER (OUTPATIENT)
Dept: INFUSION THERAPY | Facility: HOSPITAL | Age: 70
Discharge: HOME | End: 2020-03-20
Admitting: INTERNAL MEDICINE

## 2020-03-20 VITALS
HEART RATE: 53 BPM | TEMPERATURE: 96.5 F | WEIGHT: 131.3 LBS | RESPIRATION RATE: 16 BRPM | OXYGEN SATURATION: 95 % | BODY MASS INDEX: 21.85 KG/M2 | SYSTOLIC BLOOD PRESSURE: 68 MMHG | DIASTOLIC BLOOD PRESSURE: 55 MMHG

## 2020-03-20 DIAGNOSIS — M81.0 AGE RELATED OSTEOPOROSIS, UNSPECIFIED PATHOLOGICAL FRACTURE PRESENCE: Primary | ICD-10-CM

## 2020-03-20 PROCEDURE — 96365 THER/PROPH/DIAG IV INF INIT: CPT

## 2020-03-20 PROCEDURE — 25010000002 ZOLEDRONIC ACID 5 MG/100ML SOLUTION: Performed by: INTERNAL MEDICINE

## 2020-03-20 RX ORDER — ZOLEDRONIC ACID 5 MG/100ML
5 INJECTION, SOLUTION INTRAVENOUS ONCE
Status: CANCELLED | OUTPATIENT
Start: 2020-03-20

## 2020-03-20 RX ORDER — ZOLEDRONIC ACID 5 MG/100ML
5 INJECTION, SOLUTION INTRAVENOUS ONCE
Status: COMPLETED | OUTPATIENT
Start: 2020-03-20 | End: 2020-03-20

## 2020-03-20 RX ADMIN — ZOLEDRONIC ACID 5 MG: 5 INJECTION, SOLUTION INTRAVENOUS at 10:29

## 2020-06-08 ENCOUNTER — APPOINTMENT (OUTPATIENT)
Dept: WOMENS IMAGING | Facility: HOSPITAL | Age: 70
End: 2020-06-08

## 2020-06-08 PROCEDURE — 76641 ULTRASOUND BREAST COMPLETE: CPT | Performed by: RADIOLOGY

## 2020-06-12 ENCOUNTER — TELEPHONE (OUTPATIENT)
Dept: SURGERY | Facility: CLINIC | Age: 70
End: 2020-06-12

## 2020-06-12 NOTE — TELEPHONE ENCOUNTER
[facility] June [facility], 2020 right breast ultrasound.  Right breast 830 is an oval simple cyst 6 mm posterior one third 830 right breast, 5 cm from the nipple BI-RADS 2

## 2020-06-19 ENCOUNTER — OFFICE VISIT (OUTPATIENT)
Dept: SURGERY | Facility: CLINIC | Age: 70
End: 2020-06-19

## 2020-06-19 VITALS
SYSTOLIC BLOOD PRESSURE: 100 MMHG | BODY MASS INDEX: 21.49 KG/M2 | OXYGEN SATURATION: 99 % | WEIGHT: 129 LBS | HEIGHT: 65 IN | HEART RATE: 85 BPM | DIASTOLIC BLOOD PRESSURE: 61 MMHG

## 2020-06-19 DIAGNOSIS — N60.19 FIBROCYSTIC BREAST DISEASE (FCBD), UNSPECIFIED LATERALITY: ICD-10-CM

## 2020-06-19 DIAGNOSIS — R92.8 ABNORMAL ULTRASOUND OF BREAST: Primary | ICD-10-CM

## 2020-06-19 PROCEDURE — 99212 OFFICE O/P EST SF 10 MIN: CPT | Performed by: SURGERY

## 2020-06-19 NOTE — PROGRESS NOTES
Chief Complaint: Rasheeda Balderas is a 69 y.o. female who was seen in consultation at the request of Malini Simeon MD  for breast cysts and a followup visit    History of Present Illness:  Patient presents with abnormal breast imaging. She noted no new masses, skin changes, nipple discharge, nipple changes prior to her most recent imaging.  Her most recent imaging includes the followin2017  [facility]   Mammo  Rasheeda Balderas  BILATERAL MAMMOGRAPHY  Scattered areas of fibroglandular density.  BI-RADS CATEGORY: 2, Benign.    2018  [facility]   Mammo  Rasheeda Balderas  BILATERAL SCREENING MAMMOGRAM WITH TOMOSYNTHESIS  Scattered areas of fibroglandular density.  Finding 1: High density, oval mass measuring 5 millimeters posterior one-third left breast at 12 o’clock located 8 centimeters from the nipple.  Finding 2: Small, ova mass measuring 4 millimeters seen in the posterior one-third 8:30 o’clock region of the right breast located 5 centimeters from the nipple.  IMPRESSION:  BI-RADS CATEGORY: 0    10/09/2018  [facility]   Mammo  Rasheeda Balderas  BILATERAL DIAGNOSTIC MAMMOGRAM WITH TOMOSYNTHESIS  Finding 1: Oval mass in the left breast, 12 o’clock.  Finding 2: Oval mass in the right breast, 8:30 o’clock.  BILATERAL BREAST ULTRASOUND  Finding 1: Oval complicated cyst measuring 8 x 3 x 6 mm.  Finding 2: Oval complicated cyst measuring 5 x 3 x 5 mm.  IMPRESSION:  Finding 1: Probably benign. Follow-up ultrasound exam in 6 months.  Finding 2: Complicated cyst in the right breast is probably benign. Follow-up ultrasound exam in 6 months.  BI-RADS CATEGORY: 3, Probably Benign.    She has not had a breast biopsy in the past.  She has her uterus and ovaries, is postmenopausal, and takes nor hormones. She took HRT for 2 years, remotely.  Her family history includes the following: she has no children,.  One sister, no maternal aunts and 2 paternal aunts.  No history of breast  or ovarian cancer in her family.      In the interim,  Rasheeda Balderas  has done well.  She has noted no changes in her breast exam. No new masses, skin changes, nipple changes, nipple discharge either breast.     Her most recent imaging includes the following:    [facility] April 26, 2019 bilateral breast ultrasound:  1.  Follow-up oval mass 12:00.  Stable complicated cyst 8 mm posterior one third left breast 12:00, 8 cm from the nipple.  No significant change.  Follow-up oval mass right breast 830 stable complicated cyst measuring 5 mm posterior one third 830, 5 cm from the nipple no significant change.  BI-RADS 3 recommend ultrasound in 6 months.  Her screening mammogram is due in 6 months.    In the interim,  Rasheeda Balderas  has done well.  She has noted no changes in her breast exam. No new masses, skin changes, nipple changes, nipple discharge either breast.       Her most recent imaging includes the following:  [facility] October 30, 2019 bilateral screening mammogram with 3D.  There are scattered areas of fibroglandular density.  Follow-up complicated cyst left breast 12:00.  Does not persist.  Bilateral breast ultrasound:  Follow-up complicated cyst right breast 830.  Oval complicated cyst measuring 5 mm posterior one third 830, 5 cm from the nipple with no change.  Follow-up complicated cyst left breast 12:00 no evidence of any solid or cystic element.  Finding #3 round simple cyst measuring 7 mm at 130 left breast.  Impression complicated cyst in the right breast is probably benign follow-up ultrasound of the right breast in 6 months.  BI-RADS 3    Interval History:  In the interim,  Rasheeda Balderas  has done well.  She has noted no changes in her breast exam. No new masses, skin changes, nipple changes, nipple discharge either breast.     Her most recent imaging includes the following:  [facility] June 8, 2020 right breast ultrasound.  Right breast 830 is an  oval simple cyst 6 mm posterior one third 830 right breast, 5 cm from the nipple BI-RADS 2    Review of Systems:  Review of Systems   Constitutional: Negative for unexpected weight change (3 lb wt loss).   HENT:   Positive for sore throat.    Musculoskeletal: Positive for arthralgias.   All other systems reviewed and are negative.       Past Medical and Surgical History:  Breast Biopsy History:  Patient has not had a breast biopsy in the past.  Breast Cancer HIstory:  Patient does not have a past medical history of breast cancer.  Breast Operations, and year:  NONE   Obstetric/Gynecologic History:  Age menstrual periods began: 13  Patient is postmenopausal, entered menopause naturally at age: LATE 40S   Number of pregnancies: 0  Number of live births: 0  Number of abortions or miscarriages: 0  Age of delivery of first child: N/A  BREAST FEEDING: N/A   Length of time taking birth control pills: N/A   Patient took hormone replacement during the following dates: 2 YRS   PATIENT STILL HAS UTERUS AND OVARIES.     Past Surgical History:   Procedure Laterality Date   • CARDIAC CATHETERIZATION N/A 3/7/2019    Procedure: Right and Left Heart Cath;  Surgeon: Cam Hand MD;  Location: [facility] INVASIVE LOCATION;  Service: Cardiology   • CARDIAC CATHETERIZATION N/A 3/7/2019    Procedure: Coronary angiography;  Surgeon: Cam Hand MD;  Location: [facility] CATH INVASIVE LOCATION;  Service: Cardiology   • CARDIAC CATHETERIZATION N/A 3/7/2019    Procedure: Left ventriculography;  Surgeon: Cam Hand MD;  Location: [facility] CATH INVASIVE LOCATION;  Service: Cardiology   • LAPAROSCOPIC CHOLECYSTECTOMY W/ CHOLANGIOGRAPHY     • SHOULDER SURGERY Left      Past Medical History:   Diagnosis Date   • Arthritis    • CAD (coronary artery disease)    • Cholelithiasis    • Heartburn    • Mitral valve prolapse syndrome    • Myocardial infarction (CMS/HCC)     pt is not aware of history of MI   • Osteoporosis        Prior  "Hospitalizations, other than for surgery or childbirth, and year:  NONE     Social History     Socioeconomic History   • Marital status:      Spouse name: Not on file   • Number of children: Not on file   • Years of education: Not on file   • Highest education level: Not on file   Occupational History   • Occupation: professor   Tobacco Use   • Smoking status: Former Smoker   • Smokeless tobacco: Never Used   • Tobacco comment: caffeine use   Substance and Sexual Activity   • Alcohol use: Yes     Alcohol/week: 3.0 standard drinks     Types: 1 Glasses of wine, 1 Cans of beer, 1 Shots of liquor per week     Comment: weekends-socially   • Drug use: No   • Sexual activity: Defer     Patient is .  Patient is retired.  Patient drinks 1-2 servings of caffeine per day.    Family History:  Family History   Problem Relation Age of Onset   • Brain cancer Mother    • Heart disease Father    • Brain cancer Father    • Stomach cancer Maternal Grandmother    • Cancer Maternal Grandfather    • No Known Problems Paternal Grandmother    • No Known Problems Paternal Grandfather        Vital Signs:  /61   Pulse 85   Ht 165.1 cm (65\")   Wt 58.5 kg (129 lb)   LMP  (LMP Unknown)   SpO2 99%   Breastfeeding No   BMI 21.47 kg/m²      Medications:    Current Outpatient Medications:   •  acetaminophen (TYLENOL) 500 MG tablet, Take 500 mg by mouth every 6 (six) hours as needed for mild pain (1-3)., Disp: , Rfl:   •  aspirin 81 MG chewable tablet, Chew 81 mg daily., Disp: , Rfl:   •  b complex-C-folic acid 1 MG capsule, Take 1 capsule by mouth Daily., Disp: , Rfl:   •  metaxalone (SKELAXIN) 800 MG tablet, Take 800 mg by mouth As Needed for Muscle Spasms., Disp: , Rfl:   •  zoledronic acid (RECLAST) 5 MG/100ML solution injection, Infuse 5 mg into a venous catheter 1 (one) time. 1 time yearly, Disp: , Rfl:   •  Cholecalciferol (VITAMIN D3) 5000 units capsule capsule, Take 5,000 Units by mouth Daily., Disp: , Rfl:   •  " "meloxicam (MOBIC) 15 MG tablet, 1 PO Daily with food., Disp: 30 tablet, Rfl: 0  •  meloxicam (MOBIC) 15 MG tablet, Take 15 mg by mouth Daily., Disp: , Rfl:      Allergies:  Allergies   Allergen Reactions   • No Known Drug Allergy        Physical Examination:  /61   Pulse 85   Ht 165.1 cm (65\")   Wt 58.5 kg (129 lb)   LMP  (LMP Unknown)   SpO2 99%   Breastfeeding No   BMI 21.47 kg/m²   General Appearance:  Patient is in no distress.  She is well kept and has an thin build.   Psychiatric:  Patient with appropriate mood and affect. Alert and oriented to self, time, and place.    Breast, RIGHT:  small sized, symmetric with the contralateral side.  Breast skin is without erythema, edema, rashes.  There are no visible abnormalities upon inspection during the arm-raising maneuver or with hands on hips in the sitting position. There is no nipple retraction, discharge or nipple/areolar skin changes.There are no masses palpable in the sitting or supine positions.    Breast, LEFT:  small sized, symmetric with the contralateral side.  Breast skin is without erythema, edema, rashes.  There are no visible abnormalities upon inspection during the arm-raising maneuver or with hands on hips in the sitting position. There is no nipple retraction, discharge or nipple/areolar skin changes.There are no masses palpable in the sitting or supine positions.    Lymphatic:  There is no axillary, cervical, infraclavicular, or supraclavicular adenopathy bilaterally.  Eyes:  Pupils are round and reactive to light.  Cardiovascular:  Heart rate and rhythm are regular.  Respiratory:  Lungs are clear bilaterally with no crackles or wheezes in any lung field.  Gastrointestinal:  Abdomen is soft, nondistended, and nontender.     Musculoskeletal:  Good strength in all 4 extremities.   There is good range of motion in both shoulders.    Skin:  No new skin lesions or rashes on the skin excluding the breast (see breast exam above). Numerous " seborrheic keratoses on her skin. LEFT anterior axillary line recently treated seborrheic keratosis seen.        Imagin  [facility]   Mammo  Rasheeda Balderas  BILATERAL MAMMOGRAPHY  Scattered areas of fibroglandular density.  BI-RADS CATEGORY: 1, Negative.    2017  [facility]   Mammo  Rasheeda Sherrie  BILATERAL MAMMOGRAPHY  Scattered areas of fibroglandular density.  BI-RADS CATEGORY: 2, Benign.    2018  [facility]   Mammo  Rasheeda Sherrie  BILATERAL SCREENING MAMMOGRAM WITH TOMOSYNTHESIS  Scattered areas of fibroglandular density.  Finding 1: High density, oval mass measuring 5 millimeters posterior one-third left breast at 12 o’clock located 8 centimeters from the nipple.  Finding 2: Small, ova mass measuring 4 millimeters seen in the posterior one-third 8:30 o’clock region of the right breast located 5 centimeters from the nipple.  IMPRESSION:  BI-RADS CATEGORY: 0    10/09/2018  [facility]   Mammo  Rasheeda Balderas  BILATERAL DIAGNOSTIC MAMMOGRAM WITH TOMOSYNTHESIS  Finding 1: Oval mass in the left breast, 12 o’clock.  Finding 2: Oval mass in the right breast, 8:30 o’clock.  BILATERAL BREAST ULTRASOUND  Finding 1: Oval complicated cyst measuring 8 x 3 x 6 mm.  Finding 2: Oval complicated cyst measuring 5 x 3 x 5 mm.  IMPRESSION:  Finding 1: Probably benign. Follow-up ultrasound exam in 6 months.  Finding 2: Complicated cyst in the right breast is probably benign. Follow-up ultrasound exam in 6 months.  BI-RADS CATEGORY: 3, Probably Benign.    [facility] bilateral [facility]:  1.  Follow-up oval mass 12:00.  Stable complicated cyst 8 mm posterior one third left breast 12:00, 8 cm from the nipple.  No significant change.  Follow-up oval mass right breast 830 stable complicated cyst measuring 5 mm posterior one third 830, 5 cm from the nipple no significant change.  BI-RADS 3 recommend ultrasound in 6 months.  Her  screening mammogram is due in 6 months.    [facility] October 30, 2019 bilateral screening mammogram with 3D.  There are scattered areas of fibroglandular density.  Follow-up complicated cyst left breast 12:00.  Does not persist.  Bilateral breast ultrasound:  Follow-up complicated cyst right breast 830.  Oval complicated cyst measuring 5 mm posterior one third 830, 5 cm from the nipple with no change.  Follow-up complicated cyst left breast 12:00 no evidence of any solid or cystic element.  Finding #3 round simple cyst measuring 7 mm at 130 left breast.  Impression complicated cyst in the right breast is probably benign follow-up ultrasound of the right breast in 6 months.  BI-RADS 3    [facility] June 8, 2020 right breast ultrasound.  Right breast 830 is an oval simple cyst 6 mm posterior one third 830 right breast, 5 cm from the nipple BI-RADS 2    Procedures:      Assessment:   Diagnosis Plan   1. Abnormal ultrasound of breast     2. Fibrocystic breast disease (FCBD), unspecified laterality       1-2  Right breast 8:30, 2 cm from the nipple: 4 mm mass on mammogram, 5 mm mass on ultrasound, BI-RADS 3 likely complicated cyst. 4-2019 FU US BR3- BR3 US in - stable 5mm benign features    Left breast 12:00, 2 cm from the nipple 5 mm mass on mammogram, 8 mm mass on ultrasound, BI-RADS 3 likely, located cyst.- 4-2019 FU US BR3- not seen on  imaging- BR2    June 8, 2020 5 mm cyst stable.  BI-RADS 2    Plan:    We reviewed her interval history, imaging, imaging reports and examination together today.    Her imaging and exam are in good order.    We reviewed that the lesion of interest has been stable and is benign on both imaging and by exam.  At this juncture she can return to routine imaging.  Her next screening mammogram will be due October 31, 2020 at [facility] [place] [place].  I gave her this reminder today.  I have not given her a routine follow-up in our office.  I asked  her to continue her self breast exam and to call us in the future with any concerns would be happy to see her back.        Had a MV repair in [place] May 30,2019.    Past tobacco use 1/2-1ppd from age 21 to age 61.      Kathleen Polanco MD          Next Appointment:  Return for any future concerns.      EMR Dragon/transcription disclaimer:    Much of this encounter note is an electronic transcription/translocation of spoken language to printed text.  The electronic translation of spoken language may permit erroneous, or at times, nonsensical words or phrases to be inadvertently transcribed.  Although I have reviewed the note from such areas, some may still exist.

## 2020-07-24 ENCOUNTER — HOSPITAL ENCOUNTER (OUTPATIENT)
Dept: GENERAL RADIOLOGY | Facility: HOSPITAL | Age: 70
Discharge: HOME | End: 2020-07-24
Admitting: NURSE PRACTITIONER

## 2020-07-24 ENCOUNTER — OFFICE VISIT (OUTPATIENT)
Dept: CARDIOLOGY | Facility: CLINIC | Age: 70
End: 2020-07-24

## 2020-07-24 ENCOUNTER — LAB (OUTPATIENT)
Dept: LAB | Facility: HOSPITAL | Age: 70
End: 2020-07-24

## 2020-07-24 VITALS
HEART RATE: 73 BPM | OXYGEN SATURATION: 97 % | HEIGHT: 65 IN | WEIGHT: 127 LBS | DIASTOLIC BLOOD PRESSURE: 68 MMHG | SYSTOLIC BLOOD PRESSURE: 124 MMHG | BODY MASS INDEX: 21.16 KG/M2 | RESPIRATION RATE: 18 BRPM

## 2020-07-24 DIAGNOSIS — R06.02 SHORTNESS OF BREATH: ICD-10-CM

## 2020-07-24 DIAGNOSIS — Z98.890 S/P MVR (MITRAL VALVE REPAIR): Primary | ICD-10-CM

## 2020-07-24 DIAGNOSIS — Z98.890 S/P MVR (MITRAL VALVE REPAIR): ICD-10-CM

## 2020-07-24 LAB
ALBUMIN SERPL-MCNC: 4.3 G/DL (ref 3.5–5.2)
ALBUMIN/GLOB SERPL: 1.6 G/DL
ALP SERPL-CCNC: 49 U/L (ref 39–117)
ALT SERPL W P-5'-P-CCNC: 17 U/L (ref 1–33)
ANION GAP SERPL CALCULATED.3IONS-SCNC: 8.2 MMOL/L (ref 5–15)
AST SERPL-CCNC: 20 U/L (ref 1–32)
BILIRUB SERPL-MCNC: 0.6 MG/DL (ref 0–1.2)
BUN SERPL-MCNC: 15 MG/DL (ref 8–23)
BUN/CREAT SERPL: 20.3 (ref 7–25)
CALCIUM SPEC-SCNC: 9.8 MG/DL (ref 8.6–10.5)
CHLORIDE SERPL-SCNC: 102 MMOL/L (ref 98–107)
CO2 SERPL-SCNC: 24.8 MMOL/L (ref 22–29)
CREAT SERPL-MCNC: 0.74 MG/DL (ref 0.57–1)
DEPRECATED RDW RBC AUTO: 45 FL (ref 37–54)
ERYTHROCYTE [DISTWIDTH] IN BLOOD BY AUTOMATED COUNT: 12.2 % (ref 12.3–15.4)
GFR SERPL CREATININE-BSD FRML MDRD: 78 ML/MIN/1.73
GLOBULIN UR ELPH-MCNC: 2.7 GM/DL
GLUCOSE SERPL-MCNC: 102 MG/DL (ref 65–99)
HCT VFR BLD AUTO: 39.4 % (ref 34–46.6)
HGB BLD-MCNC: 13.1 G/DL (ref 12–15.9)
MCH RBC QN AUTO: 32.9 PG (ref 26.6–33)
MCHC RBC AUTO-ENTMCNC: 33.2 G/DL (ref 31.5–35.7)
MCV RBC AUTO: 99 FL (ref 79–97)
NT-PROBNP SERPL-MCNC: 993.6 PG/ML (ref 0–900)
PLATELET # BLD AUTO: 187 10*3/MM3 (ref 140–450)
PMV BLD AUTO: 11.2 FL (ref 6–12)
POTASSIUM SERPL-SCNC: 4.7 MMOL/L (ref 3.5–5.2)
PROT SERPL-MCNC: 7 G/DL (ref 6–8.5)
RBC # BLD AUTO: 3.98 10*6/MM3 (ref 3.77–5.28)
SODIUM SERPL-SCNC: 135 MMOL/L (ref 136–145)
TSH SERPL DL<=0.05 MIU/L-ACNC: 1.3 UIU/ML (ref 0.27–4.2)
WBC # BLD AUTO: 6.79 10*3/MM3 (ref 3.4–10.8)

## 2020-07-24 PROCEDURE — 80053 COMPREHEN METABOLIC PANEL: CPT

## 2020-07-24 PROCEDURE — 93000 ELECTROCARDIOGRAM COMPLETE: CPT | Performed by: NURSE PRACTITIONER

## 2020-07-24 PROCEDURE — 85027 COMPLETE CBC AUTOMATED: CPT

## 2020-07-24 PROCEDURE — 83880 ASSAY OF NATRIURETIC PEPTIDE: CPT

## 2020-07-24 PROCEDURE — 36415 COLL VENOUS BLD VENIPUNCTURE: CPT

## 2020-07-24 PROCEDURE — 99214 OFFICE O/P EST MOD 30 MIN: CPT | Performed by: NURSE PRACTITIONER

## 2020-07-24 PROCEDURE — 84443 ASSAY THYROID STIM HORMONE: CPT

## 2020-07-24 PROCEDURE — 71046 X-RAY EXAM CHEST 2 VIEWS: CPT

## 2020-07-24 RX ORDER — CHOLECALCIFEROL (VITAMIN D3) 125 MCG
5 CAPSULE ORAL NIGHTLY
COMMUNITY
End: 2020-08-24

## 2020-07-24 RX ORDER — LORATADINE 10 MG/1
10 TABLET ORAL DAILY
COMMUNITY

## 2020-07-27 ENCOUNTER — TELEPHONE (OUTPATIENT)
Dept: CARDIOLOGY | Facility: CLINIC | Age: 70
End: 2020-07-27

## 2020-07-27 DIAGNOSIS — R06.02 SHORTNESS OF BREATH: Primary | ICD-10-CM

## 2020-07-27 RX ORDER — POTASSIUM CHLORIDE 750 MG/1
20 TABLET, FILM COATED, EXTENDED RELEASE ORAL DAILY
Qty: 30 TABLET | Refills: 3 | Status: SHIPPED | OUTPATIENT
Start: 2020-07-27 | End: 2020-08-04

## 2020-07-27 RX ORDER — FUROSEMIDE 40 MG/1
40 TABLET ORAL DAILY
Qty: 30 TABLET | Refills: 11 | Status: SHIPPED | OUTPATIENT
Start: 2020-07-27 | End: 2020-08-04

## 2020-07-27 NOTE — TELEPHONE ENCOUNTER
I spoke with her regarding her lab results and chest x-ray.  Her proBNP is slightly elevated.  Otherwise, her labs are unremarkable.  I am going to start her on Lasix and potassium and recheck a BMP next week.  She is also scheduled for an echocardiogram next week.  Further recommendations will be made pending these results.

## 2020-08-03 ENCOUNTER — HOSPITAL ENCOUNTER (OUTPATIENT)
Dept: CARDIOLOGY | Facility: HOSPITAL | Age: 70
Discharge: HOME | End: 2020-08-03
Admitting: NURSE PRACTITIONER

## 2020-08-03 ENCOUNTER — LAB (OUTPATIENT)
Dept: LAB | Facility: HOSPITAL | Age: 70
End: 2020-08-03

## 2020-08-03 VITALS
WEIGHT: 127 LBS | BODY MASS INDEX: 21.16 KG/M2 | SYSTOLIC BLOOD PRESSURE: 124 MMHG | DIASTOLIC BLOOD PRESSURE: 68 MMHG | HEIGHT: 65 IN

## 2020-08-03 DIAGNOSIS — R06.02 SHORTNESS OF BREATH: ICD-10-CM

## 2020-08-03 LAB
ANION GAP SERPL CALCULATED.3IONS-SCNC: 11.4 MMOL/L (ref 5–15)
BUN SERPL-MCNC: 12 MG/DL (ref 8–23)
BUN/CREAT SERPL: 15 (ref 7–25)
CALCIUM SPEC-SCNC: 9.7 MG/DL (ref 8.6–10.5)
CHLORIDE SERPL-SCNC: 89 MMOL/L (ref 98–107)
CO2 SERPL-SCNC: 26.6 MMOL/L (ref 22–29)
CREAT SERPL-MCNC: 0.8 MG/DL (ref 0.57–1)
GFR SERPL CREATININE-BSD FRML MDRD: 71 ML/MIN/1.73
GLUCOSE SERPL-MCNC: 117 MG/DL (ref 65–99)
POTASSIUM SERPL-SCNC: 4.5 MMOL/L (ref 3.5–5.2)
SODIUM SERPL-SCNC: 127 MMOL/L (ref 136–145)

## 2020-08-03 PROCEDURE — 80048 BASIC METABOLIC PNL TOTAL CA: CPT

## 2020-08-03 PROCEDURE — 93306 TTE W/DOPPLER COMPLETE: CPT

## 2020-08-03 PROCEDURE — 93306 TTE W/DOPPLER COMPLETE: CPT | Performed by: INTERNAL MEDICINE

## 2020-08-03 PROCEDURE — 36415 COLL VENOUS BLD VENIPUNCTURE: CPT

## 2020-08-04 ENCOUNTER — TELEPHONE (OUTPATIENT)
Dept: CARDIOLOGY | Facility: CLINIC | Age: 70
End: 2020-08-04

## 2020-08-04 DIAGNOSIS — R06.09 DYSPNEA ON EXERTION: ICD-10-CM

## 2020-08-04 DIAGNOSIS — I42.0 CARDIOMYOPATHY, DILATED (HCC): Primary | ICD-10-CM

## 2020-08-04 LAB
AORTIC ARCH: 2.1 CM
ASCENDING AORTA: 3.2 CM
BH CV ECHO MEAS - ACS: 2.1 CM
BH CV ECHO MEAS - AO MAX PG (FULL): 3.9 MMHG
BH CV ECHO MEAS - AO MAX PG: 6.6 MMHG
BH CV ECHO MEAS - AO MEAN PG (FULL): 2.3 MMHG
BH CV ECHO MEAS - AO MEAN PG: 3.7 MMHG
BH CV ECHO MEAS - AO V2 MAX: 128.1 CM/SEC
BH CV ECHO MEAS - AO V2 MEAN: 87.3 CM/SEC
BH CV ECHO MEAS - AO V2 VTI: 26.1 CM
BH CV ECHO MEAS - ASC AORTA: 3.2 CM
BH CV ECHO MEAS - AVA(I,A): 1.7 CM^2
BH CV ECHO MEAS - AVA(I,D): 1.7 CM^2
BH CV ECHO MEAS - AVA(V,A): 1.7 CM^2
BH CV ECHO MEAS - AVA(V,D): 1.7 CM^2
BH CV ECHO MEAS - BSA(HAYCOCK): 1.6 M^2
BH CV ECHO MEAS - BSA: 1.6 M^2
BH CV ECHO MEAS - BZI_BMI: 21.1 KILOGRAMS/M^2
BH CV ECHO MEAS - BZI_METRIC_HEIGHT: 165.1 CM
BH CV ECHO MEAS - BZI_METRIC_WEIGHT: 57.6 KG
BH CV ECHO MEAS - EDV(MOD-SP2): 69 ML
BH CV ECHO MEAS - EDV(MOD-SP4): 57 ML
BH CV ECHO MEAS - EDV(TEICH): 96.9 ML
BH CV ECHO MEAS - EF(CUBED): 52.2 %
BH CV ECHO MEAS - EF(MOD-BP): 48.7 %
BH CV ECHO MEAS - EF(MOD-SP2): 49.3 %
BH CV ECHO MEAS - EF(MOD-SP4): 52.6 %
BH CV ECHO MEAS - EF(TEICH): 44.2 %
BH CV ECHO MEAS - ESV(MOD-SP2): 35 ML
BH CV ECHO MEAS - ESV(MOD-SP4): 27 ML
BH CV ECHO MEAS - ESV(TEICH): 54.1 ML
BH CV ECHO MEAS - FS: 21.8 %
BH CV ECHO MEAS - IVS/LVPW: 0.98
BH CV ECHO MEAS - IVSD: 1 CM
BH CV ECHO MEAS - LV DIASTOLIC VOL/BSA (35-75): 34.9 ML/M^2
BH CV ECHO MEAS - LV MASS(C)D: 170.3 GRAMS
BH CV ECHO MEAS - LV MASS(C)DI: 104.4 GRAMS/M^2
BH CV ECHO MEAS - LV MAX PG: 2.7 MMHG
BH CV ECHO MEAS - LV MEAN PG: 1.4 MMHG
BH CV ECHO MEAS - LV SYSTOLIC VOL/BSA (12-30): 16.6 ML/M^2
BH CV ECHO MEAS - LV V1 MAX: 81.9 CM/SEC
BH CV ECHO MEAS - LV V1 MEAN: 51.7 CM/SEC
BH CV ECHO MEAS - LV V1 VTI: 16.9 CM
BH CV ECHO MEAS - LVIDD: 4.6 CM
BH CV ECHO MEAS - LVIDS: 3.6 CM
BH CV ECHO MEAS - LVLD AP2: 6.3 CM
BH CV ECHO MEAS - LVLD AP4: 6.1 CM
BH CV ECHO MEAS - LVLS AP2: 6.3 CM
BH CV ECHO MEAS - LVLS AP4: 5.5 CM
BH CV ECHO MEAS - LVOT AREA (M): 2.5 CM^2
BH CV ECHO MEAS - LVOT AREA: 2.6 CM^2
BH CV ECHO MEAS - LVOT DIAM: 1.8 CM
BH CV ECHO MEAS - LVPWD: 1.1 CM
BH CV ECHO MEAS - MR MAX PG: 35.8 MMHG
BH CV ECHO MEAS - MR MAX VEL: 299 CM/SEC
BH CV ECHO MEAS - MV A DUR: 0.11 SEC
BH CV ECHO MEAS - MV A MAX VEL: 87.3 CM/SEC
BH CV ECHO MEAS - MV DEC SLOPE: 337.3 CM/SEC^2
BH CV ECHO MEAS - MV DEC TIME: 0.27 SEC
BH CV ECHO MEAS - MV E MAX VEL: 135 CM/SEC
BH CV ECHO MEAS - MV E/A: 1.5
BH CV ECHO MEAS - MV MAX PG: 5.7 MMHG
BH CV ECHO MEAS - MV MEAN PG: 2.7 MMHG
BH CV ECHO MEAS - MV P1/2T MAX VEL: 113.5 CM/SEC
BH CV ECHO MEAS - MV P1/2T: 98.5 MSEC
BH CV ECHO MEAS - MV V2 MAX: 119.4 CM/SEC
BH CV ECHO MEAS - MV V2 MEAN: 74.4 CM/SEC
BH CV ECHO MEAS - MV V2 VTI: 27.9 CM
BH CV ECHO MEAS - MVA P1/2T LCG: 1.9 CM^2
BH CV ECHO MEAS - MVA(P1/2T): 2.2 CM^2
BH CV ECHO MEAS - MVA(VTI): 1.6 CM^2
BH CV ECHO MEAS - PA MAX PG (FULL): 2 MMHG
BH CV ECHO MEAS - PA MAX PG: 2.8 MMHG
BH CV ECHO MEAS - PA V2 MAX: 83.6 CM/SEC
BH CV ECHO MEAS - PVA(V,A): 1.3 CM^2
BH CV ECHO MEAS - PVA(V,D): 1.3 CM^2
BH CV ECHO MEAS - QP/QS: 0.46
BH CV ECHO MEAS - RAP SYSTOLE: 8 MMHG
BH CV ECHO MEAS - RV MAX PG: 0.83 MMHG
BH CV ECHO MEAS - RV MEAN PG: 0.4 MMHG
BH CV ECHO MEAS - RV V1 MAX: 45.4 CM/SEC
BH CV ECHO MEAS - RV V1 MEAN: 28 CM/SEC
BH CV ECHO MEAS - RV V1 VTI: 8.5 CM
BH CV ECHO MEAS - RVOT AREA: 2.4 CM^2
BH CV ECHO MEAS - RVOT DIAM: 1.8 CM
BH CV ECHO MEAS - RVSP: 31.5 MMHG
BH CV ECHO MEAS - SI(CUBED): 31 ML/M^2
BH CV ECHO MEAS - SI(LVOT): 27.3 ML/M^2
BH CV ECHO MEAS - SI(MOD-SP2): 20.8 ML/M^2
BH CV ECHO MEAS - SI(MOD-SP4): 18.4 ML/M^2
BH CV ECHO MEAS - SI(TEICH): 26.3 ML/M^2
BH CV ECHO MEAS - SUP REN AO DIAM: 2.3 CM
BH CV ECHO MEAS - SV(CUBED): 50.6 ML
BH CV ECHO MEAS - SV(LVOT): 44.6 ML
BH CV ECHO MEAS - SV(MOD-SP2): 34 ML
BH CV ECHO MEAS - SV(MOD-SP4): 30 ML
BH CV ECHO MEAS - SV(RVOT): 20.6 ML
BH CV ECHO MEAS - SV(TEICH): 42.9 ML
BH CV ECHO MEAS - TAPSE (>1.6): 1.5 CM2
BH CV ECHO MEAS - TR MAX VEL: 242.2 CM/SEC
BH CV XLRA - RV BASE: 3.4 CM
BH CV XLRA - RV LENGTH: 5.6 CM
BH CV XLRA - RV MID: 2.1 CM
BH CV XLRA - TDI S': 9.1 CM/SEC
LEFT ATRIUM VOLUME INDEX: 31 ML/M2
MAXIMAL PREDICTED HEART RATE: 151 BPM
SINUS: 3.5 CM
STJ: 3.1 CM
STRESS TARGET HR: 128 BPM

## 2020-08-04 NOTE — TELEPHONE ENCOUNTER
I left her a voicemail to call me in regards to her BMP results.  Her sodium is low, and I would like for her to stop the Lasix.  We will need to recheck it in a week.

## 2020-08-04 NOTE — TELEPHONE ENCOUNTER
I spoke with the patient regarding her BMP results and her echocardiogram results.  I advised her to stop the Lasix and potassium due to the hyponatremia.  She will have a repeat BMP in 2 weeks.  I also spoke with her about her new cardiomyopathy.  I reviewed these results with Dr. Hand as well.  I was going to start her on lisinopril; however, she reports that since starting the Lasix her blood pressure has been low when she has been dizzy.  Her systolic blood pressures have been in the 90s.  I am going to hold off on starting her on lisinopril.  She will follow-up with me in 2 weeks for reassessment.  I am also going to put a referral in to sleep medicine for a sleep study.

## 2020-08-06 ENCOUNTER — TELEPHONE (OUTPATIENT)
Dept: CARDIOLOGY | Facility: CLINIC | Age: 70
End: 2020-08-06

## 2020-08-06 NOTE — TELEPHONE ENCOUNTER
----- Message from Alberto Lee sent at 8/5/2020  4:14 PM EDT -----   Pt wants to know when she should have her blood work done?      Shelia MCCONNELL  ----- Message -----  From: Teresita Leone APRN  Sent: 8/4/2020   1:44 PM EDT  To: Alberto Lee    Please schedule a 2-week follow-up with me.

## 2020-08-19 ENCOUNTER — LAB (OUTPATIENT)
Dept: LAB | Facility: HOSPITAL | Age: 70
End: 2020-08-19

## 2020-08-19 DIAGNOSIS — R06.09 DYSPNEA ON EXERTION: ICD-10-CM

## 2020-08-19 LAB
ANION GAP SERPL CALCULATED.3IONS-SCNC: 10.3 MMOL/L (ref 5–15)
BUN SERPL-MCNC: 13 MG/DL (ref 8–23)
BUN/CREAT SERPL: 15.3 (ref 7–25)
CALCIUM SPEC-SCNC: 9.5 MG/DL (ref 8.6–10.5)
CHLORIDE SERPL-SCNC: 105 MMOL/L (ref 98–107)
CO2 SERPL-SCNC: 23.7 MMOL/L (ref 22–29)
CREAT SERPL-MCNC: 0.85 MG/DL (ref 0.57–1)
GFR SERPL CREATININE-BSD FRML MDRD: 66 ML/MIN/1.73
GLUCOSE SERPL-MCNC: 111 MG/DL (ref 65–99)
POTASSIUM SERPL-SCNC: 4.2 MMOL/L (ref 3.5–5.2)
SODIUM SERPL-SCNC: 139 MMOL/L (ref 136–145)

## 2020-08-19 PROCEDURE — 80048 BASIC METABOLIC PNL TOTAL CA: CPT

## 2020-08-19 PROCEDURE — 36415 COLL VENOUS BLD VENIPUNCTURE: CPT

## 2020-08-20 ENCOUNTER — TELEPHONE (OUTPATIENT)
Dept: CARDIOLOGY | Facility: CLINIC | Age: 70
End: 2020-08-20

## 2020-08-20 NOTE — TELEPHONE ENCOUNTER
Contains abnormal data Basic Metabolic Panel   Order: [id number]   Status:  Final result   Visible to patient:  No (Not Released) Dx:  Dyspnea on exertion   Specimen Information: Blood        Component  Ref Range & Units 1d ago 2wk ago   Glucose  65 - 99 mg/dL 111High   117High     BUN  8 - 23 mg/dL 13  12    Creatinine  0.57 - 1.00 mg/dL 0.85  0.80    Sodium  136 - 145 mmol/L 139  127Low     Potassium  3.5 - 5.2 mmol/L 4.2  4.5    Chloride  98 - 107 mmol/L 105  89Low     CO2  22.0 - 29.0 mmol/L 23.7  26.6    Calcium  8.6 - 10.5 mg/dL 9.5  9.7    eGFR Non African Amer  >60 mL/min/1.73 66  71    BUN/Creatinine Ratio  7.0 - 25.0 15.3  15.0    Anion Gap  5.0 - 15.0 mmol/L 10.3  11.4    Resulting Agency  [facility]      Narrative   Performed by:  [facility]   GFR Normal >60  Chronic Kidney Disease <60  Kidney Failure <15      Specimen Collected: 08/19/20 10:34 Last Resulted: 08/19/20 11:24         Lab Flowsheet       Order Details       View Encounter       Lab and Collection Details       Routing       Result History               Routing History     Priority Sent On From To Message Type    8/19/2020 11:24 AM Lab, Background User Teresita Leone APRN Results

## 2020-08-24 ENCOUNTER — OFFICE VISIT (OUTPATIENT)
Dept: CARDIOLOGY | Facility: CLINIC | Age: 70
End: 2020-08-24

## 2020-08-24 VITALS
HEART RATE: 131 BPM | BODY MASS INDEX: 21.33 KG/M2 | SYSTOLIC BLOOD PRESSURE: 110 MMHG | WEIGHT: 128 LBS | DIASTOLIC BLOOD PRESSURE: 76 MMHG | HEIGHT: 65 IN

## 2020-08-24 DIAGNOSIS — I48.91 ATRIAL FIBRILLATION WITH RVR (HCC): ICD-10-CM

## 2020-08-24 DIAGNOSIS — I42.8 NICM (NONISCHEMIC CARDIOMYOPATHY) (HCC): Primary | ICD-10-CM

## 2020-08-24 PROCEDURE — 93000 ELECTROCARDIOGRAM COMPLETE: CPT | Performed by: NURSE PRACTITIONER

## 2020-08-24 PROCEDURE — 99214 OFFICE O/P EST MOD 30 MIN: CPT | Performed by: NURSE PRACTITIONER

## 2020-08-24 RX ORDER — METOPROLOL SUCCINATE 25 MG/1
12.5 TABLET, EXTENDED RELEASE ORAL DAILY
Qty: 30 TABLET | Refills: 5 | Status: SHIPPED | OUTPATIENT
Start: 2020-08-24 | End: 2021-08-23

## 2020-08-24 RX ORDER — METOPROLOL SUCCINATE 25 MG/1
12.5 TABLET, EXTENDED RELEASE ORAL DAILY
Qty: 15 TABLET | Refills: 0 | Status: SHIPPED | OUTPATIENT
Start: 2020-08-24 | End: 2020-09-25 | Stop reason: SDUPTHER

## 2020-08-24 NOTE — PROGRESS NOTES
Date of Office Visit: 2020  Encounter Provider: RICHA Danielle  Place of Service: [facility] CARDIOLOGY  Patient Name: Rasheeda Balderas  :1950    Chief Complaint   Patient presents with   • Cardiomyopathy   :     HPI: Rasheeda Balderas is a 70 y.o. female, known to me, who presents today for follow-up.  Old records have been obtained and reviewed by me.  She is a patient of Dr. Hand's with a past cardiac history significant for mitral regurgitation.  In May 2019, she underwent a mitral valve repair with a 30 mm Compa Hooper partial annuloplasty ring and Drew stitch.  This was performed in [place].  At the time of surgery, she developed some paroxysmal atrial fibrillation and was temporarily on amiodarone and warfarin.  These medications were ultimately discontinued.   She was last seen in the office on 2020 for complaints of fatigue, dizziness, and shortness of breath.  The shortness of breath was most noticeable while climbing stairs.  I ordered a TSH, CBC, CMP, and proBNP.  Her proBNP was slightly elevated and I started her on Lasix.  This was ultimately discontinued due to hyponatremia.  I also checked an echocardiogram which revealed mildly decreased LV function with an EF of 41 to 45% and a well-functioning prosthetic mitral valve.  She was scheduled to see me today for follow-up.      Overall she feels about the same.  She does not feel any worse but does not feel any better.  She is still experiencing shortness of breath with exertion.  Although she denies any PND or orthopnea.  She is reporting occasional palpitations and heart racing.  She is also noticing occasional chest tightness during her walks which resolves immediately after she quits walking.  Evidently she stopped taking melatonin which improved her dizziness.  Her blood pressure at home has been low, in the 90s to the 110s systolic.  She does reportedly drink about 10 drinks of alcohol  every week.  She is scheduled to meet with the sleep doctors in September.  She denies any edema or syncope.      Past Medical History:   Diagnosis Date   • Arthritis    • CAD (coronary artery disease)    • Cholelithiasis    • Heartburn    • Mitral valve prolapse syndrome    • Myocardial infarction (CMS/HCC)     pt is not aware of history of MI   • Osteoporosis        Past Surgical History:   Procedure Laterality Date   • CARDIAC CATHETERIZATION N/A 3/7/2019    Procedure: Right and Left Heart Cath;  Surgeon: Cam Hand MD;  Location: [facility] CATH INVASIVE LOCATION;  Service: Cardiology   • CARDIAC CATHETERIZATION N/A 3/7/2019    Procedure: Coronary angiography;  Surgeon: Cam Hand MD;  [facility];  Service: Cardiology   • CARDIAC CATHETERIZATION N/A 3/7/2019    Procedure: Left ventriculography;  Surgeon: Cam Hand MD;  Location: [facility] CATH INVASIVE LOCATION;  Service: Cardiology   • LAPAROSCOPIC CHOLECYSTECTOMY W/ CHOLANGIOGRAPHY     • SHOULDER SURGERY Left        Social History     Socioeconomic History   • Marital status:      Spouse name: Not on file   • Number of children: Not on file   • Years of education: Not on file   • Highest education level: Not on file   Occupational History   • Occupation: professor   Tobacco Use   • Smoking status: Former Smoker   • Smokeless tobacco: Never Used   • Tobacco comment: NO CAFFEINE USE   Substance and Sexual Activity   • Alcohol use: Yes     Alcohol/week: 3.0 standard drinks     Types: 1 Glasses of wine, 1 Cans of beer, 1 Shots of liquor per week     Comment: weekends-socially   • Drug use: No   • Sexual activity: Defer       Family History   Problem Relation Age of Onset   • Brain cancer Mother    • Heart disease Father    • Brain cancer Father    • Stomach cancer Maternal Grandmother    • Cancer Maternal Grandfather    • No Known Problems Paternal Grandmother    • No Known Problems Paternal Grandfather        Review  "of Systems   Constitution: Positive for malaise/fatigue. Negative for chills and fever.   Cardiovascular: Positive for chest pain (occasional tightness), dyspnea on exertion and palpitations. Negative for leg swelling, near-syncope, orthopnea, paroxysmal nocturnal dyspnea and syncope.   Respiratory: Negative for cough and shortness of breath.    Musculoskeletal: Negative for joint pain, joint swelling and myalgias.   Gastrointestinal: Negative for abdominal pain, diarrhea, melena, nausea and vomiting.   Genitourinary: Negative for frequency and hematuria.   Neurological: Negative for light-headedness, numbness, paresthesias and seizures.   Allergic/Immunologic: Negative.    All other systems reviewed and are negative.      Allergies   Allergen Reactions   • No Known Drug Allergy          Current Outpatient Medications:   •  acetaminophen (TYLENOL) 500 MG tablet, Take 500 mg by mouth every 6 (six) hours as needed for mild pain (1-3)., Disp: , Rfl:   •  b complex-C-folic acid 1 MG capsule, Take 1 capsule by mouth Daily., Disp: , Rfl:   •  loratadine (Claritin) 10 MG tablet, Take 10 mg by mouth Daily., Disp: , Rfl:   •  metaxalone (SKELAXIN) 800 MG tablet, Take 800 mg by mouth As Needed for Muscle Spasms., Disp: , Rfl:   •  zoledronic acid (RECLAST) 5 MG/100ML solution injection, Infuse 5 mg into a venous catheter 1 (one) time. 1 time yearly, Disp: , Rfl:   •  apixaban (ELIQUIS) 5 MG tablet tablet, Take 1 tablet by mouth Every 12 (Twelve) Hours., Disp: 60 tablet, Rfl: 11  •  metoprolol succinate XL (TOPROL-XL) 25 MG 24 hr tablet, Take 0.5 tablets by mouth Daily., Disp: 30 tablet, Rfl: 5  •  metoprolol succinate XL (TOPROL-XL) 25 MG 24 hr tablet, Take 0.5 tablets by mouth Daily., Disp: 15 tablet, Rfl: 0      Objective:     Vitals:    08/24/20 0947   BP: 110/76   Pulse: (!) 131   Weight: 58.1 kg (128 lb)   Height: 165.1 cm (65\")     Body mass index is 21.3 kg/m².    PHYSICAL EXAM:    Physical Exam   Constitutional: She " is oriented to person, place, and time. She appears well-developed and well-nourished. No distress.   HENT:   Head: Normocephalic and atraumatic.   Eyes: Pupils are equal, round, and reactive to light.   Neck: No JVD present. No thyromegaly present.   Cardiovascular: Normal heart sounds and intact distal pulses. An irregularly irregular rhythm present. Tachycardia present.   No murmur heard.  Pulmonary/Chest: Effort normal and breath sounds normal. No respiratory distress.   Abdominal: Soft. Bowel sounds are normal. She exhibits no distension. There is no splenomegaly or hepatomegaly. There is no tenderness.   Musculoskeletal: Normal range of motion. She exhibits no edema.   Neurological: She is alert and oriented to person, place, and time.   Skin: Skin is warm and dry. She is not diaphoretic. No erythema.   Psychiatric: She has a normal mood and affect. Her behavior is normal. Judgment normal.         ECG 12 Lead  Date/Time: 8/24/2020 9:51 AM  Performed by: Teresita Leone APRN  Authorized by: Teresita Leone APRN   Comparison: compared with previous ECG from 7/24/2020  Comparison to previous ECG: New atrial fibrillation  Rhythm: atrial fibrillation  Ectopy: unifocal PVCs  Rate: tachycardic  BPM: 131    Clinical impression: abnormal EKG  Comments: Indication: Cardiomyopathy              Assessment:       Diagnosis Plan   1. NICM (nonischemic cardiomyopathy) (CMS/HCC)  ECG 12 Lead   2. Atrial fibrillation with RVR (CMS/HCC)  Holter Monitor - 72 Hour Up To 21 Days     Orders Placed This Encounter   Procedures   • Holter Monitor - 72 Hour Up To 21 Days     Standing Status:   Future     Number of Occurrences:   1     Standing Expiration Date:   8/24/2021     Scheduling Instructions:      14 day zio     Order Specific Question:   Reason for exam?     Answer:   AFib     Order Specific Question:   AFib specification?     Answer:   Paroxysmal   • ECG 12 Lead     This order was created via procedure documentation           Plan:      1.  Atrial fibrillation.  She is in atrial fibrillation RVR today.  I suspect this is the underlying cause of her symptoms.  I am going to start her on low-dose beta-blocker due to her low blood pressure.  I will put her on 12.5 mg of Toprol and adjust as tolerated.  She has a CHADS2-VASc score of 3 and will need to be anticoagulated.  She was previously on warfarin following her mitral valve surgery.  However, she would prefer Eliquis.  I will start her on 5 mg twice daily.  I am also going to send her home with a 14-day ZIO to assess her average rate control.  She is having a sleep study next month which I feel is appropriate.  I encouraged her to limit her alcohol intake.  I recommended there be more days out of the week that she does not drink than days that she does, and when she does drink, to limit her intake to 2 drinks.      2.  Nonischemic cardiomyopathy.  EF 41-45%.  I suspect this is due to the atrial fibrillation.  Cardiac catheterization from March 2019 revealed only mild coronary disease with 10% luminal irregularities of the proximal RCA.   I am going to start her on Toprol today.  I would like to optimize her medical therapy and repeat an echocardiogram in 3 months for reassessment.      Overall I think she is stable.  She is reporting occasional chest tightness on exertion.  Although she does not have any ischemic changes on her EKG.  I suspect this could be related to the atrial fibrillation RVR.  Ultimately, if her symptoms persist and her EF does not improve, we may need to consider an ischemic work-up.  I am going to have her follow-up with me in 3 months.  She was encouraged to call me should she develop any new or worsening symptoms before then.  Further recommendations will be made pending the results of the ZIO.      As always, it has been a pleasure to participate in your patient's care.      Sincerely,         RICHA Christine

## 2020-09-11 ENCOUNTER — OFFICE VISIT (OUTPATIENT)
Dept: SLEEP MEDICINE | Facility: HOSPITAL | Age: 70
End: 2020-09-11

## 2020-09-11 VITALS
SYSTOLIC BLOOD PRESSURE: 107 MMHG | BODY MASS INDEX: 20.67 KG/M2 | OXYGEN SATURATION: 98 % | HEART RATE: 125 BPM | WEIGHT: 128.6 LBS | HEIGHT: 66 IN | DIASTOLIC BLOOD PRESSURE: 60 MMHG

## 2020-09-11 DIAGNOSIS — I42.0 CARDIOMYOPATHY, DILATED (HCC): ICD-10-CM

## 2020-09-11 DIAGNOSIS — R06.83 SNORING: Primary | ICD-10-CM

## 2020-09-11 DIAGNOSIS — G47.10 HYPERSOMNIA: ICD-10-CM

## 2020-09-11 DIAGNOSIS — I48.0 PAROXYSMAL ATRIAL FIBRILLATION (HCC): ICD-10-CM

## 2020-09-11 PROCEDURE — G0463 HOSPITAL OUTPT CLINIC VISIT: HCPCS

## 2020-09-11 NOTE — PROGRESS NOTES
[facility]  Telephone: [phone] / Fax: [phone] [place]  Telephone: [phone] / Fax: [phone] Kaylin Martínez    Referring Physician: Malini Simeon MD  PCP: Malini Simeon MD    Reason for consult:  sleep apnea    Rasheeda Balderas is a 70 y.o.female  was seen in the Sleep Disorders Center today for evaluation of sleep apnea. She has CMP and atrial fibrillation. Dr Hand referred her to see us for evaluation of possible STEVE as contributing factor to the arrhythmia.  She reports occasional snoring which occurs in all sleep positions. She denies gasping for breath/choking.  She feels excessively sleepy and tired during the day.  Her sleep schedule is 11:30pm-7am. She falls asleep within 1-2 minutes.    She report RLS which is better if she takes Tylenol. It is worse if she is sitting quietly and better with movement.  She wakes up at 3-4am and at times unable to fall asleep.    SH- retired, former smoker age [age], 10 alc per week, no caffeine, no drugs.    ROS- +myalgias, +irregular heartbeat, +SOA, +dizziness, +diarrhea, rest is negative.    Rasheeda Balderas  has a past medical history of Arthritis, CAD (coronary artery disease), Cholelithiasis, Heartburn, Mitral valve prolapse syndrome, Myocardial infarction (CMS/HCC), and Osteoporosis.    Current Medications:    Current Outpatient Medications:   •  acetaminophen (TYLENOL) 500 MG tablet, Take 500 mg by mouth every 6 (six) hours as needed for mild pain (1-3)., Disp: , Rfl:   •  apixaban (ELIQUIS) 5 MG tablet tablet, Take 1 tablet by mouth Every 12 (Twelve) Hours., Disp: 60 tablet, Rfl: 11  •  b complex-C-folic acid 1 MG capsule, Take 1 capsule by mouth Daily., Disp: , Rfl:   •  loratadine (Claritin) 10 MG tablet, Take 10 mg by mouth Daily., Disp: , Rfl:   •  metaxalone (SKELAXIN) 800 MG tablet, Take 800 mg by mouth As Needed for Muscle Spasms., Disp: , Rfl:   •  metoprolol succinate XL (TOPROL-XL) 25 MG 24 hr tablet,  "Take 0.5 tablets by mouth Daily., Disp: 30 tablet, Rfl: 5  •  metoprolol succinate XL (TOPROL-XL) 25 MG 24 hr tablet, Take 0.5 tablets by mouth Daily., Disp: 15 tablet, Rfl: 0  •  zoledronic acid (RECLAST) 5 MG/100ML solution injection, Infuse 5 mg into a venous catheter 1 (one) time. 1 time yearly, Disp: , Rfl:     I have reviewed Past Medical History, Past Surgical History, Medication List, Social History and Family History as entered in Sleep Questionnaire and EPIC.    ESS  5   Vital Signs /60 (BP Location: Left arm, Patient Position: Sitting)   Pulse (!) 125   Ht 166.4 cm (65.5\")   Wt 58.3 kg (128 lb 9.6 oz)   LMP  (LMP Unknown)   SpO2 98%   BMI 21.07 kg/m²  Body mass index is 21.07 kg/m².    General Alert and oriented. No acute distress noted   Pharynx/Throat Class III Mallampati airway, large tongue, no evidence of redundant lateral pharyngeal tissue. No oral lesions. No thrush. Moist mucous membranes.   Head Normocephalic. Symmetrical. Atraumatic.    Nose No septal deviation. No drainage   Chest Wall Normal shape. Symmetric expansion with respiration. No tenderness.   Neck Trachea midline, no thyromegaly or adenopathy    Lungs Clear to auscultation bilaterally. No wheezes. No rhonchi. No rales. Respirations regular, even and unlabored.   Heart Regular rhythm and normal rate. Normal S1 and S2. No murmur   Abdomen Soft, non-tender and non-distended. Normal bowel sounds. No masses.   Extremities Moves all extremities well. No edema   Psychiatric Normal mood and affect.        Impression:  1. Snoring    2. Cardiomyopathy, dilated (CMS/HCC)    3. Hypersomnia    4. Paroxysmal atrial fibrillation (CMS/HCC)          Plan:  I discussed the pathophysiology of obstructive sleep apnea with the patient.  We discussed the adverse outcomes associated with untreated sleep-disordered breathing.  We discussed treatment modalities of obstructive sleep apnea including CPAP device. Sleep study will be scheduled to " establish a definitive diagnosis of sleep disorder breathing.  Weight loss will be strongly beneficial in order to reduce the severity of sleep-disordered breathing.  Patient has narrow oropharyngeal structure.  Caution during activities that require prolonged concentration is strongly advised.  After sleep study results are available, patient will be notified, and appointment will be scheduled to discuss sleep study results and treatment recommendations.    Rx for Ambien 5mg x 1 was provided for in lab polysomnogram. Patient was instructed to bring the Ambien tablet to the sleep lab and take at lights out . Strict instructions were given to NOT take the Ambien at home.    Instructions for the night sleep tech  It is permitted to use zolpidem 5 mg prior to 1 AM.  If patient has Obstructive Sleep Apnea on diagnostic portion with AHI > 5 please do titration with CPAP and/or BIPAP per sleep disorder center policy. If patient has Central Sleep Apnea on diagnostic portion with index > 5, do titration with Adaptive Servo Titration device. If patient requires addition of supplemental oxygen therapy during PAP titration study, inform MD in AM.     I appreciate the opportunity to participate in this patient's care.      RICHA Armenta  [facility]  Phone: [phone]      Part of this note may be an electronic transcription/translation of spoken language to printed text using the Dragon Dictation System. Some errors may exist even though the document was edited.

## 2020-09-17 ENCOUNTER — TELEPHONE (OUTPATIENT)
Dept: CARDIOLOGY | Facility: CLINIC | Age: 70
End: 2020-09-17

## 2020-09-17 RX ORDER — DIGOXIN 125 MCG
125 TABLET ORAL DAILY
Qty: 30 TABLET | Refills: 11 | Status: SHIPPED | OUTPATIENT
Start: 2020-09-22 | End: 2020-11-24 | Stop reason: SDUPTHER

## 2020-09-17 RX ORDER — DIGOXIN 250 MCG
250 TABLET ORAL DAILY
Qty: 5 TABLET | Refills: 0 | Status: SHIPPED | OUTPATIENT
Start: 2020-09-17 | End: 2020-09-25

## 2020-09-17 NOTE — TELEPHONE ENCOUNTER
I spoke with the patient regarding her Holter results.  She reports that her blood pressure is ranging from the 80s systolic to the 110s systolic.  She denies any symptoms.  I reviewed her Holter results with Dr. Hand.  Unfortunately, her blood pressure will not tolerate increased beta-blockade.  We are going to start her on digoxin.  Will take 250 mcg for 5 days and then 125 mcg daily.  She will come in next Friday for an EKG.  Ultimately, if we are unable to control her heart rate, we may need to refer her to RUTHANN Strange, can you please schedule her for an EKG and blood pressure check next Friday?

## 2020-09-25 ENCOUNTER — CLINICAL SUPPORT (OUTPATIENT)
Dept: CARDIOLOGY | Facility: CLINIC | Age: 70
End: 2020-09-25

## 2020-09-25 DIAGNOSIS — I42.8 NON-ISCHEMIC CARDIOMYOPATHY (HCC): ICD-10-CM

## 2020-09-25 DIAGNOSIS — I48.91 ATRIAL FIBRILLATION WITH RVR (HCC): Primary | ICD-10-CM

## 2020-09-25 PROCEDURE — 93000 ELECTROCARDIOGRAM COMPLETE: CPT | Performed by: NURSE PRACTITIONER

## 2020-09-25 NOTE — PROGRESS NOTES
Procedure     ECG 12 Lead    Date/Time: 9/25/2020 11:42 AM  Performed by: Teresita Leone APRN  Authorized by: Teresita Leone APRN   Comparison: compared with previous ECG from 8/24/2020  Comparison to previous ECG: Heart rate much better controlled  Rhythm: atrial fibrillation  Rate: normal  BPM: 76  Other findings: non-specific ST-T wave changes    Clinical impression: abnormal EKG  Comments: Indication: Atrial fibrillation           Her rate is much better.  She feels better.  I will order a repeat echocardiogram for the same day she sees me in November.

## 2020-09-25 NOTE — PROGRESS NOTES
Patient came in today for and EKG and BP Check.  Her vitals are as follows: /70 HR76      This was addressed with RICHA Christine. Per Teresita the patient is to continue her current digoxin dose and she is free to go.

## 2020-10-02 ENCOUNTER — TRANSCRIBE ORDERS (OUTPATIENT)
Dept: ADMINISTRATIVE | Facility: HOSPITAL | Age: 70
End: 2020-10-02

## 2020-10-02 DIAGNOSIS — Z01.818 OTHER SPECIFIED PRE-OPERATIVE EXAMINATION: Primary | ICD-10-CM

## 2020-10-05 ENCOUNTER — LAB (OUTPATIENT)
Dept: LAB | Facility: HOSPITAL | Age: 70
End: 2020-10-05

## 2020-10-05 DIAGNOSIS — Z01.818 OTHER SPECIFIED PRE-OPERATIVE EXAMINATION: ICD-10-CM

## 2020-10-05 PROCEDURE — U0004 COV-19 TEST NON-CDC HGH THRU: HCPCS

## 2020-10-05 PROCEDURE — C9803 HOPD COVID-19 SPEC COLLECT: HCPCS

## 2020-10-06 LAB — SARS-COV-2 RNA RESP QL NAA+PROBE: NOT DETECTED

## 2020-10-07 ENCOUNTER — HOSPITAL ENCOUNTER (OUTPATIENT)
Dept: SLEEP MEDICINE | Facility: HOSPITAL | Age: 70
Discharge: HOME | End: 2020-10-07
Admitting: NURSE PRACTITIONER

## 2020-10-07 DIAGNOSIS — G47.10 HYPERSOMNIA: ICD-10-CM

## 2020-10-07 DIAGNOSIS — R06.83 SNORING: ICD-10-CM

## 2020-10-07 PROCEDURE — 95810 POLYSOM 6/> YRS 4/> PARAM: CPT

## 2020-10-21 ENCOUNTER — TELEPHONE (OUTPATIENT)
Dept: SLEEP MEDICINE | Facility: HOSPITAL | Age: 70
End: 2020-10-21

## 2020-10-21 ENCOUNTER — OFFICE VISIT (OUTPATIENT)
Dept: SLEEP MEDICINE | Facility: HOSPITAL | Age: 70
End: 2020-10-21

## 2020-10-21 ENCOUNTER — LAB (OUTPATIENT)
Dept: LAB | Facility: HOSPITAL | Age: 70
End: 2020-10-21

## 2020-10-21 VITALS
WEIGHT: 128.8 LBS | OXYGEN SATURATION: 98 % | BODY MASS INDEX: 20.7 KG/M2 | SYSTOLIC BLOOD PRESSURE: 126 MMHG | DIASTOLIC BLOOD PRESSURE: 71 MMHG | HEIGHT: 66 IN | HEART RATE: 79 BPM

## 2020-10-21 DIAGNOSIS — D50.9 IRON DEFICIENCY ANEMIA, UNSPECIFIED IRON DEFICIENCY ANEMIA TYPE: Primary | ICD-10-CM

## 2020-10-21 DIAGNOSIS — G47.61 PLMD (PERIODIC LIMB MOVEMENT DISORDER): ICD-10-CM

## 2020-10-21 DIAGNOSIS — D50.9 IRON DEFICIENCY ANEMIA, UNSPECIFIED IRON DEFICIENCY ANEMIA TYPE: ICD-10-CM

## 2020-10-21 LAB
IRON 24H UR-MRATE: 122 MCG/DL (ref 37–145)
IRON SATN MFR SERPL: 30 % (ref 20–50)
TIBC SERPL-MCNC: 408 MCG/DL (ref 298–536)
TRANSFERRIN SERPL-MCNC: 274 MG/DL (ref 200–360)

## 2020-10-21 PROCEDURE — 36415 COLL VENOUS BLD VENIPUNCTURE: CPT

## 2020-10-21 PROCEDURE — 84466 ASSAY OF TRANSFERRIN: CPT

## 2020-10-21 PROCEDURE — 83540 ASSAY OF IRON: CPT

## 2020-10-21 PROCEDURE — G0463 HOSPITAL OUTPT CLINIC VISIT: HCPCS

## 2020-10-21 NOTE — PROGRESS NOTES
[facility]  Telephone: [phone] / Fax: [phone] [place]  Telephone: [phone] / Fax: [phone] Kaylin Martínez    PCP: Malini Simeon MD    Reason for visit: STEVE f/u    Rasheeda Balderas is a 70 y.o.female  was last seen at [facility] sleep lab in September. She had in lab PSG in view of atrial fibrillation and CMP and is here today to discuss sleep study results. She continues to go to bed at 11pm and is up at 8am. No interval health changes reported.   Since last visit patient has done well on auto CPAP.  Sleep quality has improved on CPAP and excessive sleepiness/snoring is no longer an issue.  There is no complaint of aerophagia, excessive dry mouth or air leak.    SH- no tobacco, 8 alc per week, 0 caffeine.    ROS- negative    Current Medications:    Current Outpatient Medications:   •  acetaminophen (TYLENOL) 500 MG tablet, Take 500 mg by mouth every 6 (six) hours as needed for mild pain (1-3)., Disp: , Rfl:   •  apixaban (ELIQUIS) 5 MG tablet tablet, Take 1 tablet by mouth Every 12 (Twelve) Hours., Disp: 60 tablet, Rfl: 11  •  b complex-C-folic acid 1 MG capsule, Take 1 capsule by mouth Daily., Disp: , Rfl:   •  digoxin (LANOXIN) 125 MCG tablet, Take 1 tablet by mouth Daily., Disp: 30 tablet, Rfl: 11  •  loratadine (Claritin) 10 MG tablet, Take 10 mg by mouth Daily., Disp: , Rfl:   •  metaxalone (SKELAXIN) 800 MG tablet, Take 800 mg by mouth As Needed for Muscle Spasms., Disp: , Rfl:   •  metoprolol succinate XL (TOPROL-XL) 25 MG 24 hr tablet, Take 0.5 tablets by mouth Daily., Disp: 30 tablet, Rfl: 5  •  zoledronic acid (RECLAST) 5 MG/100ML solution injection, Infuse 5 mg into a venous catheter 1 (one) time. 1 time yearly, Disp: , Rfl:    also entered in Sleep Questionnaire    Patient  has a past medical history of Arthritis, CAD (coronary artery disease), Cholelithiasis, Heartburn, Mitral valve prolapse syndrome, Myocardial infarction (CMS/HCC), and Osteoporosis.    I have  "reviewed the Past Medical History, Past Surgical History, Social History and Family History.    Vital Signs /71 (BP Location: Right arm, Patient Position: Sitting)   Pulse 79   Ht 166.4 cm (65.5\")   Wt 58.4 kg (128 lb 12.8 oz)   LMP  (LMP Unknown)   SpO2 98%   BMI 21.11 kg/m²  Body mass index is 21.11 kg/m².    General Alert and oriented. No acute distress noted   Pharynx/Throat Class IV Mallampati airway, large tongue, no evidence of redundant lateral pharyngeal tissue. No oral lesions. No thrush. Moist mucous membranes.   Head Normocephalic. Symmetrical. Atraumatic.    Nose No septal deviation. No drainage   Chest Wall Normal shape. Symmetric expansion with respiration. No tenderness.   Neck Trachea midline, no thyromegaly or adenopathy    Lungs Clear to auscultation bilaterally. No wheezes. No rhonchi. No rales. Respirations regular, even and unlabored.   Heart Regular rhythm and normal rate. Normal S1 and S2. No murmur   Abdomen Soft, non-tender and non-distended. Normal bowel sounds. No masses.   Extremities Moves all extremities well. No edema   Psychiatric Normal mood and affect.       Testing:    Study-Results:  Overnight polysomnogram study from 11 PM to 5:15 AM.  Sleep efficiency poor at 59.6% with 3.73 hours total sleep time.  Sleep distribution reduced slow-wave sleep at 6.5% and reduced REM sleep at 8.1%.  AHI index 1.6.  Due to absence of supine sleep severity underestimated.  There was only 18 minutes of REM sleep with no sleep disordered breathing seen.  Oxygen saturation remained above 88%.  PLM index 89 with PLM arousal index 9.1.       Impression:  1. Iron deficiency anemia, unspecified iron deficiency anemia type - possible etiology of PLMD-check iron profile   2. PLMD (periodic limb movement disorder)    3       Atrial fibrillation/Cardiomyopathy    Plan  Get iron profile done to see if iron deficiency contributes to PLMD/RLS.  She does not wish to start medications for RLS at this " time.  She was advised to abstain from caffeine intake in the diet.  Good sleep hygiene is advised.     I reviewed sleep study results with patient. It showed no evidence of STEVE.        I appreciate the opportunity to participate in this patient's care.    She will f/u with us PRN.      RICHA Armenta  [facility]  Phone: [phone]

## 2020-10-21 NOTE — TELEPHONE ENCOUNTER
----- Message from RICHA Armenta sent at 10/21/2020  4:56 PM EDT -----  Macrina please let patient know iron profile is negative. Good news.  ----- Message -----  From: Lab, Background User  Sent: 10/21/2020   3:25 PM EDT  To: RICHA Armenta

## 2020-11-09 ENCOUNTER — APPOINTMENT (OUTPATIENT)
Dept: WOMENS IMAGING | Facility: HOSPITAL | Age: 70
End: 2020-11-09

## 2020-11-09 PROCEDURE — 77067 SCR MAMMO BI INCL CAD: CPT | Performed by: RADIOLOGY

## 2020-11-09 PROCEDURE — 77063 BREAST TOMOSYNTHESIS BI: CPT | Performed by: RADIOLOGY

## 2020-11-24 ENCOUNTER — OFFICE VISIT (OUTPATIENT)
Dept: CARDIOLOGY | Facility: CLINIC | Age: 70
End: 2020-11-24

## 2020-11-24 ENCOUNTER — HOSPITAL ENCOUNTER (OUTPATIENT)
Dept: CARDIOLOGY | Facility: HOSPITAL | Age: 70
Discharge: HOME | End: 2020-11-24
Admitting: NURSE PRACTITIONER

## 2020-11-24 VITALS
BODY MASS INDEX: 21.66 KG/M2 | DIASTOLIC BLOOD PRESSURE: 97 MMHG | SYSTOLIC BLOOD PRESSURE: 128 MMHG | WEIGHT: 130 LBS | HEART RATE: 105 BPM | HEIGHT: 65 IN

## 2020-11-24 VITALS
DIASTOLIC BLOOD PRESSURE: 68 MMHG | WEIGHT: 128.2 LBS | SYSTOLIC BLOOD PRESSURE: 110 MMHG | OXYGEN SATURATION: 98 % | HEIGHT: 66 IN | HEART RATE: 57 BPM | BODY MASS INDEX: 20.6 KG/M2 | RESPIRATION RATE: 18 BRPM

## 2020-11-24 DIAGNOSIS — I42.8 NICM (NONISCHEMIC CARDIOMYOPATHY) (HCC): ICD-10-CM

## 2020-11-24 DIAGNOSIS — Z98.890 S/P MVR (MITRAL VALVE REPAIR): ICD-10-CM

## 2020-11-24 DIAGNOSIS — I48.91 ATRIAL FIBRILLATION WITH RVR (HCC): ICD-10-CM

## 2020-11-24 DIAGNOSIS — I34.0 NONRHEUMATIC MITRAL VALVE REGURGITATION: Primary | ICD-10-CM

## 2020-11-24 DIAGNOSIS — I42.8 NON-ISCHEMIC CARDIOMYOPATHY (HCC): ICD-10-CM

## 2020-11-24 DIAGNOSIS — I48.0 PAROXYSMAL ATRIAL FIBRILLATION (HCC): ICD-10-CM

## 2020-11-24 DIAGNOSIS — I05.0 MITRAL VALVE STENOSIS, UNSPECIFIED ETIOLOGY: ICD-10-CM

## 2020-11-24 LAB
AORTIC ARCH: 2.2 CM
ASCENDING AORTA: 3.1 CM
BH CV ECHO MEAS - ACS: 1.9 CM
BH CV ECHO MEAS - AO ARCH DIAM (PROXIMAL TRANS.): 2.2 CM
BH CV ECHO MEAS - AO MAX PG (FULL): 5.4 MMHG
BH CV ECHO MEAS - AO MAX PG: 7.7 MMHG
BH CV ECHO MEAS - AO MEAN PG (FULL): 4 MMHG
BH CV ECHO MEAS - AO MEAN PG: 5 MMHG
BH CV ECHO MEAS - AO ROOT AREA (BSA CORRECTED): 1.9
BH CV ECHO MEAS - AO ROOT AREA: 8 CM^2
BH CV ECHO MEAS - AO ROOT DIAM: 3.2 CM
BH CV ECHO MEAS - AO V2 MAX: 139 CM/SEC
BH CV ECHO MEAS - AO V2 MEAN: 105 CM/SEC
BH CV ECHO MEAS - AO V2 VTI: 24.7 CM
BH CV ECHO MEAS - ASC AORTA: 3.1 CM
BH CV ECHO MEAS - AVA(I,A): 1.3 CM^2
BH CV ECHO MEAS - AVA(I,D): 1.3 CM^2
BH CV ECHO MEAS - AVA(V,A): 1.4 CM^2
BH CV ECHO MEAS - AVA(V,D): 1.4 CM^2
BH CV ECHO MEAS - BSA(HAYCOCK): 1.6 M^2
BH CV ECHO MEAS - BSA: 1.6 M^2
BH CV ECHO MEAS - BZI_BMI: 21.6 KILOGRAMS/M^2
BH CV ECHO MEAS - BZI_METRIC_HEIGHT: 165.1 CM
BH CV ECHO MEAS - BZI_METRIC_WEIGHT: 59 KG
BH CV ECHO MEAS - EDV(MOD-SP2): 126 ML
BH CV ECHO MEAS - EDV(MOD-SP4): 136 ML
BH CV ECHO MEAS - EDV(TEICH): 107.5 ML
BH CV ECHO MEAS - EF(CUBED): 33 %
BH CV ECHO MEAS - EF(MOD-BP): 35.6 %
BH CV ECHO MEAS - EF(MOD-SP2): 33.3 %
BH CV ECHO MEAS - EF(MOD-SP4): 38.2 %
BH CV ECHO MEAS - EF(TEICH): 26.9 %
BH CV ECHO MEAS - ESV(MOD-SP2): 84 ML
BH CV ECHO MEAS - ESV(MOD-SP4): 84 ML
BH CV ECHO MEAS - ESV(TEICH): 78.6 ML
BH CV ECHO MEAS - FS: 12.5 %
BH CV ECHO MEAS - IVS/LVPW: 1.1
BH CV ECHO MEAS - IVSD: 1.1 CM
BH CV ECHO MEAS - LAT PEAK E' VEL: 4.9 CM/SEC
BH CV ECHO MEAS - LV DIASTOLIC VOL/BSA (35-75): 82.6 ML/M^2
BH CV ECHO MEAS - LV MASS(C)D: 181.9 GRAMS
BH CV ECHO MEAS - LV MASS(C)DI: 110.4 GRAMS/M^2
BH CV ECHO MEAS - LV MAX PG: 2.3 MMHG
BH CV ECHO MEAS - LV MEAN PG: 1 MMHG
BH CV ECHO MEAS - LV SYSTOLIC VOL/BSA (12-30): 51 ML/M^2
BH CV ECHO MEAS - LV V1 MAX: 75.7 CM/SEC
BH CV ECHO MEAS - LV V1 MEAN: 53.7 CM/SEC
BH CV ECHO MEAS - LV V1 VTI: 12.2 CM
BH CV ECHO MEAS - LVIDD: 4.8 CM
BH CV ECHO MEAS - LVIDS: 4.2 CM
BH CV ECHO MEAS - LVLD AP2: 7.1 CM
BH CV ECHO MEAS - LVLD AP4: 6.9 CM
BH CV ECHO MEAS - LVLS AP2: 6.7 CM
BH CV ECHO MEAS - LVLS AP4: 6.5 CM
BH CV ECHO MEAS - LVOT AREA (M): 2.5 CM^2
BH CV ECHO MEAS - LVOT AREA: 2.5 CM^2
BH CV ECHO MEAS - LVOT DIAM: 1.8 CM
BH CV ECHO MEAS - LVPWD: 1 CM
BH CV ECHO MEAS - MED PEAK E' VEL: 5.3 CM/SEC
BH CV ECHO MEAS - MR MAX PG: 37.9 MMHG
BH CV ECHO MEAS - MR MAX VEL: 308 CM/SEC
BH CV ECHO MEAS - MV DEC SLOPE: 362 CM/SEC^2
BH CV ECHO MEAS - MV DEC TIME: 0.55 SEC
BH CV ECHO MEAS - MV E MAX VEL: 152 CM/SEC
BH CV ECHO MEAS - MV MAX PG: 15.1 MMHG
BH CV ECHO MEAS - MV MEAN PG: 8 MMHG
BH CV ECHO MEAS - MV P1/2T MAX VEL: 189 CM/SEC
BH CV ECHO MEAS - MV P1/2T: 152.9 MSEC
BH CV ECHO MEAS - MV V2 MAX: 194 CM/SEC
BH CV ECHO MEAS - MV V2 MEAN: 133 CM/SEC
BH CV ECHO MEAS - MV V2 VTI: 48.5 CM
BH CV ECHO MEAS - MVA P1/2T LCG: 1.2 CM^2
BH CV ECHO MEAS - MVA(P1/2T): 1.4 CM^2
BH CV ECHO MEAS - MVA(VTI): 0.64 CM^2
BH CV ECHO MEAS - PA MAX PG (FULL): 1.7 MMHG
BH CV ECHO MEAS - PA MAX PG: 2.5 MMHG
BH CV ECHO MEAS - PA V2 MAX: 78.7 CM/SEC
BH CV ECHO MEAS - PVA(V,A): 2.3 CM^2
BH CV ECHO MEAS - PVA(V,D): 2.3 CM^2
BH CV ECHO MEAS - QP/QS: 1.1
BH CV ECHO MEAS - RAP SYSTOLE: 8 MMHG
BH CV ECHO MEAS - RV MAX PG: 0.76 MMHG
BH CV ECHO MEAS - RV MEAN PG: 0 MMHG
BH CV ECHO MEAS - RV V1 MAX: 43.7 CM/SEC
BH CV ECHO MEAS - RV V1 MEAN: 29.6 CM/SEC
BH CV ECHO MEAS - RV V1 VTI: 8.4 CM
BH CV ECHO MEAS - RVOT AREA: 4.2 CM^2
BH CV ECHO MEAS - RVOT DIAM: 2.3 CM
BH CV ECHO MEAS - RVSP: 27.2 MMHG
BH CV ECHO MEAS - SI(AO): 120.6 ML/M^2
BH CV ECHO MEAS - SI(CUBED): 22.2 ML/M^2
BH CV ECHO MEAS - SI(LVOT): 18.8 ML/M^2
BH CV ECHO MEAS - SI(MOD-SP2): 25.5 ML/M^2
BH CV ECHO MEAS - SI(MOD-SP4): 31.6 ML/M^2
BH CV ECHO MEAS - SI(TEICH): 17.6 ML/M^2
BH CV ECHO MEAS - SUP REN AO DIAM: 2.2 CM
BH CV ECHO MEAS - SV(AO): 198.6 ML
BH CV ECHO MEAS - SV(CUBED): 36.5 ML
BH CV ECHO MEAS - SV(LVOT): 31 ML
BH CV ECHO MEAS - SV(MOD-SP2): 42 ML
BH CV ECHO MEAS - SV(MOD-SP4): 52 ML
BH CV ECHO MEAS - SV(RVOT): 34.7 ML
BH CV ECHO MEAS - SV(TEICH): 28.9 ML
BH CV ECHO MEAS - TAPSE (>1.6): 0.96 CM
BH CV ECHO MEAS - TR MAX VEL: 219 CM/SEC
BH CV ECHO MEASUREMENTS AVERAGE E/E' RATIO: 29.8
BH CV XLRA - RV BASE: 4.1 CM
BH CV XLRA - RV LENGTH: 6.6 CM
BH CV XLRA - RV MID: 2.5 CM
BH CV XLRA - TDI S': 13.7 CM/SEC
LEFT ATRIUM VOLUME INDEX: 80 ML/M2
LEFT ATRIUM VOLUME: 132 CM3
MAXIMAL PREDICTED HEART RATE: 151 BPM
SINUS: 3.3 CM
STJ: 3.1 CM
STRESS TARGET HR: 128 BPM

## 2020-11-24 PROCEDURE — 93000 ELECTROCARDIOGRAM COMPLETE: CPT | Performed by: NURSE PRACTITIONER

## 2020-11-24 PROCEDURE — 99214 OFFICE O/P EST MOD 30 MIN: CPT | Performed by: NURSE PRACTITIONER

## 2020-11-24 PROCEDURE — 93306 TTE W/DOPPLER COMPLETE: CPT

## 2020-11-24 PROCEDURE — 93306 TTE W/DOPPLER COMPLETE: CPT | Performed by: INTERNAL MEDICINE

## 2020-11-24 RX ORDER — DIGOXIN 125 MCG
125 TABLET ORAL DAILY
Qty: 90 TABLET | Refills: 3 | Status: SHIPPED | OUTPATIENT
Start: 2020-11-24 | End: 2021-11-01

## 2020-11-24 NOTE — PROGRESS NOTES
Date of Office Visit: 2020  Encounter Provider: RICHA Danielle  Place of Service: [facility] CARDIOLOGY  Patient Name: Rasheeda Balderas  :1950    Chief Complaint   Patient presents with   • Atrial Fibrillation     3 MTH FOLLOW UP   • Cardiomyopathy   :     HPI: Rasheeda Balderas is a 70 y.o. female, known to me, who presents today for follow-up.  Old records have been obtained and reviewed by me.  She is a patient of Dr. Massey with a past cardiac history significant for mitral regurgitation.  In May 2019, she underwent a mitral valve repair in [place].  In July of this year, she presented with complaints of fatigue and shortness of breath and was found to have an elevated proBNP.  Echocardiogram revealed mildly decreased LV function with an EF of 41 to 45% and a well-functioning prosthetic mitral valve.  She followed up with me in the office on 2020 at which time she was noted to be in atrial fibrillation RVR.  I started her on Toprol and Eliquis.  Due to poor heart rate control and hypotension, she was started on digoxin for better heart rate control.  She was advised to follow-up with me in 3 months with a repeat echocardiogram.   Overall she is doing well.  She denies any chest pain, palpitations, edema, dizziness, or syncope.  She denies any bleeding difficulties or melena.  Evidently there was some confusion regarding her metoprolol as she has not been taking it since she was started on the digoxin.  Although she has also not been dizzy since this medication was stopped.  She has some shortness of breath when climbing stairs but not on flat ground.  She denies any PND or orthopnea.  She did undergo a sleep evaluation and did not have sleep apnea.  She drinks wine on the weekends.  She was also recently diagnosed with restless leg syndrome.    Past Medical History:   Diagnosis Date   • Arthritis    • CAD (coronary artery disease)    • Cholelithiasis    •  Heartburn    • Mitral valve prolapse syndrome    • Myocardial infarction (CMS/HCC)     pt is not aware of history of MI   • Osteoporosis        Past Surgical History:   Procedure Laterality Date   • CARDIAC CATHETERIZATION N/A 3/7/2019    Procedure: Right and Left Heart Cath;  Surgeon: Cam Hand MD;  Location:  [facility];  Service: Cardiology   • CARDIAC CATHETERIZATION N/A 3/7/2019    Procedure: Coronary angiography;  Surgeon: Cam Hand MD;  Location:  [facility];  Service: Cardiology   • CARDIAC CATHETERIZATION N/A 3/7/2019    Procedure: Left ventriculography;  Surgeon: Cam Hand MD;  Location:  [facility];  Service: Cardiology   • LAPAROSCOPIC CHOLECYSTECTOMY W/ CHOLANGIOGRAPHY     • SHOULDER SURGERY Left        Social History     Socioeconomic History   • Marital status:      Spouse name: Not on file   • Number of children: Not on file   • Years of education: Not on file   • Highest education level: Not on file   Occupational History   • Occupation: professor   Tobacco Use   • Smoking status: Former Smoker   • Smokeless tobacco: Never Used   • Tobacco comment: NO CAFFEINE USE   Substance and Sexual Activity   • Alcohol use: Yes     Alcohol/week: 3.0 standard drinks     Types: 1 Glasses of wine, 1 Cans of beer, 1 Shots of liquor per week     Comment: weekends-socially   • Drug use: No   • Sexual activity: Defer       Family History   Problem Relation Age of Onset   • Brain cancer Mother    • Heart disease Father    • Brain cancer Father    • Stomach cancer Maternal Grandmother    • Cancer Maternal Grandfather    • No Known Problems Paternal Grandmother    • No Known Problems Paternal Grandfather        Review of Systems   Constitution: Negative for chills, fever and malaise/fatigue.   Cardiovascular: Positive for dyspnea on exertion. Negative for chest pain, leg swelling, near-syncope, orthopnea, palpitations, paroxysmal nocturnal  "dyspnea and syncope.   Respiratory: Negative for cough and shortness of breath.    Musculoskeletal: Negative for joint pain, joint swelling and myalgias.   Gastrointestinal: Negative for abdominal pain, diarrhea, melena, nausea and vomiting.   Genitourinary: Negative for frequency and hematuria.   Neurological: Negative for light-headedness, numbness, paresthesias and seizures.   Allergic/Immunologic: Negative.    All other systems reviewed and are negative.      Allergies   Allergen Reactions   • No Known Drug Allergy          Current Outpatient Medications:   •  acetaminophen (TYLENOL) 500 MG tablet, Take 500 mg by mouth every 6 (six) hours as needed for mild pain (1-3)., Disp: , Rfl:   •  apixaban (ELIQUIS) 5 MG tablet tablet, Take 1 tablet by mouth Every 12 (Twelve) Hours., Disp: 60 tablet, Rfl: 11  •  b complex-C-folic acid 1 MG capsule, Take 1 capsule by mouth Daily., Disp: , Rfl:   •  digoxin (LANOXIN) 125 MCG tablet, Take 1 tablet by mouth Daily., Disp: 90 tablet, Rfl: 3  •  loratadine (Claritin) 10 MG tablet, Take 10 mg by mouth Daily., Disp: , Rfl:   •  metaxalone (SKELAXIN) 800 MG tablet, Take 800 mg by mouth As Needed for Muscle Spasms., Disp: , Rfl:   •  metoprolol succinate XL (TOPROL-XL) 25 MG 24 hr tablet, Take 0.5 tablets by mouth Daily., Disp: 30 tablet, Rfl: 5  •  zoledronic acid (RECLAST) 5 MG/100ML solution injection, Infuse 5 mg into a venous catheter 1 (one) time. 1 time yearly, Disp: , Rfl:       Objective:     Vitals:    11/24/20 1404 11/24/20 1410   BP: 110/68 110/68   BP Location: Right arm Left arm   Patient Position: Sitting Sitting   Pulse: 57    Resp: 18    SpO2: 98%    Weight: 58.2 kg (128 lb 3.2 oz)    Height: 166.4 cm (65.5\")      Body mass index is 21.01 kg/m².    PHYSICAL EXAM:    Constitutional:       General: Not in acute distress.     Appearance: Well-developed. Not diaphoretic.   Eyes:      Pupils: Pupils are equal, round, and reactive to light.   HENT:      Head: " Normocephalic and atraumatic.   Neck:      Thyroid: No thyromegaly.      Vascular: No JVD.   Pulmonary:      Effort: Pulmonary effort is normal. No respiratory distress.      Breath sounds: Normal breath sounds.   Cardiovascular:      Normal rate. Irregular rhythm.   Pulses:     Intact distal pulses.   Abdominal:      General: Bowel sounds are normal. There is no distension.      Palpations: Abdomen is soft. There is no hepatomegaly or splenomegaly.      Tenderness: There is no abdominal tenderness.   Musculoskeletal: Normal range of motion.   Skin:     General: Skin is warm and dry.      Findings: No erythema.   Neurological:      Mental Status: Alert and oriented to person, place, and time.   Psychiatric:         Behavior: Behavior normal.         Judgment: Judgment normal.           ECG 12 Lead    Date/Time: 11/24/2020 2:17 PM  Performed by: Teresita Leone APRN  Authorized by: Teresita Leone APRN   Comparison: compared with previous ECG from 9/25/2020  Similar to previous ECG  Rhythm: atrial fibrillation  Rate: normal  BPM: 82  Other findings: non-specific ST-T wave changes    Clinical impression: abnormal EKG  Comments: Indication: Atrial fibrillation              Assessment:       Diagnosis Plan   1. Nonrheumatic mitral valve regurgitation  Adult Transesophageal Echo (ROBBIE) W/ Cont if Necessary Per Protocol   2. S/P MVR (mitral valve repair)  Adult Transesophageal Echo (ROBBIE) W/ Cont if Necessary Per Protocol   3. Paroxysmal atrial fibrillation (CMS/HCC)  ECG 12 Lead   4. NICM (nonischemic cardiomyopathy) (CMS/HCC)     5. Mitral valve stenosis, unspecified etiology  Adult Transesophageal Echo (ROBBIE) W/ Cont if Necessary Per Protocol     Orders Placed This Encounter   Procedures   • ECG 12 Lead     This order was created via procedure documentation   • Adult Transesophageal Echo (ROBBIE) W/ Cont if Necessary Per Protocol     Standing Status:   Future     Standing Expiration Date:   11/25/2021      Scheduling Instructions:      With Dr. Lange     Order Specific Question:   What type of sedation does the patient require?     Answer:   Moderate Sedation     Order Specific Question:   Reason for exam?     Answer:   Valvular Disease          Plan:       1.  Status post mitral valve repair.  Echocardiogram today pending.        2.  Paroxysmal atrial fibrillation.  She remains in atrial fibrillation with a controlled rate.  She is anticoagulated on Eliquis.      3.  Nonischemic cardiomyopathy.  She denies any symptoms of acute heart failure.  Guideline directed medical therapy has been difficult due to low blood pressure.  She has actually not been taking her Toprol.  I am going to have her resume at 12.5 mg of Toprol.  She will keep me updated as far as her symptoms go.  I have also asked her to continue checking her blood pressures.  When she follows up with Dr. Hand next month, hopefully we can optimize her medical therapy even more.      Overall I think she is stable.  She denies any symptoms of angina or heart failure.  I discussed the plan of care with Dr. Hand.  We are unsure the exact etiology of her nonischemic cardiomyopathy.  Cardiac catheterization from March 2019 revealed only mild coronary disease.  We will continue optimizing medical therapy as she tolerates.      She will follow-up with Dr. Hand on 12/16/2020 or sooner if needed.      Addendum: I reviewed the echo results with Dr. Hand.  It appears she has worsening mitral stenosis, and we are recommending a ROBBIE for further evaluation.        As always, it has been a pleasure to participate in your patient's care.      Sincerely,         RICHA Christine

## 2020-11-25 ENCOUNTER — TELEPHONE (OUTPATIENT)
Dept: CARDIOLOGY | Facility: CLINIC | Age: 70
End: 2020-11-25

## 2020-12-03 ENCOUNTER — TRANSCRIBE ORDERS (OUTPATIENT)
Dept: CARDIOLOGY | Facility: CLINIC | Age: 70
End: 2020-12-03

## 2020-12-03 DIAGNOSIS — Z13.6 SCREENING FOR CARDIOVASCULAR CONDITION: Primary | ICD-10-CM

## 2020-12-03 DIAGNOSIS — Z01.810 PREPROCEDURAL CARDIOVASCULAR EXAMINATION: ICD-10-CM

## 2020-12-04 ENCOUNTER — TRANSCRIBE ORDERS (OUTPATIENT)
Dept: SLEEP MEDICINE | Facility: HOSPITAL | Age: 70
End: 2020-12-04

## 2020-12-04 ENCOUNTER — LAB (OUTPATIENT)
Dept: LAB | Facility: HOSPITAL | Age: 70
End: 2020-12-04

## 2020-12-04 DIAGNOSIS — Z01.810 PREPROCEDURAL CARDIOVASCULAR EXAMINATION: ICD-10-CM

## 2020-12-04 DIAGNOSIS — Z13.6 SCREENING FOR CARDIOVASCULAR CONDITION: ICD-10-CM

## 2020-12-04 DIAGNOSIS — Z01.818 OTHER SPECIFIED PRE-OPERATIVE EXAMINATION: Primary | ICD-10-CM

## 2020-12-04 LAB
ANION GAP SERPL CALCULATED.3IONS-SCNC: 9.2 MMOL/L (ref 5–15)
BASOPHILS # BLD AUTO: 0.04 10*3/MM3 (ref 0–0.2)
BASOPHILS NFR BLD AUTO: 0.8 % (ref 0–1.5)
BUN SERPL-MCNC: 17 MG/DL (ref 8–23)
BUN/CREAT SERPL: 24.3 (ref 7–25)
CALCIUM SPEC-SCNC: 9.9 MG/DL (ref 8.6–10.5)
CHLORIDE SERPL-SCNC: 102 MMOL/L (ref 98–107)
CO2 SERPL-SCNC: 25.8 MMOL/L (ref 22–29)
CREAT SERPL-MCNC: 0.7 MG/DL (ref 0.57–1)
DEPRECATED RDW RBC AUTO: 43.1 FL (ref 37–54)
EOSINOPHIL # BLD AUTO: 0.1 10*3/MM3 (ref 0–0.4)
EOSINOPHIL NFR BLD AUTO: 1.9 % (ref 0.3–6.2)
ERYTHROCYTE [DISTWIDTH] IN BLOOD BY AUTOMATED COUNT: 12.6 % (ref 12.3–15.4)
GFR SERPL CREATININE-BSD FRML MDRD: 83 ML/MIN/1.73
GLUCOSE SERPL-MCNC: 103 MG/DL (ref 65–99)
HCT VFR BLD AUTO: 41.6 % (ref 34–46.6)
HGB BLD-MCNC: 13.9 G/DL (ref 12–15.9)
IMM GRANULOCYTES # BLD AUTO: 0.01 10*3/MM3 (ref 0–0.05)
IMM GRANULOCYTES NFR BLD AUTO: 0.2 % (ref 0–0.5)
LYMPHOCYTES # BLD AUTO: 2.36 10*3/MM3 (ref 0.7–3.1)
LYMPHOCYTES NFR BLD AUTO: 45 % (ref 19.6–45.3)
MCH RBC QN AUTO: 31.6 PG (ref 26.6–33)
MCHC RBC AUTO-ENTMCNC: 33.4 G/DL (ref 31.5–35.7)
MCV RBC AUTO: 94.5 FL (ref 79–97)
MONOCYTES # BLD AUTO: 0.33 10*3/MM3 (ref 0.1–0.9)
MONOCYTES NFR BLD AUTO: 6.3 % (ref 5–12)
NEUTROPHILS NFR BLD AUTO: 2.41 10*3/MM3 (ref 1.7–7)
NEUTROPHILS NFR BLD AUTO: 45.8 % (ref 42.7–76)
NRBC BLD AUTO-RTO: 0 /100 WBC (ref 0–0.2)
PLATELET # BLD AUTO: 181 10*3/MM3 (ref 140–450)
PMV BLD AUTO: 10.2 FL (ref 6–12)
POTASSIUM SERPL-SCNC: 4.4 MMOL/L (ref 3.5–5.2)
RBC # BLD AUTO: 4.4 10*6/MM3 (ref 3.77–5.28)
SODIUM SERPL-SCNC: 137 MMOL/L (ref 136–145)
WBC # BLD AUTO: 5.25 10*3/MM3 (ref 3.4–10.8)

## 2020-12-04 PROCEDURE — 36415 COLL VENOUS BLD VENIPUNCTURE: CPT

## 2020-12-04 PROCEDURE — 85025 COMPLETE CBC W/AUTO DIFF WBC: CPT

## 2020-12-04 PROCEDURE — 80048 BASIC METABOLIC PNL TOTAL CA: CPT

## 2020-12-05 ENCOUNTER — LAB (OUTPATIENT)
Dept: LAB | Facility: HOSPITAL | Age: 70
End: 2020-12-05

## 2020-12-05 DIAGNOSIS — Z01.818 OTHER SPECIFIED PRE-OPERATIVE EXAMINATION: ICD-10-CM

## 2020-12-05 PROCEDURE — U0004 COV-19 TEST NON-CDC HGH THRU: HCPCS

## 2020-12-05 PROCEDURE — C9803 HOPD COVID-19 SPEC COLLECT: HCPCS

## 2020-12-07 LAB — SARS-COV-2 RNA RESP QL NAA+PROBE: NOT DETECTED

## 2020-12-08 ENCOUNTER — HOSPITAL ENCOUNTER (OUTPATIENT)
Dept: CARDIOLOGY | Facility: HOSPITAL | Age: 70
Discharge: HOME | End: 2020-12-08
Admitting: NURSE PRACTITIONER

## 2020-12-08 ENCOUNTER — TELEPHONE (OUTPATIENT)
Dept: CARDIOLOGY | Facility: CLINIC | Age: 70
End: 2020-12-08

## 2020-12-08 VITALS
HEART RATE: 82 BPM | BODY MASS INDEX: 21.3 KG/M2 | RESPIRATION RATE: 16 BRPM | DIASTOLIC BLOOD PRESSURE: 59 MMHG | SYSTOLIC BLOOD PRESSURE: 128 MMHG | HEIGHT: 65 IN | OXYGEN SATURATION: 99 % | WEIGHT: 127.87 LBS

## 2020-12-08 DIAGNOSIS — I34.0 NONRHEUMATIC MITRAL VALVE REGURGITATION: ICD-10-CM

## 2020-12-08 DIAGNOSIS — I05.0 MITRAL VALVE STENOSIS, UNSPECIFIED ETIOLOGY: ICD-10-CM

## 2020-12-08 DIAGNOSIS — Z98.890 S/P MVR (MITRAL VALVE REPAIR): ICD-10-CM

## 2020-12-08 DIAGNOSIS — I42.8 NON-ISCHEMIC CARDIOMYOPATHY (HCC): Primary | ICD-10-CM

## 2020-12-08 LAB
ASCENDING AORTA: 2.9 CM
BH CV ECHO MEAS - ASC AORTA: 2.9 CM
BH CV ECHO MEAS - BSA(HAYCOCK): 1.6 M^2
BH CV ECHO MEAS - BSA: 1.6 M^2
BH CV ECHO MEAS - BZI_BMI: 21 KILOGRAMS/M^2
BH CV ECHO MEAS - BZI_METRIC_HEIGHT: 166 CM
BH CV ECHO MEAS - BZI_METRIC_WEIGHT: 58 KG
BH CV ECHO MEAS - MV DEC TIME: 138 SEC
BH CV ECHO MEAS - MV MAX PG: 9.2 MMHG
BH CV ECHO MEAS - MV MEAN PG: 5 MMHG
BH CV ECHO MEAS - MV V2 MAX: 151.7 CM/SEC
BH CV ECHO MEAS - MV V2 MEAN: 96.9 CM/SEC
BH CV ECHO MEAS - MV V2 VTI: 47.7 CM
BH CV ECHO MEAS - TR MAX VEL: 216 CM/SEC
BH CV VAS BP LEFT ARM: NORMAL MMHG
LV EF 2D ECHO EST: 25 %
SINUS: 3 CM
STJ: 2.5 CM

## 2020-12-08 PROCEDURE — 25010000002 FENTANYL CITRATE (PF) 100 MCG/2ML SOLUTION: Performed by: INTERNAL MEDICINE

## 2020-12-08 PROCEDURE — 93325 DOPPLER ECHO COLOR FLOW MAPG: CPT | Performed by: INTERNAL MEDICINE

## 2020-12-08 PROCEDURE — 93321 DOPPLER ECHO F-UP/LMTD STD: CPT

## 2020-12-08 PROCEDURE — 93321 DOPPLER ECHO F-UP/LMTD STD: CPT | Performed by: INTERNAL MEDICINE

## 2020-12-08 PROCEDURE — 93312 ECHO TRANSESOPHAGEAL: CPT

## 2020-12-08 PROCEDURE — 99152 MOD SED SAME PHYS/QHP 5/>YRS: CPT

## 2020-12-08 PROCEDURE — 25010000002 MIDAZOLAM PER 1 MG: Performed by: INTERNAL MEDICINE

## 2020-12-08 PROCEDURE — 93325 DOPPLER ECHO COLOR FLOW MAPG: CPT

## 2020-12-08 PROCEDURE — 93312 ECHO TRANSESOPHAGEAL: CPT | Performed by: INTERNAL MEDICINE

## 2020-12-08 RX ORDER — SPIRONOLACTONE 25 MG/1
12.5 TABLET ORAL DAILY
Qty: 15 TABLET | Refills: 0 | Status: SHIPPED | OUTPATIENT
Start: 2020-12-08 | End: 2020-12-18 | Stop reason: SDUPTHER

## 2020-12-08 RX ORDER — SODIUM CHLORIDE 9 MG/ML
INJECTION, SOLUTION INTRAVENOUS
Status: COMPLETED | OUTPATIENT
Start: 2020-12-08 | End: 2020-12-08

## 2020-12-08 RX ORDER — FENTANYL CITRATE 50 UG/ML
INJECTION, SOLUTION INTRAMUSCULAR; INTRAVENOUS
Status: COMPLETED | OUTPATIENT
Start: 2020-12-08 | End: 2020-12-08

## 2020-12-08 RX ORDER — SPIRONOLACTONE 25 MG/1
12.5 TABLET ORAL DAILY
Qty: 45 TABLET | Refills: 3 | Status: SHIPPED | OUTPATIENT
Start: 2020-12-08 | End: 2021-10-21

## 2020-12-08 RX ORDER — LIDOCAINE HYDROCHLORIDE 20 MG/ML
SOLUTION OROPHARYNGEAL
Status: COMPLETED | OUTPATIENT
Start: 2020-12-08 | End: 2020-12-08

## 2020-12-08 RX ORDER — MIDAZOLAM HYDROCHLORIDE 1 MG/ML
INJECTION INTRAMUSCULAR; INTRAVENOUS
Status: COMPLETED | OUTPATIENT
Start: 2020-12-08 | End: 2020-12-08

## 2020-12-08 RX ADMIN — FENTANYL CITRATE 50 MCG: 50 INJECTION, SOLUTION INTRAMUSCULAR; INTRAVENOUS at 11:20

## 2020-12-08 RX ADMIN — MIDAZOLAM HYDROCHLORIDE 1 MG: 1 INJECTION, SOLUTION INTRAMUSCULAR; INTRAVENOUS at 11:28

## 2020-12-08 RX ADMIN — MIDAZOLAM HYDROCHLORIDE 4 MG: 1 INJECTION, SOLUTION INTRAMUSCULAR; INTRAVENOUS at 11:20

## 2020-12-08 RX ADMIN — SODIUM CHLORIDE 50 ML/HR: 9 INJECTION, SOLUTION INTRAVENOUS at 10:59

## 2020-12-08 RX ADMIN — LIDOCAINE HYDROCHLORIDE 10 ML: 20 SOLUTION ORAL; TOPICAL at 11:00

## 2020-12-08 NOTE — DISCHARGE INSTRUCTIONS
Transesophageal Echocardiogram  Transesophageal echocardiogram (ROBBIE) is a test that uses sound waves to take pictures of your heart. ROBBIE is done by passing a flexible tube down the esophagus. The esophagus is the tube that carries food from the throat to the stomach. The pictures give detailed images of your heart. This can help your doctor see if there are problems with your heart.  What happens before the procedure?  Staying hydrated  Follow instructions from your doctor about hydration, which may include:  · Up to 3 hours before the procedure - you may continue to drink clear liquids, such as:  ? Water.  ? Clear fruit juice.  ? Black coffee.  ? Plain tea.    Eating and drinking  Follow instructions from your doctor about eating and drinking, which may include:  · 8 hours before the procedure - stop eating heavy meals or foods such as meat, fried foods, or fatty foods.  · 6 hours before the procedure - stop eating light meals or foods, such as toast or cereal.  · 6 hours before the procedure - stop drinking milk or drinks that contain milk.  · 3 hours before the procedure - stop drinking clear liquids.  General instructions  · You will need to take out any dentures or retainers.  · Plan to have someone take you home from the hospital or clinic.  · If you will be going home right after the procedure, plan to have someone with you for 24 hours.  · Ask your doctor about:  ? Changing or stopping your normal medicines. This is important if you take diabetes medicines or blood thinners.  ? Taking over-the-counter medicines, vitamins, herbs, and supplements.  ? Taking medicines such as aspirin and ibuprofen. These medicines can thin your blood. Do not take these medicines unless your doctor tells you to take them.  What happens during the procedure?  · To lower your risk of infection, your doctors will wash or clean their hands.  · An IV will be put into one of your veins.  · You will be given a medicine to help you  relax (sedative).  · A medicine may be sprayed or gargled. This numbs the back of your throat.  · Your blood pressure, heart rate, and breathing will be watched.  · You may be asked to lay on your left side.  · A bite block will be placed in your mouth. This keeps you from biting the tube.  · The tip of the ROBBIE probe will be placed into the back of your mouth.  · You will be asked to swallow.  · Your doctor will take pictures of your heart.  · The probe and bite block will be taken out.  The procedure may vary among doctors and hospitals.  What happens after the procedure?    · Your blood pressure, heart rate, breathing rate, and blood oxygen level will be watched until the medicines you were given have worn off.  · When you first wake up, your throat may feel sore and numb. This will get better over time. You will not be allowed to eat or drink until the numbness has gone away.  · Do not drive for 24 hours if you were given a medicine to help you relax.  Summary  · ROBBIE is a test that uses sound waves to take pictures of your heart.  · You will be given a medicine to help you relax.  · Do not drive for 24 hours if you were given a medicine to help you relax.  This information is not intended to replace advice given to you by your health care provider. Make sure you discuss any questions you have with your health care provider.  Document Revised: 09/06/2019 Document Reviewed: 03/21/2018  Elsevier Patient Education © 2020 [employer]

## 2020-12-08 NOTE — TELEPHONE ENCOUNTER
I reviewed her ROBBIE results with Dr. Hand.  We are going to optimize her medical therapy as tolerated.  I spoke with her.  Her BP has been ranging from 80//81.  I would like to try her on 12.5 mg of Aldactone daily.  She will have a BMP next week.

## 2020-12-09 ENCOUNTER — TELEPHONE (OUTPATIENT)
Dept: CARDIOLOGY | Facility: CLINIC | Age: 70
End: 2020-12-09

## 2020-12-09 NOTE — TELEPHONE ENCOUNTER
S/W pt re: pharm not having her Spironolactone.     I s/w pharm and they stated pt insurance wouldn't pay for the 15 tabs only her mail order. Pharmacist advised if she has her goodrx discount, the Rx would only $5.99 for 15 tabs. I advised to go ahead and fill it and I would call ad advise pt.    I advised pt of price w/ GoodRx card and told her I had pharm go ahead and fill it if she wanted to go and pick it up. Pt verbalized appreciation and understanding.    ANKUR Boss

## 2020-12-18 ENCOUNTER — TELEMEDICINE (OUTPATIENT)
Dept: CARDIOLOGY | Facility: CLINIC | Age: 70
End: 2020-12-18

## 2020-12-18 VITALS
HEIGHT: 65 IN | BODY MASS INDEX: 21.66 KG/M2 | WEIGHT: 130 LBS | SYSTOLIC BLOOD PRESSURE: 105 MMHG | HEART RATE: 52 BPM | DIASTOLIC BLOOD PRESSURE: 77 MMHG

## 2020-12-18 DIAGNOSIS — I34.0 NON-RHEUMATIC MITRAL REGURGITATION: Primary | ICD-10-CM

## 2020-12-18 DIAGNOSIS — I48.0 PAROXYSMAL ATRIAL FIBRILLATION (HCC): ICD-10-CM

## 2020-12-18 DIAGNOSIS — I42.8 NICM (NONISCHEMIC CARDIOMYOPATHY) (HCC): ICD-10-CM

## 2020-12-18 PROCEDURE — 99214 OFFICE O/P EST MOD 30 MIN: CPT | Performed by: INTERNAL MEDICINE

## 2020-12-18 NOTE — PROGRESS NOTES
She is here for a telemedicine visit via video that she is consented to.    She is a 70-year-old lady who has initially had paroxysmal A. fib we found that she had severe mitral insufficiency no significant coronary disease preserved LV function at that time and in July 2017 she went to the [facility] where she had a ring annuloplasty and an Drew stitch placed in her mitral valve.  She did well until July 2020 when she showed up with shortness of breath elevated proBNP and reduced LV function with an EF of about 40% we have evaluated her there was some question about her mitral valve and whether the mitral stenosis could be causing the problem there is been a little bit of a gradient since the stitch.  She underwent a ROBBIE recently by my partner Dr. Tobias and that revealed really it did not seem like severe MR or MS but the feeling after that is that she has a nonischemic cardiomyopathy so organ to concentrate on guideline directed medical therapy.     She is doing well is not having shortness of breath no PND no orthopnea no edema she walk about of for an hour today and a felt okay she otherwise is doing well and I think he has class I heart failure symptoms    It is our feeling that she has developed a nonischemic cardiomyopathy it is moderate she is to class I symptoms she is always had lowish blood pressures we have her on a mineralocorticoid receptor blocker and a little bit of Toprol but I do not think her blood pressure will permit us giving her more medication than that on that I have her come back and see Teresita in 6 months and then see me in a year I had a long discussion with her about the vaccine and encouraged her to get it.  I spent 15 minutes on the phone and another 10 minutes of charting

## 2020-12-21 ENCOUNTER — HOSPITAL ENCOUNTER (OUTPATIENT)
Dept: GENERAL RADIOLOGY | Facility: HOSPITAL | Age: 70
Discharge: HOME | End: 2020-12-21

## 2020-12-21 ENCOUNTER — LAB (OUTPATIENT)
Dept: LAB | Facility: HOSPITAL | Age: 70
End: 2020-12-21

## 2020-12-21 DIAGNOSIS — R06.2 WHEEZING: ICD-10-CM

## 2020-12-21 DIAGNOSIS — R05.9 COUGH: ICD-10-CM

## 2020-12-21 DIAGNOSIS — I48.91 ATRIAL FIBRILLATION, UNSPECIFIED TYPE (HCC): ICD-10-CM

## 2020-12-21 DIAGNOSIS — I42.8 NON-ISCHEMIC CARDIOMYOPATHY (HCC): ICD-10-CM

## 2020-12-21 DIAGNOSIS — D48.1 ABDOMINAL FIBROMATOSIS: Primary | ICD-10-CM

## 2020-12-21 DIAGNOSIS — I50.9 CONGESTIVE HEART FAILURE, UNSPECIFIED HF CHRONICITY, UNSPECIFIED HEART FAILURE TYPE (HCC): ICD-10-CM

## 2020-12-21 LAB
ANION GAP SERPL CALCULATED.3IONS-SCNC: 10.9 MMOL/L (ref 5–15)
BUN SERPL-MCNC: 15 MG/DL (ref 8–23)
BUN/CREAT SERPL: 18.3 (ref 7–25)
CALCIUM SPEC-SCNC: 9.6 MG/DL (ref 8.6–10.5)
CHLORIDE SERPL-SCNC: 102 MMOL/L (ref 98–107)
CO2 SERPL-SCNC: 25.1 MMOL/L (ref 22–29)
CREAT SERPL-MCNC: 0.82 MG/DL (ref 0.57–1)
DIGOXIN SERPL-MCNC: 0.8 NG/ML (ref 0.6–1.2)
GFR SERPL CREATININE-BSD FRML MDRD: 69 ML/MIN/1.73
GLUCOSE SERPL-MCNC: 101 MG/DL (ref 65–99)
NT-PROBNP SERPL-MCNC: 2029 PG/ML (ref 0–900)
POTASSIUM SERPL-SCNC: 4.4 MMOL/L (ref 3.5–5.2)
SODIUM SERPL-SCNC: 138 MMOL/L (ref 136–145)

## 2020-12-21 PROCEDURE — 80048 BASIC METABOLIC PNL TOTAL CA: CPT

## 2020-12-21 PROCEDURE — 80162 ASSAY OF DIGOXIN TOTAL: CPT

## 2020-12-21 PROCEDURE — 83880 ASSAY OF NATRIURETIC PEPTIDE: CPT

## 2020-12-21 PROCEDURE — 36415 COLL VENOUS BLD VENIPUNCTURE: CPT

## 2020-12-21 PROCEDURE — 71046 X-RAY EXAM CHEST 2 VIEWS: CPT

## 2021-01-14 ENCOUNTER — TELEPHONE (OUTPATIENT)
Dept: GASTROENTEROLOGY | Facility: CLINIC | Age: 71
End: 2021-01-14

## 2021-01-14 NOTE — TELEPHONE ENCOUNTER
Last scope 2010, no record available-- no personal hx of polyps-- family hx of polyps-- Eliquis-- medications:     acetaminophen (TYLENOL) 500 MG tablet     apixaban (ELIQUIS) 5 MG tablet tablet     b complex-C-folic acid 1 MG capsule     digoxin (LANOXIN) 125 MCG tablet     loratadine (Claritin) 10 MG tablet     metaxalone (SKELAXIN) 800 MG tablet     metoprolol succinate XL (TOPROL-XL) 25 MG 24 hr tablet     spironolactone (ALDACTONE) 25 MG tablet        OA form scanned into media

## 2021-01-25 ENCOUNTER — PREP FOR SURGERY (OUTPATIENT)
Dept: OTHER | Facility: HOSPITAL | Age: 71
End: 2021-01-25

## 2021-01-25 ENCOUNTER — TELEPHONE (OUTPATIENT)
Dept: GASTROENTEROLOGY | Facility: CLINIC | Age: 71
End: 2021-01-25

## 2021-01-25 DIAGNOSIS — Z12.11 ENCOUNTER FOR SCREENING FOR MALIGNANT NEOPLASM OF COLON: Primary | ICD-10-CM

## 2021-01-25 NOTE — TELEPHONE ENCOUNTER
Good afternoon,    Dr Tsai has placed an order for this patient to have a colonoscopy and would like for her to hold her Eliquis for 48 hours prior, please let us know if this is ok.    Thank you

## 2021-02-02 PROBLEM — Z12.11 ENCOUNTER FOR SCREENING FOR MALIGNANT NEOPLASM OF COLON: Status: ACTIVE | Noted: 2021-02-02

## 2021-03-09 ENCOUNTER — IMMUNIZATION (OUTPATIENT)
Dept: VACCINE CLINIC | Facility: HOSPITAL | Age: 71
End: 2021-03-09

## 2021-03-09 PROCEDURE — 91300 HC SARSCOV02 VAC 30MCG/0.3ML IM: CPT | Performed by: INTERNAL MEDICINE

## 2021-03-09 PROCEDURE — 0001A: CPT | Performed by: INTERNAL MEDICINE

## 2021-03-23 ENCOUNTER — TRANSCRIBE ORDERS (OUTPATIENT)
Dept: LAB | Facility: HOSPITAL | Age: 71
End: 2021-03-23

## 2021-03-23 ENCOUNTER — OFFICE VISIT (OUTPATIENT)
Dept: ORTHOPEDIC SURGERY | Facility: CLINIC | Age: 71
End: 2021-03-23

## 2021-03-23 ENCOUNTER — TELEPHONE (OUTPATIENT)
Dept: CARDIOLOGY | Facility: CLINIC | Age: 71
End: 2021-03-23

## 2021-03-23 DIAGNOSIS — M16.11 PRIMARY OSTEOARTHRITIS OF RIGHT HIP: ICD-10-CM

## 2021-03-23 DIAGNOSIS — M25.551 RIGHT HIP PAIN: Primary | ICD-10-CM

## 2021-03-23 DIAGNOSIS — Z01.818 OTHER SPECIFIED PRE-OPERATIVE EXAMINATION: Primary | ICD-10-CM

## 2021-03-23 PROCEDURE — 99214 OFFICE O/P EST MOD 30 MIN: CPT | Performed by: ORTHOPAEDIC SURGERY

## 2021-03-23 PROCEDURE — 73502 X-RAY EXAM HIP UNI 2-3 VIEWS: CPT | Performed by: ORTHOPAEDIC SURGERY

## 2021-03-23 RX ORDER — MELOXICAM 15 MG/1
15 TABLET ORAL ONCE
Status: CANCELLED | OUTPATIENT
Start: 2021-05-10 | End: 2021-03-23

## 2021-03-23 RX ORDER — PREGABALIN 75 MG/1
150 CAPSULE ORAL ONCE
Status: CANCELLED | OUTPATIENT
Start: 2021-05-10 | End: 2021-03-23

## 2021-03-23 RX ORDER — CEFAZOLIN SODIUM 2 G/100ML
2 INJECTION, SOLUTION INTRAVENOUS ONCE
Status: CANCELLED | OUTPATIENT
Start: 2021-05-10 | End: 2021-03-23

## 2021-03-23 RX ORDER — VANCOMYCIN HYDROCHLORIDE 1 G/200ML
1000 INJECTION, SOLUTION INTRAVENOUS ONCE
Status: CANCELLED | OUTPATIENT
Start: 2021-05-10 | End: 2021-03-23

## 2021-03-23 NOTE — TELEPHONE ENCOUNTER
Patient called and is needing surgery clearance for a procedure with MD Jasmeet Graham.  She did a video call with you in December and seen Teresita in office in November 2020.      Does she need to be seen first or is she okay to have the clearance?    Thank you  Alie SWENSON

## 2021-03-23 NOTE — PROGRESS NOTES
Patient: Rasheeda Balderas  YOB: 1950 70 y.o. female  Medical Record Number: [id number]    Chief Complaints: right groin pain    History of Present Illness:Rasheeda Balderas is a 70 y.o. female who presents with complaints of intermittent severe groin pain.  She will have episodes of stabbing pain where she has to slowly get up from a seated position or hip is essentially locked.  Is happening more frequently than before.  Is beginning to limit her basic activities of daily living.    Allergies:   Allergies   Allergen Reactions   • No Known Drug Allergy        Medications:   Current Outpatient Medications   Medication Sig Dispense Refill   • acetaminophen (TYLENOL) 500 MG tablet Take 500 mg by mouth every 6 (six) hours as needed for mild pain (1-3).     • apixaban (ELIQUIS) 5 MG tablet tablet Take 1 tablet by mouth Every 12 (Twelve) Hours. 60 tablet 11   • b complex-C-folic acid 1 MG capsule Take 1 capsule by mouth Daily.     • digoxin (LANOXIN) 125 MCG tablet Take 1 tablet by mouth Daily. 90 tablet 3   • loratadine (Claritin) 10 MG tablet Take 10 mg by mouth Daily.     • metaxalone (SKELAXIN) 800 MG tablet Take 800 mg by mouth As Needed for Muscle Spasms.     • metoprolol succinate XL (TOPROL-XL) 25 MG 24 hr tablet Take 0.5 tablets by mouth Daily. 30 tablet 5   • spironolactone (ALDACTONE) 25 MG tablet Take 0.5 tablets by mouth Daily. 45 tablet 3   • zoledronic acid (RECLAST) 5 MG/100ML solution injection Infuse 5 mg into a venous catheter 1 (one) time. 1 time yearly       No current facility-administered medications for this visit.         The following portions of the patient's history were reviewed and updated as appropriate: allergies, current medications, past family history, past medical history, past social history, past surgical history and problem list.    Review of Systems:   A 14 point review of systems was performed. All systems negative except pertinent positives/negative listed in HPI  above    Physical Exam:   There were no vitals filed for this visit.    General: A and O x 3, ASA, NAD    SCLERA:    Normal    DENTITION:   Normal   Hip:  right    LEG ALIGNMENT:     Neutral        LEG LENGTH DISCREPANCY   :    none    GAIT:     Antalgic    SKIN:     No abnormality    RANGE OF MOTION:     Limited by joint irritability    STRENGTH:     Limited by joint irratibility    DISTAL PULSES:    Paplable    DISTAL SENSATION :   Intact    LYMPHATICS:     No   lymphadenopathy    OTHER:          +   Stinchfeld test      -    log roll      -   Tenderness to palpation trochanteric bursa       Radiology:  Xrays 2views right  (AP bilateral hips, and lateral of the hip) ordered and reviewed for evaluation of hip pain  demonstrating  advanced, end-satge osteoarthritis with bone on bone articluation, periarticular osteophytes, and subchondral cysts.    Assessment/Plan: Right hip advanced end-stage osteoarthritis.  Continuation of conservative management vs. TUNDE discussed.  The patient wishes to proceed with total hip replacement.  At this point the patient has failed the full gamut of conservative treatment and stating complete understanding of the risks/benefits/ anternatives wishes to proceed with surgical treatment.    Risk and benefits of surgery were reviewed.  Including, but not limited to, blood clots, anesthesia risk, infection, leg length discrepancy, fracture, skin/leg numbness, failure of the implant, need for future surgeries, continued pain, hematoma, need for transfusion, and death, among others.  The patient understands and wishes to proceed.     The spectrum of treatment options were discussed with the patient in detail including both the nonoperative and operative treatment modalities and their respective risks and benefits.  After thorough discussion, the patient has elected to undergo surgical treatment.  The details of the surgical procedure were explained including the location of probable incisions  and a description of the likely implants to be used.  Models and diagrams were used as educational resources. The patient understands the likely convalescence after surgery, as well as the rehabilitation required.  We thoroughly discussed the risks, benefits, and alternatives to surgery.  The risks include but are not limited to the risk of infection, joint stiffness, blood clots (including DVT and/or pulmonary embolus along with the risk of death), neurologic and/or vascular injury, fracture, dislocation, nonunion, malunion, need for further surgery including hardware failure requiring revision, and continued pain.  It was explained that if tissue has been repaired or reconstructed, there is also a chance of failure which may require further management.  Following the completion of the discussion, the patient expressed understanding of this planned course of care, all their questions were answered and consent will be obtained preoperatively.    Operative Plan: Anterior approach Total Hip Replacement 23 hr stay        Jasmeet Graham MD  3/23/2021

## 2021-03-24 PROBLEM — M16.11 PRIMARY OSTEOARTHRITIS OF RIGHT HIP: Status: ACTIVE | Noted: 2021-03-24

## 2021-03-30 ENCOUNTER — IMMUNIZATION (OUTPATIENT)
Dept: VACCINE CLINIC | Facility: HOSPITAL | Age: 71
End: 2021-03-30

## 2021-03-30 PROCEDURE — 0002A: CPT | Performed by: INTERNAL MEDICINE

## 2021-03-30 PROCEDURE — 91300 HC SARSCOV02 VAC 30MCG/0.3ML IM: CPT | Performed by: INTERNAL MEDICINE

## 2021-03-31 ENCOUNTER — TRANSCRIBE ORDERS (OUTPATIENT)
Dept: ADMINISTRATIVE | Facility: HOSPITAL | Age: 71
End: 2021-03-31

## 2021-03-31 DIAGNOSIS — M81.0 OSTEOPOROSIS OF MULTIPLE SITES: Primary | ICD-10-CM

## 2021-04-05 ENCOUNTER — LAB (OUTPATIENT)
Dept: LAB | Facility: HOSPITAL | Age: 71
End: 2021-04-05

## 2021-04-05 DIAGNOSIS — Z01.818 OTHER SPECIFIED PRE-OPERATIVE EXAMINATION: ICD-10-CM

## 2021-04-05 PROCEDURE — U0005 INFEC AGEN DETEC AMPLI PROBE: HCPCS

## 2021-04-05 PROCEDURE — C9803 HOPD COVID-19 SPEC COLLECT: HCPCS

## 2021-04-05 PROCEDURE — U0004 COV-19 TEST NON-CDC HGH THRU: HCPCS

## 2021-04-06 LAB — SARS-COV-2 RNA RESP QL NAA+PROBE: NOT DETECTED

## 2021-04-06 RX ORDER — ZOLEDRONIC ACID 5 MG/100ML
5 INJECTION, SOLUTION INTRAVENOUS ONCE
Status: CANCELLED
Start: 2021-04-09 | End: 2021-04-09

## 2021-04-06 RX ORDER — ZOLEDRONIC ACID 5 MG/100ML
5 INJECTION, SOLUTION INTRAVENOUS ONCE
Status: CANCELLED
Start: 2021-04-06 | End: 2021-04-06

## 2021-04-07 ENCOUNTER — ANESTHESIA (OUTPATIENT)
Dept: GASTROENTEROLOGY | Facility: HOSPITAL | Age: 71
End: 2021-04-07

## 2021-04-07 ENCOUNTER — ANESTHESIA EVENT (OUTPATIENT)
Dept: GASTROENTEROLOGY | Facility: HOSPITAL | Age: 71
End: 2021-04-07

## 2021-04-07 ENCOUNTER — HOSPITAL ENCOUNTER (OUTPATIENT)
Facility: HOSPITAL | Age: 71
Setting detail: HOSPITAL OUTPATIENT SURGERY
Discharge: HOME | End: 2021-04-07
Attending: INTERNAL MEDICINE | Admitting: INTERNAL MEDICINE

## 2021-04-07 VITALS
TEMPERATURE: 97.9 F | OXYGEN SATURATION: 100 % | BODY MASS INDEX: 21.83 KG/M2 | HEIGHT: 65 IN | SYSTOLIC BLOOD PRESSURE: 96 MMHG | DIASTOLIC BLOOD PRESSURE: 75 MMHG | WEIGHT: 131 LBS | RESPIRATION RATE: 16 BRPM | HEART RATE: 74 BPM

## 2021-04-07 PROCEDURE — S0260 H&P FOR SURGERY: HCPCS | Performed by: INTERNAL MEDICINE

## 2021-04-07 PROCEDURE — G0105 COLORECTAL SCRN; HI RISK IND: HCPCS | Performed by: INTERNAL MEDICINE

## 2021-04-07 PROCEDURE — 25010000002 PROPOFOL 10 MG/ML EMULSION: Performed by: ANESTHESIOLOGY

## 2021-04-07 RX ORDER — PROPOFOL 10 MG/ML
VIAL (ML) INTRAVENOUS CONTINUOUS PRN
Status: DISCONTINUED | OUTPATIENT
Start: 2021-04-07 | End: 2021-04-07 | Stop reason: SURG

## 2021-04-07 RX ORDER — LIDOCAINE HYDROCHLORIDE 20 MG/ML
INJECTION, SOLUTION INFILTRATION; PERINEURAL AS NEEDED
Status: DISCONTINUED | OUTPATIENT
Start: 2021-04-07 | End: 2021-04-07 | Stop reason: SURG

## 2021-04-07 RX ORDER — PROPOFOL 10 MG/ML
VIAL (ML) INTRAVENOUS AS NEEDED
Status: DISCONTINUED | OUTPATIENT
Start: 2021-04-07 | End: 2021-04-07 | Stop reason: SURG

## 2021-04-07 RX ORDER — SODIUM CHLORIDE, SODIUM LACTATE, POTASSIUM CHLORIDE, CALCIUM CHLORIDE 600; 310; 30; 20 MG/100ML; MG/100ML; MG/100ML; MG/100ML
30 INJECTION, SOLUTION INTRAVENOUS CONTINUOUS PRN
Status: DISCONTINUED | OUTPATIENT
Start: 2021-04-07 | End: 2021-04-07 | Stop reason: HOSPADM

## 2021-04-07 RX ADMIN — PROPOFOL 125 MCG/KG/MIN: 10 INJECTION, EMULSION INTRAVENOUS at 09:51

## 2021-04-07 RX ADMIN — PROPOFOL 75 MG: 10 INJECTION, EMULSION INTRAVENOUS at 09:51

## 2021-04-07 RX ADMIN — LIDOCAINE HYDROCHLORIDE 60 MG: 20 INJECTION, SOLUTION INFILTRATION; PERINEURAL at 09:49

## 2021-04-07 RX ADMIN — SODIUM CHLORIDE, POTASSIUM CHLORIDE, SODIUM LACTATE AND CALCIUM CHLORIDE 30 ML/HR: 600; 310; 30; 20 INJECTION, SOLUTION INTRAVENOUS at 09:44

## 2021-04-07 RX ADMIN — PROPOFOL 50 MG: 10 INJECTION, EMULSION INTRAVENOUS at 09:58

## 2021-04-07 NOTE — ANESTHESIA PREPROCEDURE EVALUATION
Anesthesia Evaluation     Patient summary reviewed                Airway   No difficulty expected  Dental      Pulmonary    Cardiovascular     ECG reviewed  Rhythm: irregular    (+) valvular problems/murmurs (s/p MVR), CAD, dysrhythmias,       Neuro/Psych  GI/Hepatic/Renal/Endo      Musculoskeletal     Abdominal    Substance History      OB/GYN          Other   arthritis,                    Anesthesia Plan    ASA 3     MAC       Anesthetic plan, all risks, benefits, and alternatives have been provided, discussed and informed consent has been obtained with: patient.

## 2021-04-07 NOTE — H&P
[facility]  Pre Procedure History & Physical    Chief Complaint:   Screening for colon cancer-family history of polyps    Subjective     HPI:   71yo here today for colonoscopy.  Pt reports family history of polyps (sister).   Last exam 2010- nml per pt.    Last dose eliquis 4/4    Past Medical History:   Past Medical History:   Diagnosis Date   • Arthritis    • CAD (coronary artery disease)    • Cholelithiasis    • Heartburn    • Mitral valve prolapse syndrome    • Myocardial infarction (CMS/HCC)     pt is not aware of history of MI   • Osteoporosis        Past Surgical History:  Past Surgical History:   Procedure Laterality Date   • CARDIAC CATHETERIZATION N/A 3/7/2019    Procedure: Right and Left Heart Cath;  Surgeon: Cam Hand MD;  Location:  [facility];  Service: Cardiology   • CARDIAC CATHETERIZATION N/A 3/7/2019    Procedure: Coronary angiography;  Surgeon: Cam Hand MD;  Location:  [facility] CATH [facility];  Service: Cardiology   • CARDIAC CATHETERIZATION N/A 3/7/2019    Procedure: Left ventriculography;  Surgeon: Cam Hand MD;  Location:  [facility] CATH INVASIVE [facility];  Service: Cardiology   • LAPAROSCOPIC CHOLECYSTECTOMY W/ CHOLANGIOGRAPHY     • MITRAL VALVE REPLACEMENT     • SHOULDER SURGERY Left        Family History:  Family History   Problem Relation Age of Onset   • Brain cancer Mother    • Heart disease Father    • Brain cancer Father    • Stomach cancer Maternal Grandmother    • Cancer Maternal Grandfather    • No Known Problems Paternal Grandmother    • No Known Problems Paternal Grandfather        Social History:   reports that she quit smoking about 12 years ago. She has never used smokeless tobacco. She reports current alcohol use of about 3.0 standard drinks of alcohol per week. She reports that she does not use drugs.    Medications:   Medications Prior to Admission   Medication Sig Dispense Refill Last Dose   • acetaminophen  "(TYLENOL) 500 MG tablet Take 500 mg by mouth every 6 (six) hours as needed for mild pain (1-3).   4/6/2021 at Unknown time   • b complex-C-folic acid 1 MG capsule Take 1 capsule by mouth Daily.   4/6/2021 at Unknown time   • digoxin (LANOXIN) 125 MCG tablet Take 1 tablet by mouth Daily. 90 tablet 3 4/7/2021 at Unknown time   • loratadine (Claritin) 10 MG tablet Take 10 mg by mouth Daily.   4/6/2021 at Unknown time   • metaxalone (SKELAXIN) 800 MG tablet Take 800 mg by mouth As Needed for Muscle Spasms.   Past Week at Unknown time   • metoprolol succinate XL (TOPROL-XL) 25 MG 24 hr tablet Take 0.5 tablets by mouth Daily. 30 tablet 5 4/7/2021 at Unknown time   • spironolactone (ALDACTONE) 25 MG tablet Take 0.5 tablets by mouth Daily. 45 tablet 3 4/6/2021 at Unknown time   • apixaban (ELIQUIS) 5 MG tablet tablet Take 1 tablet by mouth Every 12 (Twelve) Hours. 60 tablet 11 4/4/2021   • zoledronic acid (RECLAST) 5 MG/100ML solution injection Infuse 5 mg into a venous catheter 1 (one) time. 1 time yearly          Allergies:  No known drug allergy    ROS:    Pertinent items are noted in HPI, all other systems reviewed and negative     Objective     Blood pressure 109/48, pulse 69, temperature 97.9 °F (36.6 °C), temperature source Oral, resp. rate 13, height 165.1 cm (65\"), weight 59.4 kg (131 lb), SpO2 98 %, not currently breastfeeding.    Physical Exam   Constitutional: Pt is oriented to person, place, and time and well-developed, well-nourished, and in no distress.   Mouth/Throat: Oropharynx is clear and moist.   Neck: Normal range of motion.   Cardiovascular: Normal rate, regular rhythm    Pulmonary/Chest: Effort normal    Abdominal: Soft. Nontender  Skin: Skin is warm and dry.   Psychiatric: Mood, memory, affect and judgment normal.     Assessment/Plan     Diagnosis:  Screening for colon cancer-family history of polyps    Anticipated Surgical Procedure:  colonoscopy    The risks, benefits, and alternatives of this " procedure have been discussed with the patient or the responsible party- the patient understands and agrees to proceed.

## 2021-04-07 NOTE — ANESTHESIA POSTPROCEDURE EVALUATION
Patient: Rasheeda Balderas    Procedure Summary     Date: 04/07/21 Room / Location:  [facility] /  [facility]    Anesthesia Start: 0947 Anesthesia Stop: 1014    Procedure: COLONOSCOPY TO CECUM (N/A ) Diagnosis:       Encounter for screening for malignant neoplasm of colon      (Encounter for screening for malignant neoplasm of colon [Z12.11])    Surgeons: Mary Tsai MD Provider: Matthew Musa MD    Anesthesia Type: MAC ASA Status: 3          Anesthesia Type: MAC    Vitals  Vitals Value Taken Time   BP 96/75 04/07/21 1032   Temp     Pulse 74 04/07/21 1032   Resp 16 04/07/21 1032   SpO2 100 % 04/07/21 1032           Post Anesthesia Care and Evaluation    Patient location during evaluation: PACU  Patient participation: complete - patient participated  Level of consciousness: awake  Pain score: 1  Pain management: adequate  Airway patency: patent  Anesthetic complications: No anesthetic complications  PONV Status: none  Cardiovascular status: acceptable  Respiratory status: acceptable  Hydration status: acceptable

## 2021-04-09 ENCOUNTER — HOSPITAL ENCOUNTER (OUTPATIENT)
Dept: INFUSION THERAPY | Facility: HOSPITAL | Age: 71
Discharge: HOME | End: 2021-04-09
Admitting: INTERNAL MEDICINE

## 2021-04-09 VITALS
DIASTOLIC BLOOD PRESSURE: 73 MMHG | HEART RATE: 76 BPM | SYSTOLIC BLOOD PRESSURE: 102 MMHG | TEMPERATURE: 96.9 F | OXYGEN SATURATION: 97 % | RESPIRATION RATE: 18 BRPM

## 2021-04-09 DIAGNOSIS — M81.0 AGE RELATED OSTEOPOROSIS, UNSPECIFIED PATHOLOGICAL FRACTURE PRESENCE: Primary | ICD-10-CM

## 2021-04-09 PROCEDURE — 25010000002 ZOLEDRONIC ACID 5 MG/100ML SOLUTION: Performed by: INTERNAL MEDICINE

## 2021-04-09 PROCEDURE — 96365 THER/PROPH/DIAG IV INF INIT: CPT

## 2021-04-09 RX ORDER — ZOLEDRONIC ACID 5 MG/100ML
5 INJECTION, SOLUTION INTRAVENOUS ONCE
Status: CANCELLED
Start: 2021-04-09 | End: 2021-04-09

## 2021-04-09 RX ORDER — ZOLEDRONIC ACID 5 MG/100ML
5 INJECTION, SOLUTION INTRAVENOUS ONCE
Status: COMPLETED | OUTPATIENT
Start: 2021-04-09 | End: 2021-04-09

## 2021-04-09 RX ADMIN — ZOLEDRONIC ACID 5 MG: 0.05 INJECTION, SOLUTION INTRAVENOUS at 10:29

## 2021-04-09 NOTE — PATIENT INSTRUCTIONS
Zoledronic Acid Injection (Paget's Disease, Osteoporosis)  What is this medicine?  ZOLEDRONIC ACID (JETT le benjamin ik AS id) slows calcium loss from bones. It treats Paget's disease and osteoporosis. It may be used in other people at risk for bone loss.  This medicine may be used for other purposes; ask your health care provider or pharmacist if you have questions.  COMMON BRAND NAME(S): Reclast, Zometa  What should I tell my health care provider before I take this medicine?  They need to know if you have any of these conditions:  · bleeding disorder  · cancer  · dental disease  · kidney disease  · low levels of calcium in the blood  · low red blood cell counts  · lung or breathing disease (asthma)  · receiving steroids like dexamethasone or prednisone  · an unusual or allergic reaction to zoledronic acid, other medicines, foods, dyes, or preservatives  · pregnant or trying to get pregnant  · breast-feeding  How should I use this medicine?  This drug is injected into a vein. It is given by a health care provider in a hospital or clinic setting.  A special MedGuide will be given to you before each treatment. Be sure to read this information carefully each time.  Talk to your health care provider about the use of this drug in children. Special care may be needed.  Overdosage: If you think you have taken too much of this medicine contact a poison control center or emergency room at once.  NOTE: This medicine is only for you. Do not share this medicine with others.  What if I miss a dose?  Keep appointments for follow-up doses. It is important not to miss your dose. Call your health care provider if you are unable to keep an appointment.  What may interact with this medicine?  · certain antibiotics given by injection  · NSAIDs, medicines for pain and inflammation, like ibuprofen or naproxen  · some diuretics like bumetanide, furosemide  · teriparatide  This list may not describe all possible interactions. Give your health  care provider a list of all the medicines, herbs, non-prescription drugs, or dietary supplements you use. Also tell them if you smoke, drink alcohol, or use illegal drugs. Some items may interact with your medicine.  What should I watch for while using this medicine?  Visit your health care provider for regular checks on your progress. It may be some time before you see the benefit from this drug.  Some people who take this drug have severe bone, joint, or muscle pain. This drug may also increase your risk for jaw problems or a broken thigh bone. Tell your health care provider right away if you have severe pain in your jaw, bones, joints, or muscles. Tell you health care provider if you have any pain that does not go away or that gets worse.  You should make sure you get enough calcium and vitamin D while you are taking this drug. Discuss the foods you eat and the vitamins you take with your health care provider.  You may need blood work done while you are taking this drug.  Tell your dentist and dental surgeon that you are taking this drug. You should not have major dental surgery while on this drug. See your dentist to have a dental exam and fix any dental problems before starting this drug. Take good care of your teeth while on this drug. Make sure you see your dentist for regular follow-up appointments.  What side effects may I notice from receiving this medicine?  Side effects that you should report to your doctor or health care provider as soon as possible:  · allergic reactions (skin rash, itching or hives; swelling of the face, lips, or tongue)  · bone pain  · infection (fever, chills, cough, sore throat, pain or trouble passing urine)  · jaw pain, especially after dental work  · joint pain  · kidney injury (trouble passing urine or change in the amount of urine)  · low calcium levels (fast heartbeat; muscle cramps or pain; pain, tingling, or numbness in the hands or feet; seizures)  · low red blood cell  counts (trouble breathing; feeling faint; lightheaded, falls; unusually weak or tired)  · muscle pain  · palpitations  · redness, blistering, peeling, or loosening of the skin, including inside the mouth  Side effects that usually do not require medical attention (report to your doctor or health care provider if they continue or are bothersome):  · diarrhea  · eye irritation, itching, or pain  · fever  · general ill feeling or flu-like symptoms  · headache  · increase in blood pressure  · nausea  · pain, redness, or irritation at site where injected  · stomach pain  · upset stomach  This list may not describe all possible side effects. Call your doctor for medical advice about side effects. You may report side effects to FDA at 8-194-JMF-4734.  Where should I keep my medicine?  This drug is given in a hospital or clinic. It will not be stored at home.  NOTE: This sheet is a summary. It may not cover all possible information. If you have questions about this medicine, talk to your doctor, pharmacist, or health care provider.  © 2021 [employer]vier/Gold Standard (2020-10-05 11:46:18)

## 2021-04-22 ENCOUNTER — TRANSCRIBE ORDERS (OUTPATIENT)
Dept: PREADMISSION TESTING | Facility: HOSPITAL | Age: 71
End: 2021-04-22

## 2021-04-22 DIAGNOSIS — Z01.818 OTHER SPECIFIED PRE-OPERATIVE EXAMINATION: Primary | ICD-10-CM

## 2021-04-29 ENCOUNTER — PRE-ADMISSION TESTING (OUTPATIENT)
Dept: PREADMISSION TESTING | Facility: HOSPITAL | Age: 71
End: 2021-04-29

## 2021-04-29 VITALS
HEART RATE: 87 BPM | OXYGEN SATURATION: 98 % | TEMPERATURE: 97.8 F | HEIGHT: 65 IN | RESPIRATION RATE: 18 BRPM | SYSTOLIC BLOOD PRESSURE: 113 MMHG | BODY MASS INDEX: 22.33 KG/M2 | DIASTOLIC BLOOD PRESSURE: 66 MMHG | WEIGHT: 134 LBS

## 2021-04-29 DIAGNOSIS — M16.11 PRIMARY OSTEOARTHRITIS OF RIGHT HIP: ICD-10-CM

## 2021-04-29 LAB
ANION GAP SERPL CALCULATED.3IONS-SCNC: 10.6 MMOL/L (ref 5–15)
BILIRUB UR QL STRIP: NEGATIVE
BUN SERPL-MCNC: 17 MG/DL (ref 8–23)
BUN/CREAT SERPL: 20.7 (ref 7–25)
CALCIUM SPEC-SCNC: 9.1 MG/DL (ref 8.6–10.5)
CHLORIDE SERPL-SCNC: 105 MMOL/L (ref 98–107)
CLARITY UR: CLEAR
CO2 SERPL-SCNC: 23.4 MMOL/L (ref 22–29)
COLOR UR: ABNORMAL
CREAT SERPL-MCNC: 0.82 MG/DL (ref 0.57–1)
DEPRECATED RDW RBC AUTO: 43.2 FL (ref 37–54)
ERYTHROCYTE [DISTWIDTH] IN BLOOD BY AUTOMATED COUNT: 12 % (ref 12.3–15.4)
GFR SERPL CREATININE-BSD FRML MDRD: 69 ML/MIN/1.73
GLUCOSE SERPL-MCNC: 163 MG/DL (ref 65–99)
GLUCOSE UR STRIP-MCNC: NEGATIVE MG/DL
HCT VFR BLD AUTO: 42.1 % (ref 34–46.6)
HGB BLD-MCNC: 14.2 G/DL (ref 12–15.9)
HGB UR QL STRIP.AUTO: NEGATIVE
KETONES UR QL STRIP: ABNORMAL
LEUKOCYTE ESTERASE UR QL STRIP.AUTO: NEGATIVE
MCH RBC QN AUTO: 33.2 PG (ref 26.6–33)
MCHC RBC AUTO-ENTMCNC: 33.7 G/DL (ref 31.5–35.7)
MCV RBC AUTO: 98.4 FL (ref 79–97)
NITRITE UR QL STRIP: NEGATIVE
PH UR STRIP.AUTO: <=5 [PH] (ref 5–8)
PLATELET # BLD AUTO: 173 10*3/MM3 (ref 140–450)
PMV BLD AUTO: 10.1 FL (ref 6–12)
POTASSIUM SERPL-SCNC: 4.7 MMOL/L (ref 3.5–5.2)
PROT UR QL STRIP: NEGATIVE
RBC # BLD AUTO: 4.28 10*6/MM3 (ref 3.77–5.28)
SODIUM SERPL-SCNC: 139 MMOL/L (ref 136–145)
SP GR UR STRIP: 1.03 (ref 1–1.03)
UROBILINOGEN UR QL STRIP: ABNORMAL
WBC # BLD AUTO: 4.69 10*3/MM3 (ref 3.4–10.8)

## 2021-04-29 PROCEDURE — 81003 URINALYSIS AUTO W/O SCOPE: CPT

## 2021-04-29 PROCEDURE — 80048 BASIC METABOLIC PNL TOTAL CA: CPT

## 2021-04-29 PROCEDURE — 85027 COMPLETE CBC AUTOMATED: CPT

## 2021-04-29 PROCEDURE — 93010 ELECTROCARDIOGRAM REPORT: CPT | Performed by: INTERNAL MEDICINE

## 2021-04-29 PROCEDURE — 36415 COLL VENOUS BLD VENIPUNCTURE: CPT

## 2021-04-29 PROCEDURE — 93005 ELECTROCARDIOGRAM TRACING: CPT

## 2021-04-29 RX ORDER — CHLORHEXIDINE GLUCONATE 500 MG/1
1 CLOTH TOPICAL TAKE AS DIRECTED
COMMUNITY
End: 2021-05-11 | Stop reason: HOSPADM

## 2021-04-29 RX ORDER — RNA INGREDIENT BNT-162B2 0.23 G/1.8ML
0.3 INJECTION, SUSPENSION INTRAMUSCULAR ONCE
Status: ON HOLD | COMMUNITY
End: 2021-05-10

## 2021-04-29 ASSESSMENT — HOOS JR
HOOS JR SCORE: 52.965
HOOS JR SCORE: 12

## 2021-04-30 LAB — QT INTERVAL: 372 MS

## 2021-05-03 ENCOUNTER — TELEPHONE (OUTPATIENT)
Dept: ORTHOPEDIC SURGERY | Facility: CLINIC | Age: 71
End: 2021-05-03

## 2021-05-03 NOTE — TELEPHONE ENCOUNTER
In review of her preadmission testing, patient was found to have an abnormal EKG.  She sees Dr. Hand.  This EKG is different as compared to the one done in November of last year.  I have made her an appointment to see Dr. Hand's nurse practitioner on Thursday at 3:00 for cardiac clearance.  Patient is scheduled to see Savana Gil at 2:10 on Thursday for her H&P.  Have advised her to go ahead and come to our office a little earlier so she can get to Dr. Hand's office in time.

## 2021-05-06 ENCOUNTER — OFFICE VISIT (OUTPATIENT)
Dept: ORTHOPEDIC SURGERY | Facility: CLINIC | Age: 71
End: 2021-05-06

## 2021-05-06 ENCOUNTER — TELEPHONE (OUTPATIENT)
Dept: CARDIOLOGY | Facility: CLINIC | Age: 71
End: 2021-05-06

## 2021-05-06 VITALS — WEIGHT: 134.04 LBS | BODY MASS INDEX: 22.33 KG/M2 | HEIGHT: 65 IN | TEMPERATURE: 96 F

## 2021-05-06 DIAGNOSIS — M16.11 PRIMARY OSTEOARTHRITIS OF RIGHT HIP: Primary | ICD-10-CM

## 2021-05-06 PROCEDURE — S0260 H&P FOR SURGERY: HCPCS | Performed by: NURSE PRACTITIONER

## 2021-05-06 NOTE — TELEPHONE ENCOUNTER
Appointment is scheduled today for surgical clearance for upcoming orthopedic surgery.  Evidently there was concern regarding the preoperative EKG.  I reviewed the EKG with Dr. Hand and compared it to her EKG from November.  She remains in atrial fibrillation with a well-controlled ventricular rate.  She has some nonspecific ST-T changes related to digoxin.  Her EKG is otherwise very stable.  I called and spoke with the patient.  She is feeling great with no cardiac complaints.  We do not feel it is necessary for her to be seen in the office.    Mary, I would be happy to send a new clearance letter.  Is this sufficient?  Or are you requiring an actual office visit?

## 2021-05-06 NOTE — TELEPHONE ENCOUNTER
Pt L/M re: needing to know when she should hold her Eliquis.  Please advise of recommendations.    ANKUR Zamarripa

## 2021-05-06 NOTE — H&P
Patient: Rasheeda Balderas    Date of Admission: 5/10/2021    YOB: 1950    Medical Record Number: [id number]    Admitting Physician: Dr. Jasmeet Graham    Reason for Admission: End Stage Right Hip OA    History of Present Illness: 70 y.o. female presents with severe end stage hip osteoarthritis which has not been responsive to the full compliment of conservative measures. Despite conservative attempts, there is still severe, constant activity limiting hip pain. Given the severity of the pain, the patient has elected to proceed with hip replacement.    Allergies: No Known Allergies      Current Medications:  Home Medications:    Current Outpatient Medications on File Prior to Visit   Medication Sig   • acetaminophen (TYLENOL) 500 MG tablet Take 500 mg by mouth every 6 (six) hours as needed for mild pain (1-3).   • apixaban (ELIQUIS) 5 MG tablet tablet Take 1 tablet by mouth Every 12 (Twelve) Hours.   • b complex-C-folic acid 1 MG capsule Take 1 capsule by mouth Daily.   • Chlorhexidine Gluconate Cloth 2 % pads Apply  topically. AS DIRECTED PREOP   • digoxin (LANOXIN) 125 MCG tablet Take 1 tablet by mouth Daily.   • loratadine (Claritin) 10 MG tablet Take 10 mg by mouth Daily.   • metaxalone (SKELAXIN) 800 MG tablet Take 800 mg by mouth As Needed for Muscle Spasms.   • metoprolol succinate XL (TOPROL-XL) 25 MG 24 hr tablet Take 0.5 tablets by mouth Daily.   • mupirocin (Bactroban Nasal) 2 % nasal ointment into the nostril(s) as directed by provider. AS DIRECTED PREOP   • spironolactone (ALDACTONE) 25 MG tablet Take 0.5 tablets by mouth Daily.   • zoledronic acid (RECLAST) 5 MG/100ML solution injection Infuse 5 mg into a venous catheter 1 (one) time. 1 time yearly   • COVID-19 mRNA Vaccine, Pfizer, (COVID-19 mRNA Vacc, PFIZER,) 30 MCG/0.3ML suspension Inject 0.3 mL into the appropriate muscle as directed by prescriber 1 (One) Time. 2ND DOSE     Current Facility-Administered Medications on File Prior to Visit    Medication   • Chlorhexidine Gluconate 2 % pads 2 each     PRN Meds:.    PMH:  Past Medical History:   Diagnosis Date   • Arthritis    • Atrial fibrillation (CMS/HCC)    • Bone spur    • CAD (coronary artery disease)    • Heartburn    • Mitral valve prolapse syndrome    • Myocardial infarction (CMS/HCC)     pt is not aware of history of MI   • Osteoporosis    • PONV (postoperative nausea and vomiting)    • Right hip pain         PSURGH:  Past Surgical History:   Procedure Laterality Date   • CARDIAC CATHETERIZATION N/A 3/7/2019    Procedure: Right and Left Heart Cath;  Surgeon: Cam Hand MD;  Location: [facility] CATH INVASIVE LOCATION;  Service: Cardiology   • CARDIAC CATHETERIZATION N/A 3/7/2019    Procedure: Coronary angiography;  Surgeon: Cam Hand MD;  [facility];  Service: Cardiology   • CARDIAC CATHETERIZATION N/A 3/7/2019    Procedure: Left ventriculography;  Surgeon: Cam Hand MD;  Location: [facility] CATH INVASIVE LOCATION;  Service: Cardiology   • COLONOSCOPY N/A 2021    Procedure: COLONOSCOPY TO CECUM;  Surgeon: Mary Tsai MD;  Location: [facility] ENDOSCOPY;  Service: Gastroenterology;  Laterality: N/A;  PRE: SCREENING, FAMILY HX COLON POLYPS  POST: DIVERTICULOSIS, HEMORRHOIDS   • LAPAROSCOPIC CHOLECYSTECTOMY W/ CHOLANGIOGRAPHY     • MITRAL VALVE REPLACEMENT     • SHOULDER SURGERY Left        SocialHx:  Social History     Occupational History   • Occupation: professor   Tobacco Use   • Smoking status: Former Smoker     Packs/day: 0.50     Types: Cigarettes     Quit date:      Years since quittin.3   • Smokeless tobacco: Never Used   Vaping Use   • Vaping Use: Never used   Substance and Sexual Activity   • Alcohol use: Yes     Alcohol/week: 3.0 standard drinks     Types: 1 Glasses of wine, 1 Cans of beer, 1 Shots of liquor per week     Comment: 8 PER WEEK   • Drug use: No   • Sexual activity: Defer      Social History     Social History  "Narrative   • Not on file       FamHx:  Family History   Problem Relation Age of Onset   • Brain cancer Mother    • Heart disease Father    • Brain cancer Father    • Stomach cancer Maternal Grandmother    • Cancer Maternal Grandfather    • No Known Problems Paternal Grandmother    • No Known Problems Paternal Grandfather    • Malig Hyperthermia Neg Hx          Review of Systems:   A 14 point review of systems was performed, pertinent positives discussed above, all other systems are negative    Physical Exam: 70 y.o. female  Vital Signs :   Vitals:    05/06/21 1401   Temp: 96 °F (35.6 °C)   Weight: 60.8 kg (134 lb 0.6 oz)   Height: 165.1 cm (65\")   PainSc:   4   PainLoc: Hip     General: Alert and Oriented x 3, No acute distress.  Psych: mood and affect appropriate; recent and remote memory intact  Eyes: conjunctiva clear; pupils equally round and reactive, sclera nonicteric  CV: no peripheral edema  Resp: normal respiratory effort  Skin: no rashes or wounds; normal turgor  Musculosketetal; pain with hip range of motion. Positive stinchfeld test. No trochanteric  Tenderness.  Vascular: palpable distal pulses    Labs:    Pre-Admission Testing on 04/29/2021   Component Date Value Ref Range Status   • Glucose 04/29/2021 163* 65 - 99 mg/dL Final   • BUN 04/29/2021 17  8 - 23 mg/dL Final   • Creatinine 04/29/2021 0.82  0.57 - 1.00 mg/dL Final   • Sodium 04/29/2021 139  136 - 145 mmol/L Final   • Potassium 04/29/2021 4.7  3.5 - 5.2 mmol/L Final   • Chloride 04/29/2021 105  98 - 107 mmol/L Final   • CO2 04/29/2021 23.4  22.0 - 29.0 mmol/L Final   • Calcium 04/29/2021 9.1  8.6 - 10.5 mg/dL Final   • eGFR Non African Amer 04/29/2021 69  >60 mL/min/1.73 Final   • BUN/Creatinine Ratio 04/29/2021 20.7  7.0 - 25.0 Final   • Anion Gap 04/29/2021 10.6  5.0 - 15.0 mmol/L Final   • WBC 04/29/2021 4.69  3.40 - 10.80 10*3/mm3 Final   • RBC 04/29/2021 4.28  3.77 - 5.28 10*6/mm3 Final   • Hemoglobin 04/29/2021 14.2  12.0 - 15.9 g/dL " Final   • Hematocrit 04/29/2021 42.1  34.0 - 46.6 % Final   • MCV 04/29/2021 98.4* 79.0 - 97.0 fL Final   • MCH 04/29/2021 33.2* 26.6 - 33.0 pg Final   • MCHC 04/29/2021 33.7  31.5 - 35.7 g/dL Final   • RDW 04/29/2021 12.0* 12.3 - 15.4 % Final   • RDW-SD 04/29/2021 43.2  37.0 - 54.0 fl Final   • MPV 04/29/2021 10.1  6.0 - 12.0 fL Final   • Platelets 04/29/2021 173  140 - 450 10*3/mm3 Final   • QT Interval 04/29/2021 372  ms Final   • Color, UA 04/29/2021 Dark Yellow* Yellow, Straw Final   • Appearance, UA 04/29/2021 Clear  Clear Final   • pH, UA 04/29/2021 <=5.0  5.0 - 8.0 Final   • Specific Gravity, UA 04/29/2021 1.027  1.005 - 1.030 Final   • Glucose, UA 04/29/2021 Negative  Negative Final   • Ketones, UA 04/29/2021 Trace* Negative Final   • Bilirubin, UA 04/29/2021 Negative  Negative Final   • Blood, UA 04/29/2021 Negative  Negative Final   • Protein, UA 04/29/2021 Negative  Negative Final   • Leuk Esterase, UA 04/29/2021 Negative  Negative Final   • Nitrite, UA 04/29/2021 Negative  Negative Final   • Urobilinogen, UA 04/29/2021 0.2 E.U./dL  0.2 - 1.0 E.U./dL Final     Xrays:  Xrays AP pelvis and a lateral of the Right hip were reviewed demonstrating  End stage hip OA with bone on bone articulation, subchondral cysts and periarticular osteophytes.    Assessment:  End-stage Right hip osteoarthritis. Conservative measures have failed.      Plan:  The plan is to proceed with Right Total Hip Replacement. The patient voiced understanding of the risks, benefits, and alternative forms of treatment that were discussed with Dr Graham at the time of scheduling.  Patient plan on going home with home health next day, we will resume her Eliquis postoperatively that she takes for SUSI Cintronkin, APRN  5/6/2021

## 2021-05-07 ENCOUNTER — LAB (OUTPATIENT)
Dept: LAB | Facility: HOSPITAL | Age: 71
End: 2021-05-07

## 2021-05-07 DIAGNOSIS — Z01.818 OTHER SPECIFIED PRE-OPERATIVE EXAMINATION: ICD-10-CM

## 2021-05-07 PROCEDURE — C9803 HOPD COVID-19 SPEC COLLECT: HCPCS

## 2021-05-07 PROCEDURE — U0005 INFEC AGEN DETEC AMPLI PROBE: HCPCS

## 2021-05-07 PROCEDURE — U0004 COV-19 TEST NON-CDC HGH THRU: HCPCS

## 2021-05-08 LAB — SARS-COV-2 ORF1AB RESP QL NAA+PROBE: NOT DETECTED

## 2021-05-09 ENCOUNTER — ANESTHESIA EVENT (OUTPATIENT)
Dept: PERIOP | Facility: HOSPITAL | Age: 71
End: 2021-05-09

## 2021-05-10 ENCOUNTER — APPOINTMENT (OUTPATIENT)
Dept: GENERAL RADIOLOGY | Facility: HOSPITAL | Age: 71
End: 2021-05-10

## 2021-05-10 ENCOUNTER — HOSPITAL ENCOUNTER (OUTPATIENT)
Facility: HOSPITAL | Age: 71
Discharge: HOME HEALTH | End: 2021-05-11
Attending: ORTHOPAEDIC SURGERY | Admitting: ORTHOPAEDIC SURGERY

## 2021-05-10 ENCOUNTER — ANESTHESIA (OUTPATIENT)
Dept: PERIOP | Facility: HOSPITAL | Age: 71
End: 2021-05-10

## 2021-05-10 DIAGNOSIS — Z96.641 STATUS POST TOTAL REPLACEMENT OF RIGHT HIP: Primary | ICD-10-CM

## 2021-05-10 DIAGNOSIS — M16.11 PRIMARY OSTEOARTHRITIS OF RIGHT HIP: ICD-10-CM

## 2021-05-10 LAB
ABO GROUP BLD: NORMAL
BLD GP AB SCN SERPL QL: NEGATIVE
RH BLD: POSITIVE
T&S EXPIRATION DATE: NORMAL

## 2021-05-10 PROCEDURE — G0378 HOSPITAL OBSERVATION PER HR: HCPCS

## 2021-05-10 PROCEDURE — 76000 FLUOROSCOPY <1 HR PHYS/QHP: CPT

## 2021-05-10 PROCEDURE — 25010000002 ONDANSETRON PER 1 MG: Performed by: NURSE ANESTHETIST, CERTIFIED REGISTERED

## 2021-05-10 PROCEDURE — 25010000002 PROPOFOL 10 MG/ML EMULSION: Performed by: NURSE ANESTHETIST, CERTIFIED REGISTERED

## 2021-05-10 PROCEDURE — 73501 X-RAY EXAM HIP UNI 1 VIEW: CPT

## 2021-05-10 PROCEDURE — 86900 BLOOD TYPING SEROLOGIC ABO: CPT | Performed by: ORTHOPAEDIC SURGERY

## 2021-05-10 PROCEDURE — 25010000002 MORPHINE PER 10 MG: Performed by: ORTHOPAEDIC SURGERY

## 2021-05-10 PROCEDURE — 25010000002 KETOROLAC TROMETHAMINE PER 15 MG: Performed by: ORTHOPAEDIC SURGERY

## 2021-05-10 PROCEDURE — 25010000002 FENTANYL CITRATE (PF) 100 MCG/2ML SOLUTION: Performed by: NURSE ANESTHETIST, CERTIFIED REGISTERED

## 2021-05-10 PROCEDURE — 86901 BLOOD TYPING SEROLOGIC RH(D): CPT | Performed by: ORTHOPAEDIC SURGERY

## 2021-05-10 PROCEDURE — 25010000002 ROPIVACAINE PER 1 MG: Performed by: ORTHOPAEDIC SURGERY

## 2021-05-10 PROCEDURE — 25010000002 NEOSTIGMINE 5 MG/10ML SOLUTION: Performed by: NURSE ANESTHETIST, CERTIFIED REGISTERED

## 2021-05-10 PROCEDURE — C1776 JOINT DEVICE (IMPLANTABLE): HCPCS | Performed by: ORTHOPAEDIC SURGERY

## 2021-05-10 PROCEDURE — 25010000002 EPINEPHRINE 30 MG/30ML SOLUTION: Performed by: ORTHOPAEDIC SURGERY

## 2021-05-10 PROCEDURE — 25010000003 CEFAZOLIN IN DEXTROSE 2-4 GM/100ML-% SOLUTION: Performed by: NURSE PRACTITIONER

## 2021-05-10 PROCEDURE — 25010000002 DEXAMETHASONE PER 1 MG: Performed by: NURSE ANESTHETIST, CERTIFIED REGISTERED

## 2021-05-10 PROCEDURE — 27130 TOTAL HIP ARTHROPLASTY: CPT | Performed by: NURSE PRACTITIONER

## 2021-05-10 PROCEDURE — 25010000002 VANCOMYCIN PER 500 MG: Performed by: ORTHOPAEDIC SURGERY

## 2021-05-10 PROCEDURE — 25010000002 FENTANYL CITRATE (PF) 50 MCG/ML SOLUTION: Performed by: NURSE ANESTHETIST, CERTIFIED REGISTERED

## 2021-05-10 PROCEDURE — C1889 IMPLANT/INSERT DEVICE, NOC: HCPCS | Performed by: ORTHOPAEDIC SURGERY

## 2021-05-10 PROCEDURE — 25010000002 HYDROMORPHONE PER 4 MG: Performed by: NURSE ANESTHETIST, CERTIFIED REGISTERED

## 2021-05-10 PROCEDURE — 97110 THERAPEUTIC EXERCISES: CPT

## 2021-05-10 PROCEDURE — 25010000003 CEFAZOLIN IN DEXTROSE 2-4 GM/100ML-% SOLUTION: Performed by: ORTHOPAEDIC SURGERY

## 2021-05-10 PROCEDURE — 86850 RBC ANTIBODY SCREEN: CPT | Performed by: ORTHOPAEDIC SURGERY

## 2021-05-10 PROCEDURE — 73501 X-RAY EXAM HIP UNI 1 VIEW: CPT | Performed by: ORTHOPAEDIC SURGERY

## 2021-05-10 PROCEDURE — 27130 TOTAL HIP ARTHROPLASTY: CPT | Performed by: ORTHOPAEDIC SURGERY

## 2021-05-10 PROCEDURE — 97161 PT EVAL LOW COMPLEX 20 MIN: CPT

## 2021-05-10 DEVICE — POLARSTEM COLLAR LATERAL                                    NON-CEMENTED WITH TI/HA 2
Type: IMPLANTABLE DEVICE | Site: HIP | Status: FUNCTIONAL
Brand: POLARSTEM

## 2021-05-10 DEVICE — OXINIUM FEMORAL HEAD 12/14 TAPER                                    32MM -3
Type: IMPLANTABLE DEVICE | Site: HIP | Status: FUNCTIONAL
Brand: OXINIUM

## 2021-05-10 DEVICE — DEV WND/CLS CONTRL TISS STRATAFIX SPIRAL PDS PLS CT1 0 30CM: Type: IMPLANTABLE DEVICE | Site: HIP | Status: FUNCTIONAL

## 2021-05-10 DEVICE — R3 3 HOLE ACETABULAR SHELL 50MM
Type: IMPLANTABLE DEVICE | Site: HIP | Status: FUNCTIONAL
Brand: R3 ACETABULAR

## 2021-05-10 DEVICE — REFLECTION SPHERICAL HEAD SCREW 30MM
Type: IMPLANTABLE DEVICE | Site: HIP | Status: FUNCTIONAL
Brand: REFLECTION

## 2021-05-10 DEVICE — DEV CONTRL TISS STRATAFIX SPIRAL MNCRYL UD 3/0 PLS 30CM: Type: IMPLANTABLE DEVICE | Site: HIP | Status: FUNCTIONAL

## 2021-05-10 DEVICE — IMPLANTABLE DEVICE: Type: IMPLANTABLE DEVICE | Site: HIP | Status: FUNCTIONAL

## 2021-05-10 DEVICE — REFLECTION SPHERICAL HEAD SCREW 20MM
Type: IMPLANTABLE DEVICE | Site: HIP | Status: FUNCTIONAL
Brand: REFLECTION

## 2021-05-10 DEVICE — R3 0 DEGREE XLPE ACETABULAR LINER                                    32MM ID X OD 50MM
Type: IMPLANTABLE DEVICE | Site: HIP | Status: FUNCTIONAL
Brand: R3

## 2021-05-10 RX ORDER — FENTANYL CITRATE 50 UG/ML
50 INJECTION, SOLUTION INTRAMUSCULAR; INTRAVENOUS
Status: DISCONTINUED | OUTPATIENT
Start: 2021-05-10 | End: 2021-05-10 | Stop reason: HOSPADM

## 2021-05-10 RX ORDER — PANTOPRAZOLE SODIUM 40 MG/1
40 TABLET, DELAYED RELEASE ORAL DAILY
Qty: 14 TABLET | Refills: 0 | Status: SHIPPED | OUTPATIENT
Start: 2021-05-10 | End: 2021-05-25

## 2021-05-10 RX ORDER — METAXALONE 800 MG/1
800 TABLET ORAL 3 TIMES DAILY PRN
Status: DISCONTINUED | OUTPATIENT
Start: 2021-05-10 | End: 2021-05-11 | Stop reason: HOSPADM

## 2021-05-10 RX ORDER — HYDROCODONE BITARTRATE AND ACETAMINOPHEN 7.5; 325 MG/1; MG/1
1-2 TABLET ORAL EVERY 4 HOURS PRN
Qty: 42 TABLET | Refills: 0 | Status: SHIPPED | OUTPATIENT
Start: 2021-05-10 | End: 2021-12-29

## 2021-05-10 RX ORDER — LIDOCAINE HYDROCHLORIDE 20 MG/ML
INJECTION, SOLUTION INFILTRATION; PERINEURAL AS NEEDED
Status: DISCONTINUED | OUTPATIENT
Start: 2021-05-10 | End: 2021-05-10 | Stop reason: SURG

## 2021-05-10 RX ORDER — ONDANSETRON 4 MG/1
4 TABLET, FILM COATED ORAL EVERY 8 HOURS PRN
Qty: 10 TABLET | Refills: 0 | Status: SHIPPED | OUTPATIENT
Start: 2021-05-10 | End: 2021-12-29

## 2021-05-10 RX ORDER — EPHEDRINE SULFATE 50 MG/ML
5 INJECTION, SOLUTION INTRAVENOUS ONCE AS NEEDED
Status: DISCONTINUED | OUTPATIENT
Start: 2021-05-10 | End: 2021-05-10 | Stop reason: HOSPADM

## 2021-05-10 RX ORDER — PROMETHAZINE HYDROCHLORIDE 25 MG/1
25 TABLET ORAL ONCE AS NEEDED
Status: DISCONTINUED | OUTPATIENT
Start: 2021-05-10 | End: 2021-05-10 | Stop reason: HOSPADM

## 2021-05-10 RX ORDER — METOPROLOL SUCCINATE 25 MG/1
12.5 TABLET, EXTENDED RELEASE ORAL DAILY
Status: DISCONTINUED | OUTPATIENT
Start: 2021-05-11 | End: 2021-05-11 | Stop reason: HOSPADM

## 2021-05-10 RX ORDER — POLYETHYLENE GLYCOL 3350 17 G/17G
17 POWDER, FOR SOLUTION ORAL 2 TIMES DAILY
Qty: 238 G | Refills: 0 | Status: SHIPPED | OUTPATIENT
Start: 2021-05-10 | End: 2021-05-17

## 2021-05-10 RX ORDER — FLUMAZENIL 0.1 MG/ML
0.2 INJECTION INTRAVENOUS AS NEEDED
Status: DISCONTINUED | OUTPATIENT
Start: 2021-05-10 | End: 2021-05-10 | Stop reason: HOSPADM

## 2021-05-10 RX ORDER — FENTANYL CITRATE 50 UG/ML
INJECTION, SOLUTION INTRAMUSCULAR; INTRAVENOUS AS NEEDED
Status: DISCONTINUED | OUTPATIENT
Start: 2021-05-10 | End: 2021-05-10 | Stop reason: SURG

## 2021-05-10 RX ORDER — PROPOFOL 10 MG/ML
VIAL (ML) INTRAVENOUS AS NEEDED
Status: DISCONTINUED | OUTPATIENT
Start: 2021-05-10 | End: 2021-05-10 | Stop reason: SURG

## 2021-05-10 RX ORDER — PROMETHAZINE HYDROCHLORIDE 25 MG/1
25 SUPPOSITORY RECTAL ONCE AS NEEDED
Status: DISCONTINUED | OUTPATIENT
Start: 2021-05-10 | End: 2021-05-10 | Stop reason: HOSPADM

## 2021-05-10 RX ORDER — LIDOCAINE HYDROCHLORIDE 10 MG/ML
0.5 INJECTION, SOLUTION EPIDURAL; INFILTRATION; INTRACAUDAL; PERINEURAL ONCE AS NEEDED
Status: DISCONTINUED | OUTPATIENT
Start: 2021-05-10 | End: 2021-05-10 | Stop reason: HOSPADM

## 2021-05-10 RX ORDER — OMEGA-3 FATTY ACIDS/FISH OIL 300-1000MG
200 CAPSULE ORAL EVERY 4 HOURS PRN
COMMUNITY
End: 2021-05-11 | Stop reason: HOSPADM

## 2021-05-10 RX ORDER — DEXAMETHASONE SODIUM PHOSPHATE 10 MG/ML
INJECTION INTRAMUSCULAR; INTRAVENOUS AS NEEDED
Status: DISCONTINUED | OUTPATIENT
Start: 2021-05-10 | End: 2021-05-10 | Stop reason: SURG

## 2021-05-10 RX ORDER — EPHEDRINE SULFATE 50 MG/ML
INJECTION, SOLUTION INTRAVENOUS AS NEEDED
Status: DISCONTINUED | OUTPATIENT
Start: 2021-05-10 | End: 2021-05-10 | Stop reason: SURG

## 2021-05-10 RX ORDER — HYDROMORPHONE HYDROCHLORIDE 1 MG/ML
0.5 INJECTION, SOLUTION INTRAMUSCULAR; INTRAVENOUS; SUBCUTANEOUS
Status: DISCONTINUED | OUTPATIENT
Start: 2021-05-10 | End: 2021-05-10 | Stop reason: HOSPADM

## 2021-05-10 RX ORDER — HYDROCODONE BITARTRATE AND ACETAMINOPHEN 7.5; 325 MG/1; MG/1
1 TABLET ORAL EVERY 4 HOURS PRN
Status: DISCONTINUED | OUTPATIENT
Start: 2021-05-10 | End: 2021-05-11 | Stop reason: HOSPADM

## 2021-05-10 RX ORDER — ONDANSETRON 2 MG/ML
4 INJECTION INTRAMUSCULAR; INTRAVENOUS ONCE AS NEEDED
Status: DISCONTINUED | OUTPATIENT
Start: 2021-05-10 | End: 2021-05-10 | Stop reason: HOSPADM

## 2021-05-10 RX ORDER — MAGNESIUM HYDROXIDE 1200 MG/15ML
LIQUID ORAL AS NEEDED
Status: DISCONTINUED | OUTPATIENT
Start: 2021-05-10 | End: 2021-05-10 | Stop reason: HOSPADM

## 2021-05-10 RX ORDER — NEOSTIGMINE METHYLSULFATE 0.5 MG/ML
INJECTION, SOLUTION INTRAVENOUS AS NEEDED
Status: DISCONTINUED | OUTPATIENT
Start: 2021-05-10 | End: 2021-05-10 | Stop reason: SURG

## 2021-05-10 RX ORDER — HYDROMORPHONE HCL 110MG/55ML
PATIENT CONTROLLED ANALGESIA SYRINGE INTRAVENOUS AS NEEDED
Status: DISCONTINUED | OUTPATIENT
Start: 2021-05-10 | End: 2021-05-10 | Stop reason: SURG

## 2021-05-10 RX ORDER — GLYCOPYRROLATE 0.2 MG/ML
INJECTION INTRAMUSCULAR; INTRAVENOUS AS NEEDED
Status: DISCONTINUED | OUTPATIENT
Start: 2021-05-10 | End: 2021-05-10 | Stop reason: SURG

## 2021-05-10 RX ORDER — DROPERIDOL 2.5 MG/ML
0.62 INJECTION, SOLUTION INTRAMUSCULAR; INTRAVENOUS ONCE AS NEEDED
Status: DISCONTINUED | OUTPATIENT
Start: 2021-05-10 | End: 2021-05-10 | Stop reason: HOSPADM

## 2021-05-10 RX ORDER — ACETAMINOPHEN 325 MG/1
325 TABLET ORAL EVERY 4 HOURS PRN
Status: DISCONTINUED | OUTPATIENT
Start: 2021-05-10 | End: 2021-05-11 | Stop reason: HOSPADM

## 2021-05-10 RX ORDER — PROMETHAZINE HYDROCHLORIDE 12.5 MG/1
12.5 TABLET ORAL EVERY 4 HOURS PRN
Status: CANCELLED | OUTPATIENT
Start: 2021-05-10

## 2021-05-10 RX ORDER — NALOXONE HCL 0.4 MG/ML
0.2 VIAL (ML) INJECTION AS NEEDED
Status: DISCONTINUED | OUTPATIENT
Start: 2021-05-10 | End: 2021-05-10 | Stop reason: HOSPADM

## 2021-05-10 RX ORDER — DROPERIDOL 2.5 MG/ML
1.25 INJECTION, SOLUTION INTRAMUSCULAR; INTRAVENOUS ONCE AS NEEDED
Status: DISCONTINUED | OUTPATIENT
Start: 2021-05-10 | End: 2021-05-10 | Stop reason: HOSPADM

## 2021-05-10 RX ORDER — ONDANSETRON 2 MG/ML
4 INJECTION INTRAMUSCULAR; INTRAVENOUS EVERY 6 HOURS PRN
Status: DISCONTINUED | OUTPATIENT
Start: 2021-05-10 | End: 2021-05-11 | Stop reason: HOSPADM

## 2021-05-10 RX ORDER — POLYETHYLENE GLYCOL 3350 17 G/17G
17 POWDER, FOR SOLUTION ORAL 2 TIMES DAILY
Status: DISCONTINUED | OUTPATIENT
Start: 2021-05-10 | End: 2021-05-11 | Stop reason: HOSPADM

## 2021-05-10 RX ORDER — HYDRALAZINE HYDROCHLORIDE 20 MG/ML
5 INJECTION INTRAMUSCULAR; INTRAVENOUS
Status: DISCONTINUED | OUTPATIENT
Start: 2021-05-10 | End: 2021-05-10 | Stop reason: HOSPADM

## 2021-05-10 RX ORDER — ROCURONIUM BROMIDE 10 MG/ML
INJECTION, SOLUTION INTRAVENOUS AS NEEDED
Status: DISCONTINUED | OUTPATIENT
Start: 2021-05-10 | End: 2021-05-10 | Stop reason: SURG

## 2021-05-10 RX ORDER — PANTOPRAZOLE SODIUM 40 MG/1
40 TABLET, DELAYED RELEASE ORAL
Status: DISCONTINUED | OUTPATIENT
Start: 2021-05-11 | End: 2021-05-11 | Stop reason: HOSPADM

## 2021-05-10 RX ORDER — CEFAZOLIN SODIUM 2 G/100ML
2 INJECTION, SOLUTION INTRAVENOUS ONCE
Status: COMPLETED | OUTPATIENT
Start: 2021-05-10 | End: 2021-05-10

## 2021-05-10 RX ORDER — SPIRONOLACTONE 25 MG/1
12.5 TABLET ORAL NIGHTLY
Status: DISCONTINUED | OUTPATIENT
Start: 2021-05-10 | End: 2021-05-11 | Stop reason: HOSPADM

## 2021-05-10 RX ORDER — VANCOMYCIN HYDROCHLORIDE 1 G/200ML
1000 INJECTION, SOLUTION INTRAVENOUS ONCE
Status: COMPLETED | OUTPATIENT
Start: 2021-05-10 | End: 2021-05-10

## 2021-05-10 RX ORDER — CEFAZOLIN SODIUM 2 G/100ML
2 INJECTION, SOLUTION INTRAVENOUS EVERY 8 HOURS
Status: COMPLETED | OUTPATIENT
Start: 2021-05-10 | End: 2021-05-11

## 2021-05-10 RX ORDER — ACETAMINOPHEN 325 MG/1
650 TABLET ORAL ONCE AS NEEDED
Status: DISCONTINUED | OUTPATIENT
Start: 2021-05-10 | End: 2021-05-10 | Stop reason: HOSPADM

## 2021-05-10 RX ORDER — ONDANSETRON 2 MG/ML
INJECTION INTRAMUSCULAR; INTRAVENOUS AS NEEDED
Status: DISCONTINUED | OUTPATIENT
Start: 2021-05-10 | End: 2021-05-10 | Stop reason: SURG

## 2021-05-10 RX ORDER — HYDROCODONE BITARTRATE AND ACETAMINOPHEN 7.5; 325 MG/1; MG/1
1 TABLET ORAL ONCE AS NEEDED
Status: DISCONTINUED | OUTPATIENT
Start: 2021-05-10 | End: 2021-05-10 | Stop reason: HOSPADM

## 2021-05-10 RX ORDER — PREGABALIN 75 MG/1
150 CAPSULE ORAL ONCE
Status: COMPLETED | OUTPATIENT
Start: 2021-05-10 | End: 2021-05-10

## 2021-05-10 RX ORDER — HYDROCODONE BITARTRATE AND ACETAMINOPHEN 7.5; 325 MG/1; MG/1
2 TABLET ORAL EVERY 4 HOURS PRN
Status: DISCONTINUED | OUTPATIENT
Start: 2021-05-10 | End: 2021-05-11 | Stop reason: HOSPADM

## 2021-05-10 RX ORDER — ONDANSETRON 4 MG/1
4 TABLET, FILM COATED ORAL EVERY 6 HOURS PRN
Status: DISCONTINUED | OUTPATIENT
Start: 2021-05-10 | End: 2021-05-11 | Stop reason: HOSPADM

## 2021-05-10 RX ORDER — SODIUM CHLORIDE 0.9 % (FLUSH) 0.9 %
3 SYRINGE (ML) INJECTION EVERY 12 HOURS SCHEDULED
Status: DISCONTINUED | OUTPATIENT
Start: 2021-05-10 | End: 2021-05-10 | Stop reason: HOSPADM

## 2021-05-10 RX ORDER — DIGOXIN 250 MCG
125 TABLET ORAL DAILY
Status: DISCONTINUED | OUTPATIENT
Start: 2021-05-11 | End: 2021-05-11 | Stop reason: HOSPADM

## 2021-05-10 RX ORDER — TRANEXAMIC ACID 100 MG/ML
INJECTION, SOLUTION INTRAVENOUS AS NEEDED
Status: DISCONTINUED | OUTPATIENT
Start: 2021-05-10 | End: 2021-05-10 | Stop reason: SURG

## 2021-05-10 RX ORDER — DIPHENHYDRAMINE HYDROCHLORIDE 50 MG/ML
12.5 INJECTION INTRAMUSCULAR; INTRAVENOUS
Status: DISCONTINUED | OUTPATIENT
Start: 2021-05-10 | End: 2021-05-10 | Stop reason: HOSPADM

## 2021-05-10 RX ORDER — SODIUM CHLORIDE 0.9 % (FLUSH) 0.9 %
3-10 SYRINGE (ML) INJECTION AS NEEDED
Status: DISCONTINUED | OUTPATIENT
Start: 2021-05-10 | End: 2021-05-10 | Stop reason: HOSPADM

## 2021-05-10 RX ORDER — MELOXICAM 15 MG/1
15 TABLET ORAL ONCE
Status: COMPLETED | OUTPATIENT
Start: 2021-05-10 | End: 2021-05-10

## 2021-05-10 RX ORDER — ESMOLOL HYDROCHLORIDE 10 MG/ML
INJECTION INTRAVENOUS AS NEEDED
Status: DISCONTINUED | OUTPATIENT
Start: 2021-05-10 | End: 2021-05-10 | Stop reason: SURG

## 2021-05-10 RX ORDER — DIPHENHYDRAMINE HCL 25 MG
25 CAPSULE ORAL
Status: DISCONTINUED | OUTPATIENT
Start: 2021-05-10 | End: 2021-05-10 | Stop reason: HOSPADM

## 2021-05-10 RX ORDER — SCOLOPAMINE TRANSDERMAL SYSTEM 1 MG/1
1 PATCH, EXTENDED RELEASE TRANSDERMAL ONCE
Status: DISCONTINUED | OUTPATIENT
Start: 2021-05-10 | End: 2021-05-10

## 2021-05-10 RX ORDER — SODIUM CHLORIDE, SODIUM LACTATE, POTASSIUM CHLORIDE, CALCIUM CHLORIDE 600; 310; 30; 20 MG/100ML; MG/100ML; MG/100ML; MG/100ML
9 INJECTION, SOLUTION INTRAVENOUS CONTINUOUS
Status: DISCONTINUED | OUTPATIENT
Start: 2021-05-10 | End: 2021-05-10 | Stop reason: HOSPADM

## 2021-05-10 RX ORDER — LABETALOL HYDROCHLORIDE 5 MG/ML
5 INJECTION, SOLUTION INTRAVENOUS
Status: DISCONTINUED | OUTPATIENT
Start: 2021-05-10 | End: 2021-05-10 | Stop reason: HOSPADM

## 2021-05-10 RX ADMIN — SCOLOPAMINE TRANSDERMAL SYSTEM 1 PATCH: 1 PATCH, EXTENDED RELEASE TRANSDERMAL at 07:11

## 2021-05-10 RX ADMIN — NEOSTIGMINE METHYLSULFATE 3 MG: 0.5 INJECTION INTRAVENOUS at 09:54

## 2021-05-10 RX ADMIN — ESMOLOL HYDROCHLORIDE 20 MG: 100 INJECTION, SOLUTION INTRAVENOUS at 09:59

## 2021-05-10 RX ADMIN — EPHEDRINE SULFATE 10 MG: 50 INJECTION INTRAVENOUS at 08:53

## 2021-05-10 RX ADMIN — ROCURONIUM BROMIDE 20 MG: 50 INJECTION INTRAVENOUS at 08:48

## 2021-05-10 RX ADMIN — EPHEDRINE SULFATE 5 MG: 50 INJECTION INTRAVENOUS at 09:53

## 2021-05-10 RX ADMIN — FENTANYL CITRATE 25 MCG: 50 INJECTION INTRAMUSCULAR; INTRAVENOUS at 09:06

## 2021-05-10 RX ADMIN — POLYETHYLENE GLYCOL 3350 17 G: 17 POWDER, FOR SOLUTION ORAL at 20:48

## 2021-05-10 RX ADMIN — EPHEDRINE SULFATE 10 MG: 50 INJECTION INTRAVENOUS at 09:14

## 2021-05-10 RX ADMIN — HYDROCODONE BITARTRATE AND ACETAMINOPHEN 1 TABLET: 7.5; 325 TABLET ORAL at 23:33

## 2021-05-10 RX ADMIN — CEFAZOLIN SODIUM 2 G: 2 INJECTION, SOLUTION INTRAVENOUS at 23:34

## 2021-05-10 RX ADMIN — FENTANYL CITRATE 50 MCG: 50 INJECTION INTRAMUSCULAR; INTRAVENOUS at 08:50

## 2021-05-10 RX ADMIN — HYDROMORPHONE HYDROCHLORIDE 0.5 MG: 2 INJECTION, SOLUTION INTRAMUSCULAR; INTRAVENOUS; SUBCUTANEOUS at 09:57

## 2021-05-10 RX ADMIN — EPHEDRINE SULFATE 10 MG: 50 INJECTION INTRAVENOUS at 09:15

## 2021-05-10 RX ADMIN — EPHEDRINE SULFATE 20 MG: 50 INJECTION INTRAVENOUS at 08:19

## 2021-05-10 RX ADMIN — TRANEXAMIC ACID 1000 MG: 1 INJECTION, SOLUTION INTRAVENOUS at 09:33

## 2021-05-10 RX ADMIN — FENTANYL CITRATE 25 MCG: 50 INJECTION INTRAMUSCULAR; INTRAVENOUS at 08:12

## 2021-05-10 RX ADMIN — DEXAMETHASONE SODIUM PHOSPHATE 8 MG: 10 INJECTION INTRAMUSCULAR; INTRAVENOUS at 08:30

## 2021-05-10 RX ADMIN — ROCURONIUM BROMIDE 30 MG: 50 INJECTION INTRAVENOUS at 08:12

## 2021-05-10 RX ADMIN — GLYCOPYRROLATE 0.6 MG: 0.2 INJECTION INTRAMUSCULAR; INTRAVENOUS at 09:54

## 2021-05-10 RX ADMIN — TRANEXAMIC ACID 1000 MG: 1 INJECTION, SOLUTION INTRAVENOUS at 08:35

## 2021-05-10 RX ADMIN — MUPIROCIN 1 APPLICATION: 20 OINTMENT TOPICAL at 20:48

## 2021-05-10 RX ADMIN — PREGABALIN 150 MG: 75 CAPSULE ORAL at 07:11

## 2021-05-10 RX ADMIN — SODIUM CHLORIDE, POTASSIUM CHLORIDE, SODIUM LACTATE AND CALCIUM CHLORIDE 9 ML/HR: 600; 310; 30; 20 INJECTION, SOLUTION INTRAVENOUS at 07:02

## 2021-05-10 RX ADMIN — LIDOCAINE HYDROCHLORIDE 60 MG: 20 INJECTION, SOLUTION INFILTRATION; PERINEURAL at 08:12

## 2021-05-10 RX ADMIN — CEFAZOLIN SODIUM 2 G: 2 INJECTION, SOLUTION INTRAVENOUS at 16:43

## 2021-05-10 RX ADMIN — PROPOFOL 80 MG: 10 INJECTION, EMULSION INTRAVENOUS at 08:12

## 2021-05-10 RX ADMIN — EPHEDRINE SULFATE 10 MG: 50 INJECTION INTRAVENOUS at 08:21

## 2021-05-10 RX ADMIN — CEFAZOLIN SODIUM 2 G: 2 INJECTION, SOLUTION INTRAVENOUS at 07:57

## 2021-05-10 RX ADMIN — EPHEDRINE SULFATE 10 MG: 50 INJECTION INTRAVENOUS at 09:34

## 2021-05-10 RX ADMIN — VANCOMYCIN HYDROCHLORIDE 1000 MG: 1 INJECTION, SOLUTION INTRAVENOUS at 07:04

## 2021-05-10 RX ADMIN — EPHEDRINE SULFATE 10 MG: 50 INJECTION INTRAVENOUS at 09:23

## 2021-05-10 RX ADMIN — EPHEDRINE SULFATE 10 MG: 50 INJECTION INTRAVENOUS at 09:49

## 2021-05-10 RX ADMIN — MELOXICAM 15 MG: 15 TABLET ORAL at 07:11

## 2021-05-10 RX ADMIN — ONDANSETRON 4 MG: 2 INJECTION INTRAMUSCULAR; INTRAVENOUS at 09:41

## 2021-05-10 NOTE — ANESTHESIA PREPROCEDURE EVALUATION
Anesthesia Evaluation     Patient summary reviewed and Nursing notes reviewed   history of anesthetic complications: PONV  NPO Solid Status: > 8 hours  NPO Liquid Status: > 2 hours           Airway   Mallampati: II  TM distance: >3 FB  Neck ROM: full  No difficulty expected  Dental - normal exam     Pulmonary     breath sounds clear to auscultation  Cardiovascular   Exercise tolerance: good (4-7 METS)    ECG reviewed  Rhythm: regular  Rate: normal    (+) valvular problems/murmurs, dysrhythmias Atrial Fib,     ROS comment: MVR s/p repair  Echo reviewed  Nonischemic cardiomyopathy: EF 25%    Neuro/Psych  GI/Hepatic/Renal/Endo      Musculoskeletal     Abdominal    Substance History      OB/GYN          Other                        Anesthesia Plan    ASA 3     general   (Plan for pre-induction arterial line due to cardiomyopathy)  intravenous induction     Anesthetic plan, all risks, benefits, and alternatives have been provided, discussed and informed consent has been obtained with: patient.    Plan discussed with CRNA.

## 2021-05-10 NOTE — ANESTHESIA PROCEDURE NOTES
Arterial Line    Pre-sedation assessment completed: 5/10/2021 7:24 AM    Patient reassessed immediately prior to procedure    Patient location during procedure: pre-op  Start time: 5/10/2021 7:25 AM  Stop Time:5/10/2021 7:30 AM       Line placed for hemodynamic monitoring and ABGs/Labs/ISTAT.  Performed By   Anesthesiologist: Carl Doherty MD  Preanesthetic Checklist  Completed: patient identified, IV checked, site marked, risks and benefits discussed, surgical consent, monitors and equipment checked, pre-op evaluation and timeout performed  Arterial Line Prep   Sterile Tech: gloves, mask, cap and sterile barriers  Prep: ChloraPrep  Patient monitoring: blood pressure monitoring, continuous pulse oximetry and EKG  Arterial Line Procedure   Laterality:right  Location:  radial artery  Catheter size: 20 G   Guidance: ultrasound guided  PROCEDURE NOTE/ULTRASOUND INTERPRETATION.  Using ultrasound guidance the potential vascular sites for insertion of the catheter were visualized to determine the patency of the vessel to be used for vascular access.  After selecting the appropriate site for insertion, the needle was visualized under ultrasound being inserted into the radial artery, followed by ultrasound confirmation of wire and catheter placement. There were no abnormalities seen on ultrasound; an image was taken; and the patient tolerated the procedure with no complications.   Number of attempts: 1  Successful placement: yes  Post Assessment   Dressing Type: occlusive dressing applied, secured with tape and wrist guard applied.   Complications no  Circ/Move/Sens Assessment: unchanged.   Patient Tolerance: patient tolerated the procedure well with no apparent complications

## 2021-05-10 NOTE — ANESTHESIA POSTPROCEDURE EVALUATION
Patient: Rasheeda Balderas    Procedure Summary     Date: 05/10/21 Room / Location: [facility] OR [address] [place] [address] MAIN OR    Anesthesia Start: 0801 Anesthesia Stop: 1015    Procedure: TOTAL HIP ARTHROPLASTY ANTERIOR WITH HANA TABLE (Right Hip) Diagnosis:       Primary osteoarthritis of right hip      (Primary osteoarthritis of right hip [M16.11])    Surgeons: Jasmeet Graham MD Provider: Carl Doherty MD    Anesthesia Type: general ASA Status: 3          Anesthesia Type: general    Vitals  Vitals Value Taken Time   BP 99/55 05/10/21 1115   Temp 36.5 °C (97.7 °F) 05/10/21 1010   Pulse 62 05/10/21 1115   Resp 16 05/10/21 1115   SpO2 95 % 05/10/21 1119           Post Anesthesia Care and Evaluation    Patient location during evaluation: bedside  Patient participation: complete - patient participated  Level of consciousness: awake and alert  Pain management: adequate  Airway patency: patent  Anesthetic complications: No anesthetic complications  PONV Status: controlled  Cardiovascular status: blood pressure returned to baseline and acceptable  Respiratory status: acceptable  Hydration status: acceptable

## 2021-05-10 NOTE — ANESTHESIA PROCEDURE NOTES
Airway  Urgency: elective    Date/Time: 5/10/2021 8:22 AM  Airway not difficult    General Information and Staff    Patient location during procedure: OR  Anesthesiologist: Carl Doherty MD  CRNA: Nevin Viera CRNA    Indications and Patient Condition  Indications for airway management: airway protection    Preoxygenated: yes (pt pre-O2 with 100% O2)  Mask difficulty assessment: 2 - vent by mask + OA or adjuvant +/- NMBA (easy BMV )    Final Airway Details  Final airway type: endotracheal airway      Successful airway: ETT  Cuffed: yes   Successful intubation technique: direct laryngoscopy  Endotracheal tube insertion site: oral  Blade: Gerry  Blade size: 3  ETT size (mm): 7.0  Cormack-Lehane Classification: grade I - full view of glottis  Placement verified by: chest auscultation and capnometry   Cuff volume (mL): 7  Measured from: lips  ETT/EBT  to lips (cm): 21  Number of attempts at approach: 1  Assessment: lips, teeth, and gum same as pre-op and atraumatic intubation    Additional Comments  ATOETx1. No change in dentition.

## 2021-05-11 VITALS
RESPIRATION RATE: 16 BRPM | DIASTOLIC BLOOD PRESSURE: 66 MMHG | HEART RATE: 73 BPM | SYSTOLIC BLOOD PRESSURE: 104 MMHG | HEIGHT: 65 IN | TEMPERATURE: 97.9 F | BODY MASS INDEX: 22.63 KG/M2 | WEIGHT: 135.8 LBS | OXYGEN SATURATION: 97 %

## 2021-05-11 LAB
HCT VFR BLD AUTO: 28.6 % (ref 34–46.6)
HGB BLD-MCNC: 9.6 G/DL (ref 12–15.9)

## 2021-05-11 PROCEDURE — G0378 HOSPITAL OBSERVATION PER HR: HCPCS

## 2021-05-11 PROCEDURE — 97110 THERAPEUTIC EXERCISES: CPT

## 2021-05-11 PROCEDURE — 85018 HEMOGLOBIN: CPT | Performed by: NURSE PRACTITIONER

## 2021-05-11 PROCEDURE — 85014 HEMATOCRIT: CPT | Performed by: NURSE PRACTITIONER

## 2021-05-11 RX ADMIN — HYDROCODONE BITARTRATE AND ACETAMINOPHEN 1 TABLET: 7.5; 325 TABLET ORAL at 07:45

## 2021-05-11 RX ADMIN — DIGOXIN 125 MCG: 250 TABLET ORAL at 07:45

## 2021-05-11 RX ADMIN — MUPIROCIN 1 APPLICATION: 20 OINTMENT TOPICAL at 07:44

## 2021-05-11 RX ADMIN — METOPROLOL SUCCINATE 12.5 MG: 25 TABLET, EXTENDED RELEASE ORAL at 07:46

## 2021-05-11 RX ADMIN — PANTOPRAZOLE SODIUM 40 MG: 40 TABLET, DELAYED RELEASE ORAL at 06:18

## 2021-05-11 RX ADMIN — APIXABAN 5 MG: 5 TABLET, FILM COATED ORAL at 07:45

## 2021-05-25 ENCOUNTER — OFFICE VISIT (OUTPATIENT)
Dept: ORTHOPEDIC SURGERY | Facility: CLINIC | Age: 71
End: 2021-05-25

## 2021-05-25 VITALS — TEMPERATURE: 97.1 F | WEIGHT: 135 LBS | BODY MASS INDEX: 22.49 KG/M2 | HEIGHT: 65 IN

## 2021-05-25 DIAGNOSIS — Z96.641 STATUS POST TOTAL REPLACEMENT OF RIGHT HIP: Primary | ICD-10-CM

## 2021-05-25 PROCEDURE — 99024 POSTOP FOLLOW-UP VISIT: CPT | Performed by: ORTHOPAEDIC SURGERY

## 2021-05-25 PROCEDURE — 73502 X-RAY EXAM HIP UNI 2-3 VIEWS: CPT | Performed by: ORTHOPAEDIC SURGERY

## 2021-05-25 NOTE — PROGRESS NOTES
Rasheeda Balderas : 1950 MRN: [id number] DATE: 2021    DIAGNOSIS: 2 week follow up right total hip anterior    SUBJECTIVE:Patient returns today for 2 week follow up of right total hip replacement. Patient reports doing well with no unusual complaints. Appears to be progressing appropriately.    OBJECTIVE:   Exam:. The incision is healing appropriately. No sign of infection. Range of motion is progressing as expected. The calf is soft and nontender with a negative Homans sign.    DIAGNOSTIC STUDIES  Xrays: 2 views of the right hip (AP pelvis and lateral right hip) were ordered and reviewed for evaluation of recent hip replacement. They demonstrate a well positioned, well aligned hip replacement without complicating factors noted. In comparison with previous films there has been interval implant placement.    ASSESSMENT: 2 week status post right hip replacement.    PLAN: 1) Staples removed and steri strips applied   2) PT exercises   3) Discontinue FERMIN hose   4) Continue ice PRN   5) WBAT   6) aspirin 81 mg orally every day for 1 month   7) Follow up in 6 weeks with repeat Xrays of right hip (2views)    Jasmeet Graham MD  2021

## 2021-07-08 ENCOUNTER — OFFICE VISIT (OUTPATIENT)
Dept: ORTHOPEDIC SURGERY | Facility: CLINIC | Age: 71
End: 2021-07-08

## 2021-07-08 VITALS — TEMPERATURE: 97.1 F | WEIGHT: 129.6 LBS | HEIGHT: 65 IN | BODY MASS INDEX: 21.59 KG/M2

## 2021-07-08 DIAGNOSIS — Z96.641 STATUS POST TOTAL REPLACEMENT OF RIGHT HIP: ICD-10-CM

## 2021-07-08 DIAGNOSIS — R52 PAIN: Primary | ICD-10-CM

## 2021-07-08 PROCEDURE — 99024 POSTOP FOLLOW-UP VISIT: CPT | Performed by: NURSE PRACTITIONER

## 2021-07-08 PROCEDURE — 73502 X-RAY EXAM HIP UNI 2-3 VIEWS: CPT | Performed by: NURSE PRACTITIONER

## 2021-07-08 NOTE — PROGRESS NOTES
Rasheeda Balderas : 1950 MRN: [id number] DATE: 2021    DIAGNOSIS: 8 week follow up right total hip (Anterior)    SUBJECTIVE:Patient returns today for 8 week follow up of right total hip replacement. Patient reports doing well with no unusual complaints. Appears to be progressing appropriately and is off a cane.     OBJECTIVE:   Exam:. The incision is healed. No sign of infection. Range of motion is progressing as expected. The calf is soft and nontender with a negative Homans sign. Strength progressing    DIAGNOSTIC STUDIES  Xrays: 2 views of the right hip (AP pelvis and lateral right hip) were ordered and reviewed for evaluation of recent hip replacement. They demonstrate a well positioned, well aligned hip replacement without complicating factors noted. In comparison with previous films there has been interval implant placement.    ASSESSMENT: 8 week status post right hip replacement.    PLAN: 1) Activity as tolerated   2) Continue hip strengthening exercises    3) Follow up 1 year post-op with repeat Xrays of right hip (2views AP Pelvis      and lateral left hip)    RICHA Ventura  2021

## 2021-08-16 RX ORDER — APIXABAN 5 MG/1
TABLET, FILM COATED ORAL
Qty: 90 TABLET | Refills: 2 | Status: SHIPPED | OUTPATIENT
Start: 2021-08-16 | End: 2022-01-20

## 2021-08-23 RX ORDER — METOPROLOL SUCCINATE 25 MG/1
TABLET, EXTENDED RELEASE ORAL
Qty: 30 TABLET | Refills: 5 | Status: SHIPPED | OUTPATIENT
Start: 2021-08-23 | End: 2022-09-13

## 2021-10-21 RX ORDER — SPIRONOLACTONE 25 MG/1
TABLET ORAL
Qty: 45 TABLET | Refills: 3 | Status: SHIPPED | OUTPATIENT
Start: 2021-10-21 | End: 2022-09-13

## 2021-11-01 RX ORDER — DIGOXIN 125 MCG
TABLET ORAL
Qty: 90 TABLET | Refills: 3 | Status: SHIPPED | OUTPATIENT
Start: 2021-11-01 | End: 2022-09-13

## 2021-12-08 ENCOUNTER — APPOINTMENT (OUTPATIENT)
Dept: WOMENS IMAGING | Facility: HOSPITAL | Age: 71
End: 2021-12-08

## 2021-12-08 PROCEDURE — 77063 BREAST TOMOSYNTHESIS BI: CPT | Performed by: RADIOLOGY

## 2021-12-08 PROCEDURE — 77067 SCR MAMMO BI INCL CAD: CPT | Performed by: RADIOLOGY

## 2021-12-28 NOTE — PROGRESS NOTES
OFFICE VISIT      Date of Office Visit: 2021    Patient Name: Rasheeda Balderas  : 1950    Encounter Provider: RICHA Martinez  Referring Provider: No ref. provider found  Primary Care Provider: Malini Simeon MD  Place of Service: [facility] CARDIOLOGY        No chief complaint on file.    History of Present Illness  Ms. Diallo is a 71-year-old female patient of Dr. Hand.  She is new to me today and presents for her one year follow-up.  She has a history of paroxysmal atrial fibrillation, severe mitral insufficiency with no significant coronary disease.  In 2017, she went to [facility] where she had a ring annuloplasty and then Drew stitch placed in her mitral valve.  Cardiac cath performed to evaluate possibility of mitral valve repair in 2019 showed normal coronaries, normal right-sided pressures and normal LV function.  She did well until 2020 when she showed up with shortness of breath, elevated proBNP and reduced LV function with an EF 40%.  She underwent a ROBBIE in 2020 that did not reveal severe MR or MS but rather nonischemic cardiomyopathy with EF 25%.  She has been on guideline directed medical therapy.    She has always had borderline low blood pressures; therefore, limited in on how much medication can be used.  She has no complaints of chest pain, dizziness or palpitations.  She has no complaints of shortness of breath except when she is going up stairs.  This has not changed in the last year and a half.  She appears to be doing well and remains active in her daily life.      Past Medical History:   Diagnosis Date   • Arthritis    • Atrial fibrillation (CMS/HCC)    • Bone spur    • CAD (coronary artery disease)    • Heartburn    • Mitral valve prolapse syndrome    • Myocardial infarction (CMS/HCC)     pt is not aware of history of MI   • Osteoporosis    • PONV (postoperative nausea and vomiting)    •  Right hip pain          Past Surgical History:   Procedure Laterality Date   • CARDIAC CATHETERIZATION N/A 3/7/2019    Procedure: Right and Left Heart Cath;  Surgeon: Cam Hand MD;  Location: [facility] CATH INVASIVE LOCATION;  Service: Cardiology   • CARDIAC CATHETERIZATION N/A 3/7/2019    Procedure: Coronary angiography;  Surgeon: Cam Hand MD;  [facility];  Service: Cardiology   • CARDIAC CATHETERIZATION N/A 3/7/2019    Procedure: Left ventriculography;  Surgeon: Cam Hand MD;  [facility];  Service: Cardiology   • COLONOSCOPY N/A 4/7/2021    Procedure: COLONOSCOPY TO CECUM;  Surgeon: Mary Tsai MD;  Location: [facility] ENDOSCOPY;  Service: Gastroenterology;  Laterality: N/A;  PRE: SCREENING, FAMILY HX COLON POLYPS  POST: DIVERTICULOSIS, HEMORRHOIDS   • LAPAROSCOPIC CHOLECYSTECTOMY W/ CHOLANGIOGRAPHY     • MITRAL VALVE REPLACEMENT     • SHOULDER SURGERY Left    • TOTAL HIP ARTHROPLASTY Right 5/10/2021    Procedure: TOTAL HIP ARTHROPLASTY ANTERIOR WITH HANA TABLE;  Surgeon: Jasmeet Graham MD;  Location: [facility] MAIN OR;  Service: Orthopedics;  Laterality: Right;           Current Outpatient Medications:   •  digoxin (LANOXIN) 125 MCG tablet, TAKE 1 TABLET DAILY, Disp: 90 tablet, Rfl: 3  •  Eliquis 5 MG tablet tablet, TAKE 1 TABLET EVERY 12 HOURS, Disp: 90 tablet, Rfl: 2  •  HYDROcodone-acetaminophen (NORCO) 7.5-325 MG per tablet, Take 1-2 tablets by mouth Every 4 (Four) to 6 (Six) Hours As Needed for pain, Disp: 42 tablet, Rfl: 0  •  loratadine (Claritin) 10 MG tablet, Take 10 mg by mouth Daily., Disp: , Rfl:   •  metaxalone (SKELAXIN) 800 MG tablet, Take 800 mg by mouth As Needed for Muscle Spasms., Disp: , Rfl:   •  metoprolol succinate XL (TOPROL-XL) 25 MG 24 hr tablet, TAKE ONE-HALF (1/2) TABLET DAILY, Disp: 30 tablet, Rfl: 5  •  ondansetron (Zofran) 4 MG tablet, Take 1 tablet by mouth Every 8 (Eight) Hours As Needed for Nausea or  Vomiting for up to 10 doses., Disp: 10 tablet, Rfl: 0  •  spironolactone (ALDACTONE) 25 MG tablet, TAKE ONE-HALF (1/2) TABLET DAILY, Disp: 45 tablet, Rfl: 3  •  zoledronic acid (RECLAST) 5 MG/100ML solution injection, Infuse 5 mg into a venous catheter 1 (one) time. 1 time yearly, Disp: , Rfl:   No current facility-administered medications for this visit.    Facility-Administered Medications Ordered in Other Visits:   •  Chlorhexidine Gluconate 2 % pads 2 each, 2 pad, Apply externally, DELIA, Jasmeet Graham MD      Social History     Socioeconomic History   • Marital status:    Tobacco Use   • Smoking status: Former Smoker     Packs/day: 0.50     Types: Cigarettes     Quit date:      Years since quittin.0   • Smokeless tobacco: Never Used   Vaping Use   • Vaping Use: Never used   Substance and Sexual Activity   • Alcohol use: Yes     Alcohol/week: 3.0 standard drinks     Types: 1 Glasses of wine, 1 Cans of beer, 1 Shots of liquor per week     Comment: 8 PER WEEK   • Drug use: No   • Sexual activity: Defer         Review of Systems   Constitutional: Negative.   HENT: Negative.    Eyes: Negative.    Cardiovascular: Negative.    Respiratory: Negative.    Endocrine: Negative.    Hematologic/Lymphatic:        On Eliquis   Skin: Negative.    Musculoskeletal: Negative.    Gastrointestinal: Negative.    Genitourinary: Negative.    Neurological: Negative.    Psychiatric/Behavioral: Negative.    Allergic/Immunologic: Negative.        Procedures      ECG 12 Lead    Date/Time: 2021 10:35 AM  Performed by: Mariama Post APRN  Authorized by: Mariama Post APRN   Comparison: compared with previous ECG from 2021  Similar to previous ECG  Rhythm: atrial fibrillation  Rate: normal  ST Segments: ST segments normal  T flattening: V2  Other findings: T wave abnormality    Clinical impression: non-specific ECG                Objective:    LMP  (LMP Unknown)       Physical Exam:   General Appearance:     Alert, cooperative, in no acute distress           Neck:   No thyromegaly, no carotid bruit, no JVD   Lungs:     Clear to auscultation,respirations regular, even and unlabored    Heart:    Irregular rhythm/rate, normal S1 and S2, no murmur, no gallop, no rub       Abdomen:     Normal bowel sounds, soft, nontender, nondistended   Extremities:   Moves all extremities well, no edema, no cyanosis, no redness   Pulses:   Pulses palpable and equal bilaterally.            Assessment:   Diagnoses and all orders for this visit:    1. S/P MVR (mitral valve repair) (Primary)    2. NICM (nonischemic cardiomyopathy) (HCC)    3. Paroxysmal atrial fibrillation (HCC)              Plan:       1.  Nonischemic cardiomyopathy: ROBBIE December 2020 showed EF 25%, left heart cath 2019 showed normal coronaries.  Asymptomatic.  Continue guideline medical therapy.  Repeat 2D echocardiogram within the next month.  2.  Paroxysmal atrial fibrillation: Rate controlled.  She is on Eliquis, metoprolol and digoxin.  Blood pressures usually remain on the low side of normal therefore we are careful with how much medication is used.  Patient to follow-up with PCP in the next month and will have digoxin level drawn with her annual labs.  3.  Status post mitral valve repair: No murmurs, shortness of breath only when she goes up stairs which has not changed in the last year and a half.

## 2021-12-29 ENCOUNTER — OFFICE VISIT (OUTPATIENT)
Dept: CARDIOLOGY | Facility: CLINIC | Age: 71
End: 2021-12-29

## 2021-12-29 VITALS
OXYGEN SATURATION: 100 % | WEIGHT: 131 LBS | BODY MASS INDEX: 21.83 KG/M2 | HEIGHT: 65 IN | DIASTOLIC BLOOD PRESSURE: 80 MMHG | SYSTOLIC BLOOD PRESSURE: 122 MMHG | HEART RATE: 62 BPM

## 2021-12-29 DIAGNOSIS — I42.8 NICM (NONISCHEMIC CARDIOMYOPATHY) (HCC): ICD-10-CM

## 2021-12-29 DIAGNOSIS — Z98.890 S/P MVR (MITRAL VALVE REPAIR): Primary | ICD-10-CM

## 2021-12-29 DIAGNOSIS — I48.0 PAROXYSMAL ATRIAL FIBRILLATION (HCC): ICD-10-CM

## 2021-12-29 PROCEDURE — 93000 ELECTROCARDIOGRAM COMPLETE: CPT | Performed by: NURSE PRACTITIONER

## 2021-12-29 PROCEDURE — 99214 OFFICE O/P EST MOD 30 MIN: CPT | Performed by: NURSE PRACTITIONER

## 2021-12-29 RX ORDER — ACETAMINOPHEN 500 MG
500 TABLET ORAL EVERY 6 HOURS PRN
COMMUNITY

## 2022-01-12 ENCOUNTER — HOSPITAL ENCOUNTER (OUTPATIENT)
Dept: CARDIOLOGY | Facility: HOSPITAL | Age: 72
Discharge: HOME | End: 2022-01-12
Admitting: NURSE PRACTITIONER

## 2022-01-12 VITALS
HEART RATE: 60 BPM | BODY MASS INDEX: 21.83 KG/M2 | DIASTOLIC BLOOD PRESSURE: 73 MMHG | WEIGHT: 131 LBS | SYSTOLIC BLOOD PRESSURE: 99 MMHG | HEIGHT: 65 IN

## 2022-01-12 DIAGNOSIS — I42.8 NICM (NONISCHEMIC CARDIOMYOPATHY): ICD-10-CM

## 2022-01-12 DIAGNOSIS — I48.0 PAROXYSMAL ATRIAL FIBRILLATION: ICD-10-CM

## 2022-01-12 DIAGNOSIS — Z98.890 S/P MVR (MITRAL VALVE REPAIR): ICD-10-CM

## 2022-01-12 LAB
ASCENDING AORTA: 2.8 CM
BH CV ECHO MEAS - ACS: 2 CM
BH CV ECHO MEAS - AO MAX PG (FULL): 3 MMHG
BH CV ECHO MEAS - AO MAX PG: 5.5 MMHG
BH CV ECHO MEAS - AO MEAN PG (FULL): 1.6 MMHG
BH CV ECHO MEAS - AO MEAN PG: 3 MMHG
BH CV ECHO MEAS - AO ROOT AREA (BSA CORRECTED): 1.8
BH CV ECHO MEAS - AO ROOT AREA: 6.8 CM^2
BH CV ECHO MEAS - AO ROOT DIAM: 3 CM
BH CV ECHO MEAS - AO V2 MAX: 117.3 CM/SEC
BH CV ECHO MEAS - AO V2 MEAN: 81.4 CM/SEC
BH CV ECHO MEAS - AO V2 VTI: 20.3 CM
BH CV ECHO MEAS - ASC AORTA: 2.8 CM
BH CV ECHO MEAS - AVA(I,A): 2.4 CM^2
BH CV ECHO MEAS - AVA(I,D): 2.4 CM^2
BH CV ECHO MEAS - AVA(V,A): 2 CM^2
BH CV ECHO MEAS - AVA(V,D): 2 CM^2
BH CV ECHO MEAS - BSA(HAYCOCK): 1.6 M^2
BH CV ECHO MEAS - BSA: 1.6 M^2
BH CV ECHO MEAS - BZI_BMI: 21.6 KILOGRAMS/M^2
BH CV ECHO MEAS - BZI_METRIC_HEIGHT: 165.1 CM
BH CV ECHO MEAS - BZI_METRIC_WEIGHT: 59 KG
BH CV ECHO MEAS - EDV(CUBED): 134.5 ML
BH CV ECHO MEAS - EDV(MOD-SP2): 67 ML
BH CV ECHO MEAS - EDV(MOD-SP4): 73 ML
BH CV ECHO MEAS - EDV(TEICH): 125.1 ML
BH CV ECHO MEAS - EF(CUBED): 38.5 %
BH CV ECHO MEAS - EF(MOD-BP): 44.6 %
BH CV ECHO MEAS - EF(MOD-SP2): 43.3 %
BH CV ECHO MEAS - EF(MOD-SP4): 43.8 %
BH CV ECHO MEAS - EF(TEICH): 31.6 %
BH CV ECHO MEAS - ESV(CUBED): 82.6 ML
BH CV ECHO MEAS - ESV(MOD-SP2): 38 ML
BH CV ECHO MEAS - ESV(MOD-SP4): 41 ML
BH CV ECHO MEAS - ESV(TEICH): 85.6 ML
BH CV ECHO MEAS - FS: 15 %
BH CV ECHO MEAS - IVS/LVPW: 1
BH CV ECHO MEAS - IVSD: 0.96 CM
BH CV ECHO MEAS - LAT PEAK E' VEL: 9 CM/SEC
BH CV ECHO MEAS - LV DIASTOLIC VOL/BSA (35-75): 44.3 ML/M^2
BH CV ECHO MEAS - LV MASS(C)D: 178.7 GRAMS
BH CV ECHO MEAS - LV MASS(C)DI: 108.5 GRAMS/M^2
BH CV ECHO MEAS - LV MAX PG: 2.5 MMHG
BH CV ECHO MEAS - LV MEAN PG: 1.4 MMHG
BH CV ECHO MEAS - LV SYSTOLIC VOL/BSA (12-30): 24.9 ML/M^2
BH CV ECHO MEAS - LV V1 MAX: 79.3 CM/SEC
BH CV ECHO MEAS - LV V1 MEAN: 54.9 CM/SEC
BH CV ECHO MEAS - LV V1 VTI: 16.4 CM
BH CV ECHO MEAS - LVIDD: 5.1 CM
BH CV ECHO MEAS - LVIDS: 4.4 CM
BH CV ECHO MEAS - LVLD AP2: 6.3 CM
BH CV ECHO MEAS - LVLD AP4: 5.7 CM
BH CV ECHO MEAS - LVLS AP2: 5.5 CM
BH CV ECHO MEAS - LVLS AP4: 5.2 CM
BH CV ECHO MEAS - LVOT AREA (M): 2.8 CM^2
BH CV ECHO MEAS - LVOT AREA: 2.9 CM^2
BH CV ECHO MEAS - LVOT DIAM: 1.9 CM
BH CV ECHO MEAS - LVPWD: 0.95 CM
BH CV ECHO MEAS - MED PEAK E' VEL: 6.5 CM/SEC
BH CV ECHO MEAS - MR MAX PG: 25.4 MMHG
BH CV ECHO MEAS - MR MAX VEL: 252.1 CM/SEC
BH CV ECHO MEAS - MV DEC SLOPE: 336.7 CM/SEC^2
BH CV ECHO MEAS - MV DEC TIME: 0.3 SEC
BH CV ECHO MEAS - MV E MAX VEL: 123 CM/SEC
BH CV ECHO MEAS - MV MAX PG: 8.8 MMHG
BH CV ECHO MEAS - MV MEAN PG: 3.8 MMHG
BH CV ECHO MEAS - MV P1/2T MAX VEL: 144.5 CM/SEC
BH CV ECHO MEAS - MV P1/2T: 125.7 MSEC
BH CV ECHO MEAS - MV V2 MAX: 148.3 CM/SEC
BH CV ECHO MEAS - MV V2 MEAN: 91.6 CM/SEC
BH CV ECHO MEAS - MV V2 VTI: 39 CM
BH CV ECHO MEAS - MVA P1/2T LCG: 1.5 CM^2
BH CV ECHO MEAS - MVA(P1/2T): 1.8 CM^2
BH CV ECHO MEAS - MVA(VTI): 1.2 CM^2
BH CV ECHO MEAS - PA ACC TIME: 0.1 SEC
BH CV ECHO MEAS - PA MAX PG (FULL): 1.1 MMHG
BH CV ECHO MEAS - PA MAX PG: 2 MMHG
BH CV ECHO MEAS - PA PR(ACCEL): 35 MMHG
BH CV ECHO MEAS - PA V2 MAX: 70.8 CM/SEC
BH CV ECHO MEAS - PULM DIAS VEL: 32.6 CM/SEC
BH CV ECHO MEAS - PULM S/D: 1.5
BH CV ECHO MEAS - PULM SYS VEL: 49.7 CM/SEC
BH CV ECHO MEAS - PVA(V,A): 1.4 CM^2
BH CV ECHO MEAS - PVA(V,D): 1.4 CM^2
BH CV ECHO MEAS - QP/QS: 0.33
BH CV ECHO MEAS - RAP SYSTOLE: 3 MMHG
BH CV ECHO MEAS - RV MAX PG: 0.88 MMHG
BH CV ECHO MEAS - RV MEAN PG: 0.41 MMHG
BH CV ECHO MEAS - RV V1 MAX: 47 CM/SEC
BH CV ECHO MEAS - RV V1 MEAN: 28.8 CM/SEC
BH CV ECHO MEAS - RV V1 VTI: 7.5 CM
BH CV ECHO MEAS - RVOT AREA: 2.1 CM^2
BH CV ECHO MEAS - RVOT DIAM: 1.6 CM
BH CV ECHO MEAS - RVSP: 24.7 MMHG
BH CV ECHO MEAS - SI(AO): 84.4 ML/M^2
BH CV ECHO MEAS - SI(CUBED): 31.5 ML/M^2
BH CV ECHO MEAS - SI(LVOT): 29.1 ML/M^2
BH CV ECHO MEAS - SI(MOD-SP2): 17.6 ML/M^2
BH CV ECHO MEAS - SI(MOD-SP4): 19.4 ML/M^2
BH CV ECHO MEAS - SI(TEICH): 24 ML/M^2
BH CV ECHO MEAS - SUP REN AO DIAM: 2.7 CM
BH CV ECHO MEAS - SV(AO): 139.1 ML
BH CV ECHO MEAS - SV(CUBED): 51.8 ML
BH CV ECHO MEAS - SV(LVOT): 47.9 ML
BH CV ECHO MEAS - SV(MOD-SP2): 29 ML
BH CV ECHO MEAS - SV(MOD-SP4): 32 ML
BH CV ECHO MEAS - SV(RVOT): 15.7 ML
BH CV ECHO MEAS - SV(TEICH): 39.5 ML
BH CV ECHO MEAS - TAPSE (>1.6): 1.5 CM
BH CV ECHO MEAS - TR MAX VEL: 232.9 CM/SEC
BH CV ECHO MEASUREMENTS AVERAGE E/E' RATIO: 15.87
BH CV XLRA - RV BASE: 3.7 CM
BH CV XLRA - RV LENGTH: 6.5 CM
BH CV XLRA - RV MID: 2.7 CM
BH CV XLRA - TDI S': 8.1 CM/SEC
LEFT ATRIUM VOLUME INDEX: 47 ML/M2
MAXIMAL PREDICTED HEART RATE: 150 BPM
SINUS: 3.2 CM
STJ: 3 CM
STRESS TARGET HR: 128 BPM

## 2022-01-12 PROCEDURE — 93306 TTE W/DOPPLER COMPLETE: CPT

## 2022-01-12 PROCEDURE — 93356 MYOCRD STRAIN IMG SPCKL TRCK: CPT

## 2022-01-12 PROCEDURE — 93306 TTE W/DOPPLER COMPLETE: CPT | Performed by: INTERNAL MEDICINE

## 2022-01-12 PROCEDURE — 93356 MYOCRD STRAIN IMG SPCKL TRCK: CPT | Performed by: INTERNAL MEDICINE

## 2022-01-12 PROCEDURE — 25010000002 PERFLUTREN (DEFINITY) 8.476 MG IN SODIUM CHLORIDE (PF) 0.9 % 10 ML INJECTION: Performed by: NURSE PRACTITIONER

## 2022-01-12 RX ADMIN — PERFLUTREN 4 ML: 6.52 INJECTION, SUSPENSION INTRAVENOUS at 10:09

## 2022-01-20 RX ORDER — APIXABAN 5 MG/1
TABLET, FILM COATED ORAL
Qty: 180 TABLET | Refills: 2 | Status: SHIPPED | OUTPATIENT
Start: 2022-01-20 | End: 2022-09-12

## 2022-02-14 ENCOUNTER — TRANSCRIBE ORDERS (OUTPATIENT)
Dept: ADMINISTRATIVE | Facility: HOSPITAL | Age: 72
End: 2022-02-14

## 2022-02-14 DIAGNOSIS — M81.0 SENILE OSTEOPOROSIS: Primary | ICD-10-CM

## 2022-03-16 NOTE — PROGRESS NOTES
Date of Office Visit: 2020  Encounter Provider: RICHA Danielle  Place of Service: [facility] CARDIOLOGY  Patient Name: Rasheeda Balderas  :1950    Chief Complaint   Patient presents with   • Shortness of Breath     FOLLOW UP   • Fatigue   :     HPI: Rasheeda Balderas is a 70 y.o. female, new to me, who presents today for follow-up.  Old records have been obtained and reviewed by me.  She is a patient of Dr. Hand's with a past cardiac history significant for mitral regurgitation.  In May 2019, she underwent a mitral valve repair with a 30 mm Compa Hooper partial annuloplasty ring and Drew stitch.  This was performed in [place].  At the time of surgery, she developed some paroxysmal atrial fibrillation and was temporarily on amiodarone and warfarin.  These medications were ultimately discontinued.   She was last seen in the office by Dr. Hand on 10/25/2019 at which time she was overall doing well.  She reported an episode of weakness that Dr. Hand felt was related to hypotension.  No changes were made to her medical regimen, and she was advised to follow-up in 1 year.   I am seeing her today at the request of her PCP for complaints of shortness of breath.  She states that every morning, she walks about 3 miles.  When it started getting really hot outside, she started getting up earlier.  A couple of times recently, she has really struggled making at home.  She is experiencing fatigue, shortness of breath, and dizziness.  She is sweating more than usual.  She notices it does not happen when she walks shorter distances.  This morning she did the elliptical for 15 minutes before her walk which overall she tolerated well.  She did have a little shortness of breath.  She has also noted more severe shortness of breath when climbing the stairs.  She denies any chest pain, palpitations, edema, or syncope.  She denies any PND or orthopnea.  Her weight is been stable.  Blood pressure is elevated at today's visit  Patient instructed to monitor his BP regularly at home  If BP remains elevated, follow up in the office as soon as possible for medication adjustment  Continue exercise and healthy diet habits  Monitor sodium intake  Continue Losartan 100 mg daily  Follow up in 6 months   Overall she eats to pretty decent diet.      Past Medical History:   Diagnosis Date   • Arthritis    • CAD (coronary artery disease)    • Cholelithiasis    • Heartburn    • Mitral valve prolapse syndrome    • Myocardial infarction (CMS/HCC)     pt is not aware of history of MI   • Osteoporosis        Past Surgical History:   Procedure Laterality Date   • CARDIAC CATHETERIZATION N/A 3/7/2019    Procedure: Right and Left Heart Cath;  Surgeon: Cam Hand MD;  Location:  [facility] CATH INVASIVE LOCATION;  Service: Cardiology   • CARDIAC CATHETERIZATION N/A 3/7/2019    Procedure: Coronary angiography;  Surgeon: Cam Hand MD;  Location:  [facility] CATH INVASIVE LOCATION;  Service: Cardiology   • CARDIAC CATHETERIZATION N/A 3/7/2019    Procedure: Left ventriculography;  Surgeon: Cam Hand MD;  Location:  [facility] CATH INVASIVE LOCATION;  Service: Cardiology   • LAPAROSCOPIC CHOLECYSTECTOMY W/ CHOLANGIOGRAPHY     • SHOULDER SURGERY Left        Social History     Socioeconomic History   • Marital status:      Spouse name: Not on file   • Number of children: Not on file   • Years of education: Not on file   • Highest education level: Not on file   Occupational History   • Occupation: professor   Tobacco Use   • Smoking status: Former Smoker   • Smokeless tobacco: Never Used   • Tobacco comment: NO CAFFEINE USE   Substance and Sexual Activity   • Alcohol use: Yes     Alcohol/week: 3.0 standard drinks     Types: 1 Glasses of wine, 1 Cans of beer, 1 Shots of liquor per week     Comment: weekends-socially   • Drug use: No   • Sexual activity: Defer       Family History   Problem Relation Age of Onset   • Brain cancer Mother    • Heart disease Father    • Brain cancer Father    • Stomach cancer Maternal Grandmother    • Cancer Maternal Grandfather    • No Known Problems Paternal Grandmother    • No Known Problems Paternal Grandfather        Review of Systems   Constitution: Positive for diaphoresis and  "malaise/fatigue. Negative for chills and fever.   Cardiovascular: Positive for dyspnea on exertion. Negative for chest pain, leg swelling, near-syncope, orthopnea, palpitations, paroxysmal nocturnal dyspnea and syncope.   Respiratory: Negative for cough and shortness of breath.    Musculoskeletal: Negative for joint pain, joint swelling and myalgias.   Gastrointestinal: Negative for abdominal pain, diarrhea, melena, nausea and vomiting.   Genitourinary: Negative for frequency and hematuria.   Neurological: Positive for dizziness. Negative for light-headedness, numbness, paresthesias and seizures.   Allergic/Immunologic: Negative.    All other systems reviewed and are negative.      Allergies   Allergen Reactions   • No Known Drug Allergy          Current Outpatient Medications:   •  acetaminophen (TYLENOL) 500 MG tablet, Take 500 mg by mouth every 6 (six) hours as needed for mild pain (1-3)., Disp: , Rfl:   •  aspirin 81 MG chewable tablet, Chew 81 mg daily., Disp: , Rfl:   •  b complex-C-folic acid 1 MG capsule, Take 1 capsule by mouth Daily., Disp: , Rfl:   •  loratadine (Claritin) 10 MG tablet, Take 10 mg by mouth Daily., Disp: , Rfl:   •  melatonin 5 MG tablet tablet, Take 5 mg by mouth Every Night., Disp: , Rfl:   •  metaxalone (SKELAXIN) 800 MG tablet, Take 800 mg by mouth As Needed for Muscle Spasms., Disp: , Rfl:   •  zoledronic acid (RECLAST) 5 MG/100ML solution injection, Infuse 5 mg into a venous catheter 1 (one) time. 1 time yearly, Disp: , Rfl:       Objective:     Vitals:    07/24/20 1527 07/24/20 1535   BP: 118/60 124/68   BP Location: Right arm Left arm   Patient Position: Sitting Sitting   Pulse: 73    Resp: 18    SpO2: 97%    Weight: 57.6 kg (127 lb)    Height: 165.1 cm (65\")      Body mass index is 21.13 kg/m².    PHYSICAL EXAM:    Physical Exam   Constitutional: She is oriented to person, place, and time. She appears well-developed and well-nourished. No distress.   HENT:   Head: Normocephalic " and atraumatic.   Eyes: Pupils are equal, round, and reactive to light.   Neck: No JVD present. No thyromegaly present.   Cardiovascular: Normal rate, regular rhythm, normal heart sounds and intact distal pulses. Frequent extrasystoles are present.   No murmur heard.  Pulmonary/Chest: Effort normal. No respiratory distress. She has wheezes in the left upper field and the left middle field.   Abdominal: Soft. Bowel sounds are normal. She exhibits no distension. There is no splenomegaly or hepatomegaly. There is no tenderness.   Musculoskeletal: Normal range of motion. She exhibits no edema.   Neurological: She is alert and oriented to person, place, and time.   Skin: Skin is warm and dry. She is not diaphoretic. No erythema.   Psychiatric: She has a normal mood and affect. Her behavior is normal. Judgment normal.         ECG 12 Lead  Date/Time: 7/24/2020 3:41 PM  Performed by: Teresita Leone APRN  Authorized by: Teresita Leone APRN   Comparison: compared with previous ECG from 10/25/2019  Similar to previous ECG  Rhythm: sinus rhythm  Ectopy: atrial premature contractions  Rate: normal  BPM: 96  T inversion: V1 and V2  T flattening: aVL  Other findings: non-specific ST-T wave changes and left ventricular hypertrophy    Clinical impression: abnormal EKG  Comments: Indication: Status post mitral valve repair              Assessment:       Diagnosis Plan   1. S/P MVR (mitral valve repair)  ECG 12 Lead    TSH   2. Shortness of breath  proBNP    Comprehensive Metabolic Panel    CBC (No Diff)    XR Chest 2 View    Adult Transthoracic Echo Complete W/ Cont if Necessary Per Protocol    TSH     Orders Placed This Encounter   Procedures   • XR Chest 2 View     Standing Status:   Future     Standing Expiration Date:   7/24/2021     Order Specific Question:   Reason for Exam:     Answer:   shortness of breath   • proBNP     Standing Status:   Future     Standing Expiration Date:   7/24/2021   • Comprehensive  Metabolic Panel     Standing Status:   Future     Standing Expiration Date:   7/24/2021   • CBC (No Diff)     Standing Status:   Future     Standing Expiration Date:   7/24/2021   • TSH     Standing Status:   Future     Standing Expiration Date:   7/24/2021   • ECG 12 Lead     This order was created via procedure documentation   • Adult Transthoracic Echo Complete W/ Cont if Necessary Per Protocol     Standing Status:   Future     Standing Expiration Date:   7/24/2021     Order Specific Question:   Reason for exam?     Answer:   Dyspnea     Order Specific Question:   Reason for exam?     Answer:   Valvular Function          Plan:       Overall I think she is stable.  She denies any angina.  She does not appear to me to be in any acute heart failure.  Her EKG is stable.  Her heart rate is a little quicker than it was last year and her heart seems to be beating very hard on exam and she has wheezes in the left lung.  I would like to order some baseline labs.  I am going to check a TSH, CBC, CMP, and proBNP.  I am also going to order a chest x-ray.  Additionally, I am going to order an echocardiogram just to ensure everything looks stable with her valve.  I discussed the plan of care with Dr. Hand, and he is in agreement.  Further recommendations will be made pending these results.      As always, it has been a pleasure to participate in your patient's care.      Sincerely,         RICHA Christine

## 2022-04-07 RX ORDER — ZOLEDRONIC ACID 5 MG/100ML
5 INJECTION, SOLUTION INTRAVENOUS ONCE
Status: CANCELLED | OUTPATIENT
Start: 2022-04-11

## 2022-04-11 ENCOUNTER — HOSPITAL ENCOUNTER (OUTPATIENT)
Dept: INFUSION THERAPY | Facility: HOSPITAL | Age: 72
Setting detail: INFUSION SERIES
Discharge: HOME | End: 2022-04-11

## 2022-04-11 VITALS
TEMPERATURE: 95.3 F | RESPIRATION RATE: 20 BRPM | BODY MASS INDEX: 21.63 KG/M2 | OXYGEN SATURATION: 100 % | HEART RATE: 86 BPM | SYSTOLIC BLOOD PRESSURE: 103 MMHG | WEIGHT: 130 LBS | DIASTOLIC BLOOD PRESSURE: 75 MMHG

## 2022-04-11 DIAGNOSIS — M81.0 AGE RELATED OSTEOPOROSIS, UNSPECIFIED PATHOLOGICAL FRACTURE PRESENCE: Primary | ICD-10-CM

## 2022-04-11 PROCEDURE — 25010000002 ZOLEDRONIC ACID 5 MG/100ML SOLUTION: Performed by: INTERNAL MEDICINE

## 2022-04-11 PROCEDURE — 96365 THER/PROPH/DIAG IV INF INIT: CPT

## 2022-04-11 RX ORDER — ZOLEDRONIC ACID 5 MG/100ML
5 INJECTION, SOLUTION INTRAVENOUS ONCE
Status: COMPLETED | OUTPATIENT
Start: 2022-04-11 | End: 2022-04-11

## 2022-04-11 RX ORDER — ZOLEDRONIC ACID 5 MG/100ML
5 INJECTION, SOLUTION INTRAVENOUS ONCE
Status: CANCELLED | OUTPATIENT
Start: 2022-04-11

## 2022-04-11 RX ADMIN — ZOLEDRONIC ACID 5 MG: 5 INJECTION, SOLUTION INTRAVENOUS at 09:33

## 2022-04-11 NOTE — PROGRESS NOTES
Labs reviewed, calcium is 9.6 and within normal limits to receive Reclast.   Crockcroft- Gault score calculated 58.069        1050: Pt has had Reclast in the past. Pt tolerated well. Discharged and ambulated out of ACU with a steady gait.

## 2022-05-10 ENCOUNTER — OFFICE VISIT (OUTPATIENT)
Dept: ORTHOPEDIC SURGERY | Facility: CLINIC | Age: 72
End: 2022-05-10

## 2022-05-10 VITALS — HEIGHT: 65 IN | BODY MASS INDEX: 21.91 KG/M2 | TEMPERATURE: 97.1 F | WEIGHT: 131.5 LBS

## 2022-05-10 DIAGNOSIS — Z96.641 STATUS POST RIGHT HIP REPLACEMENT: Primary | ICD-10-CM

## 2022-05-10 PROCEDURE — 99212 OFFICE O/P EST SF 10 MIN: CPT | Performed by: ORTHOPAEDIC SURGERY

## 2022-05-10 PROCEDURE — 73501 X-RAY EXAM HIP UNI 1 VIEW: CPT | Performed by: ORTHOPAEDIC SURGERY

## 2022-05-10 NOTE — PROGRESS NOTES
"Rasheeda Balderas : 1950 MRN: [id number] DATE: 5/10/2022    Chief Complaint:  Follow up right total hip      SUBJECTIVE:Patient returns today for  1 year follow up of right total hip replacement. Patient reports doing well with no unusual complaints. Denies any limitations due to the hip.    OBJECTIVE:    Temp 97.1 °F (36.2 °C)   Ht 165.1 cm (65\")   Wt 59.6 kg (131 lb 8 oz)   LMP  (LMP Unknown)   BMI 21.88 kg/m²   Family History   Problem Relation Age of Onset   • Brain cancer Mother    • Heart disease Father    • Brain cancer Father    • Stomach cancer Maternal Grandmother    • Cancer Maternal Grandfather    • No Known Problems Paternal Grandmother    • No Known Problems Paternal Grandfather    • Malig Hyperthermia Neg Hx      Past Medical History:   Diagnosis Date   • Arthritis    • Atrial fibrillation (HCC)    • Bone spur    • CAD (coronary artery disease)    • Heartburn    • Mitral valve prolapse syndrome    • Myocardial infarction (HCC)     pt is not aware of history of MI   • Osteoporosis    • PONV (postoperative nausea and vomiting)    • Right hip pain      Past Surgical History:   Procedure Laterality Date   • CARDIAC CATHETERIZATION N/A 3/7/2019    Procedure: Right and Left Heart Cath;  Surgeon: Cam Hand MD;  Location: [facility] CATH INVASIVE LOCATION;  Service: Cardiology   • CARDIAC CATHETERIZATION N/A 3/7/2019    Procedure: Coronary angiography;  Surgeon: Cam Hand MD;  Location: [facility] CATH INVASIVE LOCATION;  Service: Cardiology   • CARDIAC CATHETERIZATION N/A 3/7/2019    Procedure: Left ventriculography;  Surgeon: Cam Hand MD;  Location: [facility] CATH INVASIVE LOCATION;  Service: Cardiology   • COLONOSCOPY N/A 2021    Procedure: COLONOSCOPY TO CECUM;  Surgeon: Mary Tsai MD;  Location: [facility] ENDOSCOPY;  Service: Gastroenterology;  Laterality: N/A;  PRE: SCREENING, FAMILY HX COLON POLYPS  POST: DIVERTICULOSIS, HEMORRHOIDS   • LAPAROSCOPIC CHOLECYSTECTOMY W/ " CHOLANGIOGRAPHY     • MITRAL VALVE REPLACEMENT     • SHOULDER SURGERY Left    • TOTAL HIP ARTHROPLASTY Right 5/10/2021    Procedure: TOTAL HIP ARTHROPLASTY ANTERIOR WITH HANA TABLE;  Surgeon: Jasmeet Graham MD;  Location: [facility];  Service: Orthopedics;  Laterality: Right;     Social History     Socioeconomic History   • Marital status:    Tobacco Use   • Smoking status: Former Smoker     Packs/day: 0.50     Types: Cigarettes     Quit date:      Years since quittin.3   • Smokeless tobacco: Never Used   Vaping Use   • Vaping Use: Never used   Substance and Sexual Activity   • Alcohol use: Yes     Alcohol/week: 3.0 standard drinks     Types: 1 Glasses of wine, 1 Cans of beer, 1 Shots of liquor per week     Comment: 8 PER WEEK   • Drug use: No   • Sexual activity: Defer       Review of Systems: 14 point review of systems performed pertinent positives and negatives discussed above, all other systems are negative    Exam:. The incision is well healed. Range of motion is good without irritability. The calf is soft and nontender with a negative Homans sign. Alignment is neutral. Leg lengths are equal. Good hip flexion and abduction strength.Walks with nonantalgic gait. Intact to light touch with palpable distal pulses.     DIAGNOSTIC STUDIES  Xrays:Xrays 2 view right hip AP and lateral were ordered and reviewed for evaluation of total hip which demonstrate a well positioned TUNDE without complicating factors.  In comparison to previous films are no changes.    ASSESSMENT:     Follow up right hip replacement. doing well       PLAN:   Continue activities as tolerated  Follow up PRN    Jasmeet Graham MD  5/10/2022

## 2022-09-12 RX ORDER — APIXABAN 5 MG/1
TABLET, FILM COATED ORAL
Qty: 180 TABLET | Refills: 3 | Status: SHIPPED | OUTPATIENT
Start: 2022-09-12

## 2022-09-13 RX ORDER — SPIRONOLACTONE 25 MG/1
TABLET ORAL
Qty: 45 TABLET | Refills: 3 | Status: SHIPPED | OUTPATIENT
Start: 2022-09-13

## 2022-09-13 RX ORDER — DIGOXIN 125 MCG
TABLET ORAL
Qty: 90 TABLET | Refills: 3 | Status: SHIPPED | OUTPATIENT
Start: 2022-09-13

## 2022-09-13 RX ORDER — METOPROLOL SUCCINATE 25 MG/1
TABLET, EXTENDED RELEASE ORAL
Qty: 30 TABLET | Refills: 5 | Status: SHIPPED | OUTPATIENT
Start: 2022-09-13

## 2023-01-03 ENCOUNTER — OFFICE VISIT (OUTPATIENT)
Dept: CARDIOLOGY | Facility: CLINIC | Age: 73
End: 2023-01-03
Payer: MEDICARE

## 2023-01-03 VITALS
BODY MASS INDEX: 22.16 KG/M2 | DIASTOLIC BLOOD PRESSURE: 60 MMHG | HEIGHT: 65 IN | SYSTOLIC BLOOD PRESSURE: 100 MMHG | WEIGHT: 133 LBS | HEART RATE: 78 BPM

## 2023-01-03 DIAGNOSIS — Z98.890 S/P MVR (MITRAL VALVE REPAIR): ICD-10-CM

## 2023-01-03 DIAGNOSIS — I48.0 PAROXYSMAL ATRIAL FIBRILLATION: ICD-10-CM

## 2023-01-03 DIAGNOSIS — I42.8 NICM (NONISCHEMIC CARDIOMYOPATHY): Primary | ICD-10-CM

## 2023-01-03 PROCEDURE — 93000 ELECTROCARDIOGRAM COMPLETE: CPT | Performed by: INTERNAL MEDICINE

## 2023-01-03 PROCEDURE — 99214 OFFICE O/P EST MOD 30 MIN: CPT | Performed by: INTERNAL MEDICINE

## 2023-01-03 PROCEDURE — 1160F RVW MEDS BY RX/DR IN RCRD: CPT | Performed by: INTERNAL MEDICINE

## 2023-01-03 PROCEDURE — 1159F MED LIST DOCD IN RCRD: CPT | Performed by: INTERNAL MEDICINE

## 2023-01-03 NOTE — PROGRESS NOTES
Date of Office Visit: 23  Encounter Provider: Cam Hand MD  Place of Service: [facility] CARDIOLOGY  Patient Name: Rasheeda Balderas  :1950  [id number]    Chief Complaint   Patient presents with   • Coronary Artery Disease   :     HPI: Rasheeda Balderas is a 72 y.o. female  initially had paroxysmal A. fib we found that she had severe mitral insufficiency no significant coronary disease preserved LV function at that time and in 2017 she went to the [facility] where she had a ring annuloplasty and an Drew stitch placed in her mitral valve.  She did well until 2020 when she showed up with shortness of breath elevated proBNP and reduced LV function with an EF of about 40%.  She was started on some medical therapy for her cardiomyopathy but she cannot tolerate much because she does not have a much in the way of blood pressure and her last echo last year the EF was about 45%    She is here for follow-up and is doing well no shortness of breath no chest pain no palpitations little bit of dizziness sometimes when she stands up no change in her weight no syncope    Past Medical History:   Diagnosis Date   • Arthritis    • Atrial fibrillation (HCC)    • Bone spur    • CAD (coronary artery disease)    • Heartburn    • Mitral valve prolapse syndrome    • Myocardial infarction (HCC)     pt is not aware of history of MI   • Osteoporosis    • PONV (postoperative nausea and vomiting)    • Right hip pain        Past Surgical History:   Procedure Laterality Date   • CARDIAC CATHETERIZATION N/A 3/7/2019    Procedure: Right and Left Heart Cath;  Surgeon: Cam Hand MD;  [facility] CATH INVASIVE LOCATION;  Service: Cardiology   • CARDIAC CATHETERIZATION N/A 3/7/2019    Procedure: Coronary angiography;  Surgeon: Cam Hand MD;  Location:  [facility] CATH INVASIVE LOCATION;  Service: Cardiology   • CARDIAC CATHETERIZATION N/A 3/7/2019    Procedure: Left  ventriculography;  Surgeon: Cam Hand MD;  Location: [facility] CATH INVASIVE LOCATION;  Service: Cardiology   • COLONOSCOPY N/A 2021    Procedure: COLONOSCOPY TO CECUM;  Surgeon: Mary Tsai MD;  Location: [facility] ENDOSCOPY;  Service: Gastroenterology;  Laterality: N/A;  PRE: SCREENING, FAMILY HX COLON POLYPS  POST: DIVERTICULOSIS, HEMORRHOIDS   • LAPAROSCOPIC CHOLECYSTECTOMY W/ CHOLANGIOGRAPHY     • MITRAL VALVE REPLACEMENT     • SHOULDER SURGERY Left    • TOTAL HIP ARTHROPLASTY Right 5/10/2021    Procedure: TOTAL HIP ARTHROPLASTY ANTERIOR WITH HANA TABLE;  Surgeon: Jasmeet Graham MD;  Location: [facility] MAIN OR;  Service: Orthopedics;  Laterality: Right;       Social History     Socioeconomic History   • Marital status:    Tobacco Use   • Smoking status: Former     Packs/day: 0.50     Types: Cigarettes     Quit date:      Years since quittin.0   • Smokeless tobacco: Never   Vaping Use   • Vaping Use: Never used   Substance and Sexual Activity   • Alcohol use: Yes     Alcohol/week: 3.0 standard drinks     Types: 1 Glasses of wine, 1 Cans of beer, 1 Shots of liquor per week     Comment: 8 PER WEEK   • Drug use: No   • Sexual activity: Defer       Family History   Problem Relation Age of Onset   • Brain cancer Mother    • Heart disease Father    • Brain cancer Father    • Stomach cancer Maternal Grandmother    • Cancer Maternal Grandfather    • No Known Problems Paternal Grandmother    • No Known Problems Paternal Grandfather    • Malig Hyperthermia Neg Hx        Review of Systems   Constitutional: Negative for decreased appetite, fever, malaise/fatigue and weight loss.   HENT: Negative for nosebleeds.    Eyes: Negative for double vision.   Cardiovascular: Negative for chest pain, claudication, cyanosis, dyspnea on exertion, irregular heartbeat, leg swelling, near-syncope, orthopnea, palpitations, paroxysmal nocturnal dyspnea and syncope.   Respiratory: Negative for cough, hemoptysis and  shortness of breath.    Hematologic/Lymphatic: Negative for bleeding problem.   Skin: Negative for rash.   Musculoskeletal: Negative for falls and myalgias.   Gastrointestinal: Negative for hematochezia, jaundice, melena, nausea and vomiting.   Genitourinary: Negative for hematuria.   Neurological: Negative for dizziness and seizures.   Psychiatric/Behavioral: Negative for altered mental status and memory loss.       Allergies   Allergen Reactions   • Strawberry Swelling         Current Outpatient Medications:   •  acetaminophen (TYLENOL) 500 MG tablet, Take 500 mg by mouth Every 6 (Six) Hours As Needed for Mild Pain ., Disp: , Rfl:   •  digoxin (LANOXIN) 125 MCG tablet, TAKE 1 TABLET DAILY, Disp: 90 tablet, Rfl: 3  •  Eliquis 5 MG tablet tablet, TAKE 1 TABLET EVERY 12 HOURS, Disp: 180 tablet, Rfl: 3  •  loratadine (CLARITIN) 10 MG tablet, Take 10 mg by mouth Daily., Disp: , Rfl:   •  metaxalone (SKELAXIN) 800 MG tablet, Take 800 mg by mouth As Needed for Muscle Spasms., Disp: , Rfl:   •  metoprolol succinate XL (TOPROL-XL) 25 MG 24 hr tablet, TAKE ONE-HALF (1/2) TABLET DAILY, Disp: 30 tablet, Rfl: 5  •  spironolactone (ALDACTONE) 25 MG tablet, TAKE ONE-HALF (1/2) TABLET DAILY, Disp: 45 tablet, Rfl: 3  •  zoledronic acid (RECLAST) 5 MG/100ML solution infusion, Infuse 5 mg into a venous catheter 1 (One) Time. 1 time yearly, Disp: , Rfl:   No current facility-administered medications for this visit.    Facility-Administered Medications Ordered in Other Visits:   •  Chlorhexidine Gluconate 2 % pads 2 each, 2 pad, Apply externally, BID, Jasmeet Graham MD      Objective:     Vitals:    01/03/23 1254   BP: 100/60   Pulse: 78   Weight: 60.3 kg (133 lb)   Height: 165.1 cm (65\")     Body mass index is 22.13 kg/m².    Constitutional:       Appearance: Well-developed.   Eyes:      General: No scleral icterus.  HENT:      Head: Normocephalic.   Neck:      Thyroid: No thyromegaly.      Vascular: No JVD.      Lymphadenopathy: No  cervical adenopathy.   Pulmonary:      Effort: Pulmonary effort is normal.      Breath sounds: Normal breath sounds. No wheezing. No rales.   Cardiovascular:      Normal rate. Irregularly irregular rhythm.      No gallop.   Edema:     Peripheral edema absent.   Abdominal:      Palpations: Abdomen is soft.      Tenderness: There is no abdominal tenderness.   Musculoskeletal: Normal range of motion. Skin:     General: Skin is warm and dry.      Findings: No rash.   Neurological:      Mental Status: Alert and oriented to person, place, and time.           ECG 12 Lead    Date/Time: 1/3/2023 1:08 PM  Performed by: Cam Hand MD  Authorized by: Cam Hand MD   Comparison: compared with previous ECG   Similar to previous ECG  Rhythm: atrial fibrillation  Other findings: non-specific ST-T wave changes    Clinical impression: abnormal EKG             Assessment:       Diagnosis Plan   1. NICM (nonischemic cardiomyopathy) (HCC)        2. S/P MVR (mitral valve repair)        3. Paroxysmal atrial fibrillation (HCC)               Plan:       I think she is doing well she has class I heart failure symptoms we talked about potentially reducing the Aldactone but were both interested in keeping her LV function better so organ to tolerate a little bit of the dizziness unless it worsens.  We will have her come back and see us in a year and at that point we are going to get an echocardiogram again    No follow-ups on file.     As always, it has been a pleasure to participate in your patient's care.      Sincerely,       Cam Hand MD

## 2023-02-03 ENCOUNTER — APPOINTMENT (OUTPATIENT)
Dept: WOMENS IMAGING | Facility: HOSPITAL | Age: 73
End: 2023-02-03
Payer: MEDICARE

## 2023-02-03 PROCEDURE — 77067 SCR MAMMO BI INCL CAD: CPT | Performed by: RADIOLOGY

## 2023-02-03 PROCEDURE — 77063 BREAST TOMOSYNTHESIS BI: CPT | Performed by: RADIOLOGY

## 2023-03-08 ENCOUNTER — TRANSCRIBE ORDERS (OUTPATIENT)
Dept: ADMINISTRATIVE | Facility: HOSPITAL | Age: 73
End: 2023-03-08
Payer: MEDICARE

## 2023-03-08 DIAGNOSIS — M81.0 OSTEOPOROSIS OF MULTIPLE SITES: Primary | ICD-10-CM

## 2023-03-21 ENCOUNTER — TRANSCRIBE ORDERS (OUTPATIENT)
Dept: WOMENS IMAGING | Facility: HOSPITAL | Age: 73
End: 2023-03-21
Payer: MEDICARE

## 2023-03-21 ENCOUNTER — HOSPITAL ENCOUNTER (OUTPATIENT)
Dept: PET IMAGING | Facility: HOSPITAL | Age: 73
Discharge: HOME | End: 2023-03-21
Admitting: INTERNAL MEDICINE
Payer: MEDICARE

## 2023-03-21 DIAGNOSIS — M81.0 HIGH RISK FOR FRACTURE DUE TO OSTEOPOROSIS BY DEXA SCAN: Primary | ICD-10-CM

## 2023-03-21 PROCEDURE — 77080 DXA BONE DENSITY AXIAL: CPT | Performed by: RADIOLOGY

## 2023-03-21 PROCEDURE — 77080 DXA BONE DENSITY AXIAL: CPT

## 2023-04-14 PROBLEM — M85.80 OSTEOPENIA: Status: ACTIVE | Noted: 2023-04-14

## 2023-04-14 RX ORDER — ZOLEDRONIC ACID 5 MG/100ML
5 INJECTION, SOLUTION INTRAVENOUS ONCE
Status: CANCELLED | OUTPATIENT
Start: 2023-04-17

## 2023-04-17 ENCOUNTER — HOSPITAL ENCOUNTER (OUTPATIENT)
Dept: INFUSION THERAPY | Facility: HOSPITAL | Age: 73
Discharge: HOME | End: 2023-04-17
Admitting: INTERNAL MEDICINE
Payer: MEDICARE

## 2023-04-17 VITALS
HEART RATE: 94 BPM | WEIGHT: 130 LBS | RESPIRATION RATE: 18 BRPM | TEMPERATURE: 96.1 F | BODY MASS INDEX: 21.63 KG/M2 | OXYGEN SATURATION: 100 % | DIASTOLIC BLOOD PRESSURE: 83 MMHG | SYSTOLIC BLOOD PRESSURE: 112 MMHG

## 2023-04-17 DIAGNOSIS — M81.0 AGE RELATED OSTEOPOROSIS, UNSPECIFIED PATHOLOGICAL FRACTURE PRESENCE: ICD-10-CM

## 2023-04-17 DIAGNOSIS — M85.80 OSTEOPENIA, UNSPECIFIED LOCATION: Primary | ICD-10-CM

## 2023-04-17 LAB
ALBUMIN SERPL-MCNC: 4.3 G/DL (ref 3.5–5.2)
ALBUMIN/GLOB SERPL: 2 G/DL
ALP SERPL-CCNC: 62 U/L (ref 39–117)
ALT SERPL W P-5'-P-CCNC: 14 U/L (ref 1–33)
ANION GAP SERPL CALCULATED.3IONS-SCNC: 12.1 MMOL/L (ref 5–15)
AST SERPL-CCNC: 15 U/L (ref 1–32)
BILIRUB SERPL-MCNC: 0.5 MG/DL (ref 0–1.2)
BUN SERPL-MCNC: 21 MG/DL (ref 8–23)
BUN/CREAT SERPL: 25.6 (ref 7–25)
CALCIUM SPEC-SCNC: 9.3 MG/DL (ref 8.6–10.5)
CHLORIDE SERPL-SCNC: 103 MMOL/L (ref 98–107)
CO2 SERPL-SCNC: 20.9 MMOL/L (ref 22–29)
CREAT SERPL-MCNC: 0.82 MG/DL (ref 0.57–1)
EGFRCR SERPLBLD CKD-EPI 2021: 76.6 ML/MIN/1.73
GLOBULIN UR ELPH-MCNC: 2.1 GM/DL
GLUCOSE SERPL-MCNC: 120 MG/DL (ref 65–99)
POTASSIUM SERPL-SCNC: 4.1 MMOL/L (ref 3.5–5.2)
PROT SERPL-MCNC: 6.4 G/DL (ref 6–8.5)
SODIUM SERPL-SCNC: 136 MMOL/L (ref 136–145)

## 2023-04-17 PROCEDURE — 96374 THER/PROPH/DIAG INJ IV PUSH: CPT

## 2023-04-17 PROCEDURE — 36415 COLL VENOUS BLD VENIPUNCTURE: CPT

## 2023-04-17 PROCEDURE — 80053 COMPREHEN METABOLIC PANEL: CPT | Performed by: INTERNAL MEDICINE

## 2023-04-17 PROCEDURE — 25010000002 ZOLEDRONIC ACID 5 MG/100ML SOLUTION: Performed by: INTERNAL MEDICINE

## 2023-04-17 RX ORDER — ZOLEDRONIC ACID 5 MG/100ML
5 INJECTION, SOLUTION INTRAVENOUS ONCE
Status: COMPLETED | OUTPATIENT
Start: 2023-04-17 | End: 2023-04-17

## 2023-04-17 RX ORDER — ZOLEDRONIC ACID 5 MG/100ML
5 INJECTION, SOLUTION INTRAVENOUS ONCE
Status: CANCELLED | OUTPATIENT
Start: 2023-04-17

## 2023-04-17 RX ADMIN — ZOLEDRONIC ACID 5 MG: 0.05 INJECTION, SOLUTION INTRAVENOUS at 10:13

## 2023-10-09 RX ORDER — DIGOXIN 125 MCG
TABLET ORAL
Qty: 90 TABLET | Refills: 3 | Status: SHIPPED | OUTPATIENT
Start: 2023-10-09

## 2023-10-09 RX ORDER — APIXABAN 5 MG/1
TABLET, FILM COATED ORAL
Qty: 180 TABLET | Refills: 3 | Status: SHIPPED | OUTPATIENT
Start: 2023-10-09

## 2023-10-09 RX ORDER — SPIRONOLACTONE 25 MG/1
TABLET ORAL
Qty: 45 TABLET | Refills: 3 | Status: SHIPPED | OUTPATIENT
Start: 2023-10-09

## 2023-11-18 ENCOUNTER — HOSPITAL ENCOUNTER (OUTPATIENT)
Facility: HOSPITAL | Age: 73
Setting detail: OBSERVATION
Discharge: HOME | End: 2023-11-20
Attending: EMERGENCY MEDICINE | Admitting: HOSPITALIST
Payer: MEDICARE

## 2023-11-18 ENCOUNTER — APPOINTMENT (OUTPATIENT)
Dept: GENERAL RADIOLOGY | Facility: HOSPITAL | Age: 73
End: 2023-11-18
Payer: MEDICARE

## 2023-11-18 ENCOUNTER — APPOINTMENT (OUTPATIENT)
Dept: CT IMAGING | Facility: HOSPITAL | Age: 73
End: 2023-11-18
Payer: MEDICARE

## 2023-11-18 DIAGNOSIS — S32.591A: ICD-10-CM

## 2023-11-18 DIAGNOSIS — S01.111A EYEBROW LACERATION, RIGHT, INITIAL ENCOUNTER: ICD-10-CM

## 2023-11-18 DIAGNOSIS — S82.892A CLOSED FRACTURE OF LEFT ANKLE, INITIAL ENCOUNTER: Primary | ICD-10-CM

## 2023-11-18 PROBLEM — S82.899A ANKLE FRACTURE: Status: ACTIVE | Noted: 2023-11-18

## 2023-11-18 LAB
ALBUMIN SERPL-MCNC: 4.3 G/DL (ref 3.5–5.2)
ALBUMIN/GLOB SERPL: 2.2 G/DL
ALP SERPL-CCNC: 62 U/L (ref 39–117)
ALT SERPL W P-5'-P-CCNC: 19 U/L (ref 1–33)
ANION GAP SERPL CALCULATED.3IONS-SCNC: 9 MMOL/L (ref 5–15)
AST SERPL-CCNC: 18 U/L (ref 1–32)
BASOPHILS # BLD AUTO: 0.02 10*3/MM3 (ref 0–0.2)
BASOPHILS NFR BLD AUTO: 0.2 % (ref 0–1.5)
BILIRUB SERPL-MCNC: 0.5 MG/DL (ref 0–1.2)
BUN SERPL-MCNC: 15 MG/DL (ref 8–23)
BUN/CREAT SERPL: 22.7 (ref 7–25)
CALCIUM SPEC-SCNC: 9.7 MG/DL (ref 8.6–10.5)
CHLORIDE SERPL-SCNC: 108 MMOL/L (ref 98–107)
CO2 SERPL-SCNC: 25 MMOL/L (ref 22–29)
CREAT SERPL-MCNC: 0.66 MG/DL (ref 0.57–1)
DEPRECATED RDW RBC AUTO: 44.3 FL (ref 37–54)
EGFRCR SERPLBLD CKD-EPI 2021: 93.3 ML/MIN/1.73
EOSINOPHIL # BLD AUTO: 0.07 10*3/MM3 (ref 0–0.4)
EOSINOPHIL NFR BLD AUTO: 0.9 % (ref 0.3–6.2)
ERYTHROCYTE [DISTWIDTH] IN BLOOD BY AUTOMATED COUNT: 12.7 % (ref 12.3–15.4)
GLOBULIN UR ELPH-MCNC: 2 GM/DL
GLUCOSE SERPL-MCNC: 100 MG/DL (ref 65–99)
HCT VFR BLD AUTO: 38.8 % (ref 34–46.6)
HGB BLD-MCNC: 13 G/DL (ref 12–15.9)
IMM GRANULOCYTES # BLD AUTO: 0.05 10*3/MM3 (ref 0–0.05)
IMM GRANULOCYTES NFR BLD AUTO: 0.6 % (ref 0–0.5)
LYMPHOCYTES # BLD AUTO: 1.46 10*3/MM3 (ref 0.7–3.1)
LYMPHOCYTES NFR BLD AUTO: 17.9 % (ref 19.6–45.3)
MCH RBC QN AUTO: 31.9 PG (ref 26.6–33)
MCHC RBC AUTO-ENTMCNC: 33.5 G/DL (ref 31.5–35.7)
MCV RBC AUTO: 95.3 FL (ref 79–97)
MONOCYTES # BLD AUTO: 0.41 10*3/MM3 (ref 0.1–0.9)
MONOCYTES NFR BLD AUTO: 5 % (ref 5–12)
NEUTROPHILS NFR BLD AUTO: 6.16 10*3/MM3 (ref 1.7–7)
NEUTROPHILS NFR BLD AUTO: 75.4 % (ref 42.7–76)
NRBC BLD AUTO-RTO: 0 /100 WBC (ref 0–0.2)
PLATELET # BLD AUTO: 163 10*3/MM3 (ref 140–450)
PMV BLD AUTO: 10.4 FL (ref 6–12)
POTASSIUM SERPL-SCNC: 3.9 MMOL/L (ref 3.5–5.2)
PROT SERPL-MCNC: 6.3 G/DL (ref 6–8.5)
RBC # BLD AUTO: 4.07 10*6/MM3 (ref 3.77–5.28)
SODIUM SERPL-SCNC: 142 MMOL/L (ref 136–145)
TROPONIN T SERPL HS-MCNC: 19 NG/L
WBC NRBC COR # BLD AUTO: 8.17 10*3/MM3 (ref 3.4–10.8)

## 2023-11-18 PROCEDURE — G0378 HOSPITAL OBSERVATION PER HR: HCPCS

## 2023-11-18 PROCEDURE — 25010000002 TETANUS-DIPHTH-ACELL PERTUSSIS 5-2.5-18.5 LF-MCG/0.5 SUSPENSION PREFILLED SYRINGE: Performed by: STUDENT IN AN ORGANIZED HEALTH CARE EDUCATION/TRAINING PROGRAM

## 2023-11-18 PROCEDURE — 96374 THER/PROPH/DIAG INJ IV PUSH: CPT

## 2023-11-18 PROCEDURE — 70450 CT HEAD/BRAIN W/O DYE: CPT

## 2023-11-18 PROCEDURE — 36415 COLL VENOUS BLD VENIPUNCTURE: CPT

## 2023-11-18 PROCEDURE — 90715 TDAP VACCINE 7 YRS/> IM: CPT | Performed by: STUDENT IN AN ORGANIZED HEALTH CARE EDUCATION/TRAINING PROGRAM

## 2023-11-18 PROCEDURE — 73502 X-RAY EXAM HIP UNI 2-3 VIEWS: CPT

## 2023-11-18 PROCEDURE — 84484 ASSAY OF TROPONIN QUANT: CPT | Performed by: STUDENT IN AN ORGANIZED HEALTH CARE EDUCATION/TRAINING PROGRAM

## 2023-11-18 PROCEDURE — 93005 ELECTROCARDIOGRAM TRACING: CPT | Performed by: STUDENT IN AN ORGANIZED HEALTH CARE EDUCATION/TRAINING PROGRAM

## 2023-11-18 PROCEDURE — 25010000002 MORPHINE PER 10 MG: Performed by: STUDENT IN AN ORGANIZED HEALTH CARE EDUCATION/TRAINING PROGRAM

## 2023-11-18 PROCEDURE — 25810000003 SODIUM CHLORIDE 0.9 % SOLUTION: Performed by: INTERNAL MEDICINE

## 2023-11-18 PROCEDURE — 80053 COMPREHEN METABOLIC PANEL: CPT | Performed by: STUDENT IN AN ORGANIZED HEALTH CARE EDUCATION/TRAINING PROGRAM

## 2023-11-18 PROCEDURE — 85025 COMPLETE CBC W/AUTO DIFF WBC: CPT | Performed by: STUDENT IN AN ORGANIZED HEALTH CARE EDUCATION/TRAINING PROGRAM

## 2023-11-18 PROCEDURE — 99284 EMERGENCY DEPT VISIT MOD MDM: CPT

## 2023-11-18 PROCEDURE — 72125 CT NECK SPINE W/O DYE: CPT

## 2023-11-18 PROCEDURE — 73610 X-RAY EXAM OF ANKLE: CPT

## 2023-11-18 PROCEDURE — 90471 IMMUNIZATION ADMIN: CPT | Performed by: STUDENT IN AN ORGANIZED HEALTH CARE EDUCATION/TRAINING PROGRAM

## 2023-11-18 RX ORDER — HYDROMORPHONE HYDROCHLORIDE 1 MG/ML
0.5 INJECTION, SOLUTION INTRAMUSCULAR; INTRAVENOUS; SUBCUTANEOUS
Status: DISCONTINUED | OUTPATIENT
Start: 2023-11-18 | End: 2023-11-20 | Stop reason: HOSPADM

## 2023-11-18 RX ORDER — POLYETHYLENE GLYCOL 3350 17 G/17G
17 POWDER, FOR SOLUTION ORAL DAILY PRN
Status: DISCONTINUED | OUTPATIENT
Start: 2023-11-18 | End: 2023-11-20 | Stop reason: HOSPADM

## 2023-11-18 RX ORDER — METOPROLOL SUCCINATE 25 MG/1
12.5 TABLET, EXTENDED RELEASE ORAL DAILY
Status: DISCONTINUED | OUTPATIENT
Start: 2023-11-19 | End: 2023-11-20 | Stop reason: HOSPADM

## 2023-11-18 RX ORDER — ONDANSETRON 2 MG/ML
4 INJECTION INTRAMUSCULAR; INTRAVENOUS EVERY 6 HOURS PRN
Status: DISCONTINUED | OUTPATIENT
Start: 2023-11-18 | End: 2023-11-20 | Stop reason: HOSPADM

## 2023-11-18 RX ORDER — UREA 10 %
3 LOTION (ML) TOPICAL NIGHTLY PRN
Status: DISCONTINUED | OUTPATIENT
Start: 2023-11-18 | End: 2023-11-20 | Stop reason: HOSPADM

## 2023-11-18 RX ORDER — NALOXONE HCL 0.4 MG/ML
0.4 VIAL (ML) INJECTION
Status: DISCONTINUED | OUTPATIENT
Start: 2023-11-18 | End: 2023-11-20 | Stop reason: HOSPADM

## 2023-11-18 RX ORDER — BISACODYL 5 MG/1
5 TABLET, DELAYED RELEASE ORAL DAILY PRN
Status: DISCONTINUED | OUTPATIENT
Start: 2023-11-18 | End: 2023-11-20 | Stop reason: HOSPADM

## 2023-11-18 RX ORDER — ONDANSETRON 4 MG/1
4 TABLET, FILM COATED ORAL EVERY 6 HOURS PRN
Status: DISCONTINUED | OUTPATIENT
Start: 2023-11-18 | End: 2023-11-20 | Stop reason: HOSPADM

## 2023-11-18 RX ORDER — CETIRIZINE HYDROCHLORIDE 10 MG/1
10 TABLET ORAL DAILY
Status: DISCONTINUED | OUTPATIENT
Start: 2023-11-19 | End: 2023-11-20 | Stop reason: HOSPADM

## 2023-11-18 RX ORDER — SODIUM CHLORIDE 9 MG/ML
75 INJECTION, SOLUTION INTRAVENOUS CONTINUOUS
Status: DISCONTINUED | OUTPATIENT
Start: 2023-11-18 | End: 2023-11-19

## 2023-11-18 RX ORDER — AMOXICILLIN 250 MG
2 CAPSULE ORAL 2 TIMES DAILY
Status: DISCONTINUED | OUTPATIENT
Start: 2023-11-18 | End: 2023-11-20 | Stop reason: HOSPADM

## 2023-11-18 RX ORDER — ACETAMINOPHEN 325 MG/1
650 TABLET ORAL EVERY 4 HOURS PRN
Status: DISCONTINUED | OUTPATIENT
Start: 2023-11-18 | End: 2023-11-20 | Stop reason: HOSPADM

## 2023-11-18 RX ORDER — DIGOXIN 125 MCG
125 TABLET ORAL DAILY
Status: DISCONTINUED | OUTPATIENT
Start: 2023-11-18 | End: 2023-11-20 | Stop reason: HOSPADM

## 2023-11-18 RX ORDER — MORPHINE SULFATE 2 MG/ML
4 INJECTION, SOLUTION INTRAMUSCULAR; INTRAVENOUS ONCE
Status: COMPLETED | OUTPATIENT
Start: 2023-11-18 | End: 2023-11-18

## 2023-11-18 RX ORDER — BISACODYL 10 MG
10 SUPPOSITORY, RECTAL RECTAL DAILY PRN
Status: DISCONTINUED | OUTPATIENT
Start: 2023-11-18 | End: 2023-11-20 | Stop reason: HOSPADM

## 2023-11-18 RX ORDER — HYDROCODONE BITARTRATE AND ACETAMINOPHEN 5; 325 MG/1; MG/1
1 TABLET ORAL EVERY 4 HOURS PRN
Status: DISCONTINUED | OUTPATIENT
Start: 2023-11-18 | End: 2023-11-20 | Stop reason: HOSPADM

## 2023-11-18 RX ADMIN — MORPHINE SULFATE 4 MG: 2 INJECTION, SOLUTION INTRAMUSCULAR; INTRAVENOUS at 16:46

## 2023-11-18 RX ADMIN — APIXABAN 5 MG: 5 TABLET, FILM COATED ORAL at 23:37

## 2023-11-18 RX ADMIN — TETANUS TOXOID, REDUCED DIPHTHERIA TOXOID AND ACELLULAR PERTUSSIS VACCINE, ADSORBED 0.5 ML: 5; 2.5; 8; 8; 2.5 SUSPENSION INTRAMUSCULAR at 16:46

## 2023-11-18 RX ADMIN — DIGOXIN 125 MCG: 125 TABLET ORAL at 23:37

## 2023-11-18 RX ADMIN — SODIUM CHLORIDE 75 ML/HR: 9 INJECTION, SOLUTION INTRAVENOUS at 19:38

## 2023-11-18 RX ADMIN — HYDROCODONE BITARTRATE AND ACETAMINOPHEN 1 TABLET: 5; 325 TABLET ORAL at 20:44

## 2023-11-18 NOTE — ED NOTES
.Nursing report ED to floor  Rasheeda Balderas  73 y.o.  female    HPI :   Chief Complaint   Patient presents with    Fall    Hip Pain    Head Injury    Ankle Injury       Admitting doctor:   Jody Cunningham MD    Admitting diagnosis:   The primary encounter diagnosis was Closed fracture of left ankle, initial encounter. Diagnoses of Closed fracture of pubic ramus, right, initial encounter and Eyebrow laceration, right, initial encounter were also pertinent to this visit.    Code status:   Current Code Status       Date Active Code Status Order ID Comments User Context       11/18/2023 1804 CPR (Attempt to Resuscitate) [id number]  Jody Cunningham MD ED        Question Answer    Code Status (Patient has no pulse and is not breathing) CPR (Attempt to Resuscitate)    Medical Interventions (Patient has pulse or is breathing) Full Support                    Allergies:   Strawberry    Isolation:   No active isolations    Intake and Output  No intake or output data in the 24 hours ending 11/18/23 1812    Weight:       11/18/23  1428   Weight: 59 kg (130 lb)       Most recent vitals:   Vitals:    11/18/23 1726 11/18/23 1727 11/18/23 1728 11/18/23 1729   BP:       BP Location:       Patient Position:       Pulse: 62 61 64 61   Resp:       Temp:       TempSrc:       SpO2: 100% 100% 100% 98%   Weight:       Height:           Active LDAs/IV Access:   Lines, Drains & Airways       Active LDAs       Name Placement date Placement time Site Days    Peripheral IV 11/18/23 1429 Anterior;Distal;Right;Upper Arm 11/18/23  1429  Arm  less than 1                    Labs (abnormal labs have a star):   Labs Reviewed   COMPREHENSIVE METABOLIC PANEL - Abnormal; Notable for the following components:       Result Value    Glucose 100 (*)     Chloride 108 (*)     All other components within normal limits    Narrative:     GFR Normal >60  Chronic Kidney Disease <60  Kidney Failure <15    The GFR formula is only valid for adults with  stable renal function between ages 18 and 70.   SINGLE HSTROPONIN T - Abnormal; Notable for the following components:    HS Troponin T 19 (*)     All other components within normal limits    Narrative:     High Sensitive Troponin T Reference Range:  <14.0 ng/L- Negative Female for AMI  <22.0 ng/L- Negative Male for AMI  >=14 - Abnormal Female indicating possible myocardial injury.  >=22 - Abnormal Male indicating possible myocardial injury.   Clinicians would have to utilize clinical acumen, EKG, Troponin, and serial changes to determine if it is an Acute Myocardial Infarction or myocardial injury due to an underlying chronic condition.        CBC WITH AUTO DIFFERENTIAL - Abnormal; Notable for the following components:    Lymphocyte % 17.9 (*)     Immature Grans % 0.6 (*)     All other components within normal limits   CBC AND DIFFERENTIAL    Narrative:     The following orders were created for panel order CBC & Differential.  Procedure                               Abnormality         Status                     ---------                               -----------         ------                     CBC Auto Differential[[id number]]        Abnormal            Final result                 Please view results for these tests on the individual orders.       EKG:   ECG 12 Lead Syncope   Preliminary Result   HEART RATE= 56  bpm   RR Interval= 1071  ms   WV Interval=   ms   P Horizontal Axis=   deg   P Front Axis=   deg   QRSD Interval= 96  ms   QT Interval= 382  ms   QTcB= 369  ms   QRS Axis= 0  deg   T Wave Axis= -19  deg   - ABNORMAL ECG -   Atrial fibrillation   RSR' in V1 or V2, right VCD or RVH   Nonspecific repol abnormality, diffuse leads   Electronically Signed By:    Date and Time of Study: 2023-11-18 16:43:44          Meds given in ED:   Medications   sodium chloride 0.9 % infusion (has no administration in time range)   acetaminophen (TYLENOL) tablet 650 mg (has no administration in time range)    HYDROcodone-acetaminophen (NORCO) 5-325 MG per tablet 1 tablet (has no administration in time range)   HYDROmorphone (DILAUDID) injection 0.5 mg (has no administration in time range)     And   naloxone (NARCAN) injection 0.4 mg (has no administration in time range)   sennosides-docusate (PERICOLACE) 8.6-50 MG per tablet 2 tablet (has no administration in time range)     And   polyethylene glycol (MIRALAX) packet 17 g (has no administration in time range)     And   bisacodyl (DULCOLAX) EC tablet 5 mg (has no administration in time range)     And   bisacodyl (DULCOLAX) suppository 10 mg (has no administration in time range)   ondansetron (ZOFRAN) tablet 4 mg (has no administration in time range)     Or   ondansetron (ZOFRAN) injection 4 mg (has no administration in time range)   melatonin tablet 3 mg (has no administration in time range)   Tetanus-Diphth-Acell Pertussis (BOOSTRIX) injection 0.5 mL (0.5 mL Intramuscular Given 11/18/23 1646)   morphine injection 4 mg (4 mg Intravenous Given 11/18/23 1646)       Imaging results:  CT Cervical Spine Without Contrast    Result Date: 11/18/2023  Impression: 1.  Small focus of asymmetric ill-defined hypodensity within the left external capsule most suggestive of infarct of indeterminate age. Further evaluation with MRI of the brain is recommended to better characterize. 2.  No findings of acute cervical spine fracture. 3.  Other findings as above.   This report was finalized on 11/18/2023 4:29 PM by Dr. Daniel Sierra M.D on Workstation: [id number]      CT Head Without Contrast    Result Date: 11/18/2023  Impression: 1.  Small focus of asymmetric ill-defined hypodensity within the left external capsule most suggestive of infarct of indeterminate age. Further evaluation with MRI of the brain is recommended to better characterize. 2.  No findings of acute cervical spine fracture. 3.  Other findings as above.   This report was finalized on 11/18/2023 4:29 PM by Dr. Sanchez  EPIFANIO Sierra on Workstation: [id number]      XR Ankle 3+ View Left    Result Date: 2023   As described.    This report was finalized on 2023 3:57 PM by Dr. Vadim Spicer M.D on Workstation: [id number]      XR Hip With or Without Pelvis 2 - 3 View Right    Result Date: 2023   As described.    This report was finalized on 2023 3:45 PM by Dr. Vadim Spicer M.D on Workstation: [id number]       Ambulatory status:   - assist    Social issues:   Social History     Socioeconomic History    Marital status:    Tobacco Use    Smoking status: Former     Packs/day: .5     Types: Cigarettes     Quit date:      Years since quittin.8    Smokeless tobacco: Never   Vaping Use    Vaping Use: Never used   Substance and Sexual Activity    Alcohol use: Yes     Alcohol/week: 3.0 standard drinks of alcohol     Types: 1 Glasses of wine, 1 Cans of beer, 1 Shots of liquor per week     Comment: 8 PER WEEK    Drug use: No    Sexual activity: Ryan Ahumada RN  23 18:12 EST

## 2023-11-18 NOTE — H&P
HISTORY AND PHYSICAL   [facility]        Date of Admission: 2023  Patient Identification:  Name: Rasheeda Balderas  Age: 73 y.o.  Sex: female  :  1950  MRN: [id number]                     Primary Care Physician: Malini Simeon MD    Chief Complaint:  73 year old female presented after she fell; she was going for a walk with her  and lost her balance; she has pain of her left ankle and right hip; she denies loss of consciousness but does have a scalp laceration;     History of Present Illness:   As above    Past Medical History:  Past Medical History:   Diagnosis Date    Arthritis     Atrial fibrillation     Bone spur     CAD (coronary artery disease)     Heartburn     Mitral valve prolapse syndrome     Myocardial infarction     pt is not aware of history of MI    Osteoporosis     PONV (postoperative nausea and vomiting)     Right hip pain      Past Surgical History:  Past Surgical History:   Procedure Laterality Date    CARDIAC CATHETERIZATION N/A 3/7/2019    Procedure: Right and Left Heart Cath;  Surgeon: Cam Hand MD;  Location: [facility] CATH INVASIVE LOCATION;  Service: Cardiology    CARDIAC CATHETERIZATION N/A 3/7/2019    Procedure: Coronary angiography;  Surgeon: Cam aHnd MD;  Location: [facility] CATH INVASIVE LOCATION;  Service: Cardiology    CARDIAC CATHETERIZATION N/A 3/7/2019    Procedure: Left ventriculography;  Surgeon: Cam Hand MD;  Location: [facility] CATH INVASIVE LOCATION;  Service: Cardiology    COLONOSCOPY N/A 2021    Procedure: COLONOSCOPY TO CECUM;  Surgeon: Mary Tsai MD;  Location: [facility] ENDOSCOPY;  Service: Gastroenterology;  Laterality: N/A;  PRE: SCREENING, FAMILY HX COLON POLYPS  POST: DIVERTICULOSIS, HEMORRHOIDS    LAPAROSCOPIC CHOLECYSTECTOMY W/ CHOLANGIOGRAPHY      MITRAL VALVE REPLACEMENT      SHOULDER SURGERY Left     TOTAL HIP ARTHROPLASTY Right 5/10/2021    Procedure: TOTAL HIP ARTHROPLASTY ANTERIOR WITH HANA TABLE;   Surgeon: Jasmeet Graham MD;  Location: [facility] OR;  Service: Orthopedics;  Laterality: Right;      Home Meds:  (Not in a hospital admission)      Allergies:  Allergies   Allergen Reactions    [place] Swelling     Immunizations:  Immunization History   Administered Date(s) Administered    COVID-19 (PFIZER) BIVALENT 12+YRS 2022    COVID-19 (PFIZER) Purple Cap Monovalent 2021, 2021, 2021    Tdap 2023     Social History:   Social History     Social History Narrative    Not on file     Social History     Socioeconomic History    Marital status:    Tobacco Use    Smoking status: Former     Packs/day: .5     Types: Cigarettes     Quit date:      Years since quittin.8    Smokeless tobacco: Never   Vaping Use    Vaping Use: Never used   Substance and Sexual Activity    Alcohol use: Yes     Alcohol/week: 3.0 standard drinks of alcohol     Types: 1 Glasses of wine, 1 Cans of beer, 1 Shots of liquor per week     Comment: 8 PER WEEK    Drug use: No    Sexual activity: Defer       Family History:  Family History   Problem Relation Age of Onset    Brain cancer Mother     Heart disease Father     Brain cancer Father     Stomach cancer Maternal Grandmother     Cancer Maternal Grandfather     No Known Problems Paternal Grandmother     No Known Problems Paternal Grandfather     Malig Hyperthermia Neg Hx         Review of Systems  See history of present illness and past medical history.  Patient denies headache, dizziness, syncope,  change in vision, change in hearing, change in taste, changes in weight, changes in appetite, focal weakness, numbness, or paresthesia.  Patient denies chest pain, palpitations, dyspnea, orthopnea, PND, cough, sinus pressure, rhinorrhea, epistaxis, hemoptysis, nausea, vomiting,hematemesis, diarrhea, constipation or hematochezia.  Denies cold or heat intolerance, polydipsia, polyuria, polyphagia. Denies hematuria, pyuria, dysuria, hesitancy, frequency or  "urgency. Denies consumption of raw and under cooked meats foods or change in water source.  Denies fever, chills, sweats, night sweats.     Objective:  T Max 24 hrs: Temp (24hrs), Av.1 °F (35.6 °C), Min:96.1 °F (35.6 °C), Max:96.1 °F (35.6 °C)    Vitals Ranges:   Temp:  [96.1 °F (35.6 °C)] 96.1 °F (35.6 °C)  Heart Rate:  [61-77] 66  Resp:  [18] 18  BP: (104-119)/(61-70) 104/70      Exam:  /70   Pulse 66   Temp 96.1 °F (35.6 °C) (Tympanic)   Resp 18   Ht 165.1 cm (65\")   Wt 59 kg (130 lb)   LMP  (LMP Unknown)   SpO2 98%   BMI 21.63 kg/m²     General Appearance:    Alert, cooperative, no distress, appears stated age   Head:    Normocephalic, without obvious abnormality, atraumatic   Eyes:    PERRL, conjunctivae/corneas clear, EOM's intact, both eyes   Ears:    Normal external ear canals, both ears   Nose:   Nares normal, septum midline, mucosa normal, no drainage    or sinus tenderness   Throat:   Lips, mucosa, and tongue normal   Neck:   Supple, symmetrical, trachea midline, no adenopathy;     thyroid:  no enlargement/tenderness/nodules; no carotid    bruit or JVD   Back:     Symmetric, no curvature, ROM normal, no CVA tenderness   Lungs:     Decreased breath sounds bilaterally, respirations unlabored   Chest Wall:    No tenderness or deformity    Heart:    Regular rate and rhythm, S1 and S2 normal, no murmur, rub   or gallop   Abdomen:     Soft, nontender, bowel sounds active all four quadrants,     no masses, no hepatomegaly, no splenomegaly   Extremities:   Extremities normal, atraumatic, no cyanosis or edema                       .    Data Review:  Labs in chart were reviewed.  WBC   Date Value Ref Range Status   2023 8.17 3.40 - 10.80 10*3/mm3 Final     Hemoglobin   Date Value Ref Range Status   2023 13.0 12.0 - 15.9 g/dL Final     Hematocrit   Date Value Ref Range Status   2023 38.8 34.0 - 46.6 % Final     Platelets   Date Value Ref Range Status   2023 163 140 - 450 " 10*3/mm3 Final     Sodium   Date Value Ref Range Status   11/18/2023 142 136 - 145 mmol/L Final     Potassium   Date Value Ref Range Status   11/18/2023 3.9 3.5 - 5.2 mmol/L Final     Chloride   Date Value Ref Range Status   11/18/2023 108 (H) 98 - 107 mmol/L Final     CO2   Date Value Ref Range Status   11/18/2023 25.0 22.0 - 29.0 mmol/L Final     BUN   Date Value Ref Range Status   11/18/2023 15 8 - 23 mg/dL Final     Creatinine   Date Value Ref Range Status   11/18/2023 0.66 0.57 - 1.00 mg/dL Final     Glucose   Date Value Ref Range Status   11/18/2023 100 (H) 65 - 99 mg/dL Final     Calcium   Date Value Ref Range Status   11/18/2023 9.7 8.6 - 10.5 mg/dL Final                Imaging Results (All)       Procedure Component Value Units Date/Time    CT Cervical Spine Without Contrast [[id number]] Collected: 11/18/23 1619     Updated: 11/18/23 1632    Narrative:      CT HEAD AND CERVICAL SPINE     HISTORY: Fall, hit head, right eye contusion     COMPARISON: None     TECHNIQUE: CT was performed of the head. CT was performed of the  cervical spine with axial, coronal, and sagittal reformatted images. No  intravenous contrast was administered. Radiation dose reduction  techniques were utilized, including automated exposure control and  exposure modulation based on body size.     FINDINGS:     CT head:  Small focus of asymmetric ill-defined hypodensity within the left  external capsule. No midline shift or hydrocephalus.. Asymmetric soft  tissue swelling and skin thickening overlies the right periorbital soft  tissues.     CT cervical spine:  No fracture of the cervical spine is seen. No prevertebral soft tissue  swelling. Somewhat nodular density overlying the right lung apex  demonstrates fat density, likely benign. Mild anterolisthesis of C3-4 on  C5 and minimal anterolisthesis of C3 on C4 is present. Multilevel at  least moderate degenerative changes are present and can be further  evaluated with MRI if clinically  indicated.       Impression:      Impression:  1.  Small focus of asymmetric ill-defined hypodensity within the left  external capsule most suggestive of infarct of indeterminate age.  Further evaluation with MRI of the brain is recommended to better  characterize.  2.  No findings of acute cervical spine fracture.  3.  Other findings as above.        This report was finalized on 11/18/2023 4:29 PM by Dr. Daniel Sierra M.D on Workstation: [id number]       CT Head Without Contrast [[id number]] Collected: 11/18/23 1619     Updated: 11/18/23 1632    Narrative:      CT HEAD AND CERVICAL SPINE     HISTORY: Fall, hit head, right eye contusion     COMPARISON: None     TECHNIQUE: CT was performed of the head. CT was performed of the  cervical spine with axial, coronal, and sagittal reformatted images. No  intravenous contrast was administered. Radiation dose reduction  techniques were utilized, including automated exposure control and  exposure modulation based on body size.     FINDINGS:     CT head:  Small focus of asymmetric ill-defined hypodensity within the left  external capsule. No midline shift or hydrocephalus.. Asymmetric soft  tissue swelling and skin thickening overlies the right periorbital soft  tissues.     CT cervical spine:  No fracture of the cervical spine is seen. No prevertebral soft tissue  swelling. Somewhat nodular density overlying the right lung apex  demonstrates fat density, likely benign. Mild anterolisthesis of C3-4 on  C5 and minimal anterolisthesis of C3 on C4 is present. Multilevel at  least moderate degenerative changes are present and can be further  evaluated with MRI if clinically indicated.       Impression:      Impression:  1.  Small focus of asymmetric ill-defined hypodensity within the left  external capsule most suggestive of infarct of indeterminate age.  Further evaluation with MRI of the brain is recommended to better  characterize.  2.  No findings of acute cervical spine  fracture.  3.  Other findings as above.        This report was finalized on 11/18/2023 4:29 PM by Dr. Daniel Sierra M.D on Workstation: [id number]       XR Ankle 3+ View Left [[id number]] Collected: 11/18/23 1546     Updated: 11/18/23 1600    Narrative:      XR ANKLE 3+ VW LEFT-     INDICATIONS: 3 views of the left ankle.     TECHNIQUE: NONE AVAILABLE     COMPARISON: None available     FINDINGS:  Soft tissue swelling is seen laterally at the ankle. Lucency at the  fibular tip is suspicious for nondisplaced fibular tip fracture. No  other evidence of fracture is identified. No dislocation. Ankle mortise  appears preserved. Minimal calcaneal spurring is seen.          Impression:         As described.           This report was finalized on 11/18/2023 3:57 PM by Dr. Vadim Spicer M.D on Workstation: [id number]       XR Hip With or Without Pelvis 2 - 3 View Right [[id number]] Collected: 11/18/23 1543     Updated: 11/18/23 1548    Narrative:      XR HIP W OR WO PELVIS 2-3 VIEW RIGHT-     INDICATIONS: Trauma.     TECHNIQUE: FRONTAL VIEW OF THE PELVIS, 3 VIEWS OF THE RIGHT HIP     COMPARISON: 5/10/2021     FINDINGS:     A fracture of the right suprapubic ramus extends into the right pubis.  No other recent fractures are noted. Right hip arthroplasty hardware  appears intact. Degenerative changes seen at the partly included lumbar  spine. No dislocation. Left hip joint space appears preserved.       Impression:         As described.           This report was finalized on 11/18/2023 3:45 PM by Dr. Vadim Spicer M.D on Workstation: [id number]                 Assessment:  Active Hospital Problems    Diagnosis  POA    **Ankle fracture [S82.899A]  Yes      Resolved Hospital Problems   No resolved problems to display.   Right pelvic fracture  Fall  Cad  A fib  Scalp laceration     Plan:  Ortho to see  Pt.ot to see  Pain control  Laceration was repaired in the ED  Dw patient and ed provider    Jody Cunningham,  MD  11/18/2023  18:29 EST

## 2023-11-18 NOTE — ED PROVIDER NOTES
EMERGENCY DEPARTMENT ENCOUNTER    Room Number:  36/36  PCP: Malini Simeon MD  Historian: Patient, family member at bedside      HPI:  Chief Complaint: Fall, right hip and left ankle pain  Context: Rasheeda Balderas is a 73 y.o. female who presents to the ED c/o fall, right hip and left ankle pain.  Patient was walking in the neighborhood with her  when she stumbled and fell.  Patient states her left ankle rolled up under her and she fell landing on her right side.  Patient did strike her head and sustained a superficial laceration over the right eyebrow.  Patient is complaining of pain in the left ankle and right hip.  Patient did have an episode of lightheadedness following injury prior to arrival which has resolved.  Patient denies chest pain.            PAST MEDICAL HISTORY  Active Ambulatory Problems     Diagnosis Date Noted    Mitral valve insufficiency 06/13/2016    Osteoporosis 01/04/2017    Mitral valve prolapse 12/29/2017    Mitral annular calcification 12/29/2017    Shortness of breath 02/08/2019    Non-rheumatic mitral regurgitation 02/27/2019    Long term (current) use of anticoagulants [Z79.01] 06/07/2019    S/P MVR (mitral valve repair) 06/10/2019    Paroxysmal atrial fibrillation 06/10/2019    NICM (nonischemic cardiomyopathy) 08/24/2020    Iron deficiency anemia 10/21/2020    Encounter for screening for malignant neoplasm of colon 02/02/2021    Right hip pain 03/23/2021    Primary osteoarthritis of right hip 03/24/2021    Osteopenia 04/14/2023     Resolved Ambulatory Problems     Diagnosis Date Noted    No Resolved Ambulatory Problems     Past Medical History:   Diagnosis Date    Arthritis     Atrial fibrillation     Bone spur     CAD (coronary artery disease)     Heartburn     Mitral valve prolapse syndrome     Myocardial infarction     PONV (postoperative nausea and vomiting)          PAST SURGICAL HISTORY  Past Surgical History:   Procedure Laterality Date    CARDIAC CATHETERIZATION N/A  3/7/2019    Procedure: Right and Left Heart Cath;  Surgeon: Cam Hand MD;  Location: [facility] CATH INVASIVE LOCATION;  Service: Cardiology    CARDIAC CATHETERIZATION N/A 3/7/2019    Procedure: Coronary angiography;  Surgeon: Cam Hand MD;  Location: [facility] CATH INVASIVE LOCATION;  Service: Cardiology    CARDIAC CATHETERIZATION N/A 3/7/2019    Procedure: Left ventriculography;  Surgeon: Cam Hand MD;  Location: [facility] CATH INVASIVE LOCATION;  Service: Cardiology    COLONOSCOPY N/A 2021    Procedure: COLONOSCOPY TO CECUM;  Surgeon: Mary Tsai MD;  Location: [facility] ENDOSCOPY;  Service: Gastroenterology;  Laterality: N/A;  PRE: SCREENING, FAMILY HX COLON POLYPS  POST: DIVERTICULOSIS, HEMORRHOIDS    LAPAROSCOPIC CHOLECYSTECTOMY W/ CHOLANGIOGRAPHY      MITRAL VALVE REPLACEMENT      SHOULDER SURGERY Left     TOTAL HIP ARTHROPLASTY Right 5/10/2021    Procedure: TOTAL HIP ARTHROPLASTY ANTERIOR WITH HANA TABLE;  Surgeon: Jasmeet Graham MD;  Location: [facility] MAIN OR;  Service: Orthopedics;  Laterality: Right;         FAMILY HISTORY  Family History   Problem Relation Age of Onset    Brain cancer Mother     Heart disease Father     Brain cancer Father     Stomach cancer Maternal Grandmother     Cancer Maternal Grandfather     No Known Problems Paternal Grandmother     No Known Problems Paternal Grandfather     Malig Hyperthermia Neg Hx          SOCIAL HISTORY  Social History     Socioeconomic History    Marital status:    Tobacco Use    Smoking status: Former     Packs/day: .5     Types: Cigarettes     Quit date:      Years since quittin.8    Smokeless tobacco: Never   Vaping Use    Vaping Use: Never used   Substance and Sexual Activity    Alcohol use: Yes     Alcohol/week: 3.0 standard drinks of alcohol     Types: 1 Glasses of wine, 1 Cans of beer, 1 Shots of liquor per week     Comment: 8 PER WEEK    Drug use: No    Sexual activity: Defer          ALLERGIES  [place]        REVIEW OF SYSTEMS  Review of Systems   Neurological:  Positive for light-headedness.   All other systems reviewed and are negative.           PHYSICAL EXAM  ED Triage Vitals [11/18/23 1428]   Temp Heart Rate Resp BP SpO2   96.1 °F (35.6 °C) 75 18 113/61 98 %      Temp src Heart Rate Source Patient Position BP Location FiO2 (%)   Tympanic -- Sitting Left arm --       Physical Exam      GENERAL: no acute distress  HENT: nares patent  EYES: no scleral icterus  CV: regular rhythm, normal rate  RESPIRATORY: normal effort  ABDOMEN: soft  MUSCULOSKELETAL: no deformity.  Tenderness to palpation of the lateral malleolus on the left ankle.  Tenderness to palpation in the anterior right pelvis/hip  NEURO: alert, moves all extremities, follows commands  PSYCH:  calm, cooperative  SKIN: warm, dry.  0.5 cm laceration over the right eyebrow    Vital signs and nursing notes reviewed.          LAB RESULTS  Recent Results (from the past 24 hour(s))   ECG 12 Lead Syncope    Collection Time: 11/18/23  4:43 PM   Result Value Ref Range    QT Interval 382 ms    QTC Interval 369 ms   Comprehensive Metabolic Panel    Collection Time: 11/18/23  4:57 PM    Specimen: Blood   Result Value Ref Range    Glucose 100 (H) 65 - 99 mg/dL    BUN 15 8 - 23 mg/dL    Creatinine 0.66 0.57 - 1.00 mg/dL    Sodium 142 136 - 145 mmol/L    Potassium 3.9 3.5 - 5.2 mmol/L    Chloride 108 (H) 98 - 107 mmol/L    CO2 25.0 22.0 - 29.0 mmol/L    Calcium 9.7 8.6 - 10.5 mg/dL    Total Protein 6.3 6.0 - 8.5 g/dL    Albumin 4.3 3.5 - 5.2 g/dL    ALT (SGPT) 19 1 - 33 U/L    AST (SGOT) 18 1 - 32 U/L    Alkaline Phosphatase 62 39 - 117 U/L    Total Bilirubin 0.5 0.0 - 1.2 mg/dL    Globulin 2.0 gm/dL    A/G Ratio 2.2 g/dL    BUN/Creatinine Ratio 22.7 7.0 - 25.0    Anion Gap 9.0 5.0 - 15.0 mmol/L    eGFR 93.3 >60.0 mL/min/1.73   Single High Sensitivity Troponin T    Collection Time: 11/18/23  4:57 PM    Specimen: Blood   Result Value Ref  Range    HS Troponin T 19 (H) <14 ng/L   CBC Auto Differential    Collection Time: 11/18/23  4:57 PM    Specimen: Blood   Result Value Ref Range    WBC 8.17 3.40 - 10.80 10*3/mm3    RBC 4.07 3.77 - 5.28 10*6/mm3    Hemoglobin 13.0 12.0 - 15.9 g/dL    Hematocrit 38.8 34.0 - 46.6 %    MCV 95.3 79.0 - 97.0 fL    MCH 31.9 26.6 - 33.0 pg    MCHC 33.5 31.5 - 35.7 g/dL    RDW 12.7 12.3 - 15.4 %    RDW-SD 44.3 37.0 - 54.0 fl    MPV 10.4 6.0 - 12.0 fL    Platelets 163 140 - 450 10*3/mm3    Neutrophil % 75.4 42.7 - 76.0 %    Lymphocyte % 17.9 (L) 19.6 - 45.3 %    Monocyte % 5.0 5.0 - 12.0 %    Eosinophil % 0.9 0.3 - 6.2 %    Basophil % 0.2 0.0 - 1.5 %    Immature Grans % 0.6 (H) 0.0 - 0.5 %    Neutrophils, Absolute 6.16 1.70 - 7.00 10*3/mm3    Lymphocytes, Absolute 1.46 0.70 - 3.10 10*3/mm3    Monocytes, Absolute 0.41 0.10 - 0.90 10*3/mm3    Eosinophils, Absolute 0.07 0.00 - 0.40 10*3/mm3    Basophils, Absolute 0.02 0.00 - 0.20 10*3/mm3    Immature Grans, Absolute 0.05 0.00 - 0.05 10*3/mm3    nRBC 0.0 0.0 - 0.2 /100 WBC       Ordered the above labs and reviewed the results.        RADIOLOGY  CT Cervical Spine Without Contrast, CT Head Without Contrast    Result Date: 11/18/2023  CT HEAD AND CERVICAL SPINE  HISTORY: Fall, hit head, right eye contusion  COMPARISON: None  TECHNIQUE: CT was performed of the head. CT was performed of the cervical spine with axial, coronal, and sagittal reformatted images. No intravenous contrast was administered. Radiation dose reduction techniques were utilized, including automated exposure control and exposure modulation based on body size.  FINDINGS:  CT head: Small focus of asymmetric ill-defined hypodensity within the left external capsule. No midline shift or hydrocephalus.. Asymmetric soft tissue swelling and skin thickening overlies the right periorbital soft tissues.  CT cervical spine: No fracture of the cervical spine is seen. No prevertebral soft tissue swelling. Somewhat nodular  density overlying the right lung apex demonstrates fat density, likely benign. Mild anterolisthesis of C3-4 on C5 and minimal anterolisthesis of C3 on C4 is present. Multilevel at least moderate degenerative changes are present and can be further evaluated with MRI if clinically indicated.      Impression: 1.  Small focus of asymmetric ill-defined hypodensity within the left external capsule most suggestive of infarct of indeterminate age. Further evaluation with MRI of the brain is recommended to better characterize. 2.  No findings of acute cervical spine fracture. 3.  Other findings as above.   This report was finalized on 11/18/2023 4:29 PM by Dr. Daniel Sierra M.D on Workstation: [email or web address]      XR Ankle 3+ View Left    Result Date: 11/18/2023  XR ANKLE 3+ VW LEFT-  INDICATIONS: 3 views of the left ankle.  TECHNIQUE: NONE AVAILABLE  COMPARISON: None available  FINDINGS: Soft tissue swelling is seen laterally at the ankle. Lucency at the fibular tip is suspicious for nondisplaced fibular tip fracture. No other evidence of fracture is identified. No dislocation. Ankle mortise appears preserved. Minimal calcaneal spurring is seen.        As described.    This report was finalized on 11/18/2023 3:57 PM by Dr. Vadim Spicer M.D on Workstation: [email or web address]      XR Hip With or Without Pelvis 2 - 3 View Right    Result Date: 11/18/2023  XR HIP W OR WO PELVIS 2-3 VIEW RIGHT-  INDICATIONS: Trauma.  TECHNIQUE: FRONTAL VIEW OF THE PELVIS, 3 VIEWS OF THE RIGHT HIP  COMPARISON: 5/10/2021  FINDINGS:  A fracture of the right suprapubic ramus extends into the right pubis. No other recent fractures are noted. Right hip arthroplasty hardware appears intact. Degenerative changes seen at the partly included lumbar spine. No dislocation. Left hip joint space appears preserved.       As described.    This report was finalized on 11/18/2023 3:45 PM by Dr. Vadim Spicer M.D on Workstation: [employer]       Ordered the above noted  radiological studies. Reviewed by me in PACS.            MEDICATIONS GIVEN IN ER  Medications   Tetanus-Diphth-Acell Pertussis (BOOSTRIX) injection 0.5 mL (0.5 mL Intramuscular Given 11/18/23 1646)   morphine injection 4 mg (4 mg Intravenous Given 11/18/23 1646)       Laceration Repair    Date/Time: 11/18/2023 5:03 PM    Performed by: Martín Davila MD  Authorized by: Martín Davila MD    Consent:     Consent obtained:  Verbal    Consent given by:  Patient    Risks discussed:  Infection, poor wound healing and poor cosmetic result    Alternatives discussed:  No treatment  Universal protocol:     Patient identity confirmed:  Arm band and verbally with patient  Anesthesia:     Anesthesia method:  None  Laceration details:     Location:  Face    Face location:  Forehead    Length (cm):  0.5  Exploration:     Hemostasis achieved with:  Direct pressure  Treatment:     Area cleansed with:  Saline    Amount of cleaning:  Standard    Debridement:  None  Skin repair:     Repair method:  Tissue adhesive  Repair type:     Repair type:  Simple  Post-procedure details:     Procedure completion:  Tolerated well, no immediate complications               MEDICAL DECISION MAKING, PROGRESS, and CONSULTS    All labs have been independently reviewed by me.  All radiology studies have been reviewed by me and I have also reviewed the radiology report.   EKG's independently viewed and interpreted by me.  Discussion below represents my analysis of pertinent findings related to patient's condition, differential diagnosis, treatment plan and final disposition.      Additional sources:  - Discussed/ obtained information from independent historians:  at bedside    - External (non-ED) record review: Office visit with cardiology from 1/3/2023 reviewed and notable for history of paroxysmal A-fib, severe mitral insufficiency and history of ring annuloplasty and Drew stitch in the right mitral valve.  Plan at that visit was to  continue with Aldactone and follow-up in 1 year for repeat echo    - Chronic or social conditions impacting care: History of mitral valve insufficiency, A-fib    - Shared decision making: Utilizing shared decision-making techniques we elected to proceed with inpatient management      Orders placed during this visit:  Orders Placed This Encounter   Procedures    Laceration Repair    [facility] 08.  CAM Boot    XR Hip With or Without Pelvis 2 - 3 View Right    XR Ankle 3+ View Left    CT Cervical Spine Without Contrast    CT Head Without Contrast    Comprehensive Metabolic Panel    Single High Sensitivity Troponin T    CBC Auto Differential    ECG 12 Lead Syncope    Initiate Observation Status    CBC & Differential         Differential diagnosis includes but is not limited to:    Hip fracture, ankle fracture, laceration      Independent interpretation of labs, radiology studies, and discussions with consultants:  ED Course as of 11/18/23 1801   Sat Nov 18, 2023   1504 First look: Patient tripped and fell around 1:30 PM.  She struck her forehead on the ground.  Denies loss of consciousness but she did feel somewhat dazed.  She currently complains of a headache, left ankle pain, and right groin pain. She has been able to ambulate since falling.  She takes Eliquis.  Patient has had a right hip replacement.    Brief exam: Awake, alert, oriented x3.  There is a right brow laceration.  C/T/L-spine are nontender.  There is tenderness and swelling of the left lateral ankle.  There is tenderness over the right anterior hip.  There are equal pedal pulses bilaterally.  GCS 15.    Plan is to obtain imaging studies.  Patient's care will be assumed by the oncoming physician. [WH]   1701 Ankle x-ray interpreted by me and demonstrates findings concerning for distal left fibula fracture [MW]   1801 Discussed with Dr. Cunningham who agrees to admit [MW]      ED Course User Index  [MW] Martín Davila MD  [WH] Cam Barron  MD ARI           DIAGNOSIS  Final diagnoses:   Closed fracture of left ankle, initial encounter   Closed fracture of pubic ramus, right, initial encounter   Eyebrow laceration, right, initial encounter         DISPOSITION  ED Disposition       ED Disposition   Decision to Admit    Condition   --    Comment   Level of Care: Telemetry [5]   Diagnosis: Ankle fracture [389587]   Admitting Physician: BALBINA THAO [[id number]]   Attending Physician: BALBINA THAO [[id number]]                          Latest Documented Vital Signs:  As of 18:01 EST  BP- 119/62 HR- 61 Temp- 96.1 °F (35.6 °C) (Tympanic) O2 sat- 98%              --    Please note that portions of this were completed with a voice recognition program.       Note Disclaimer: At [facility], we believe that sharing information builds trust and better relationships. You are receiving this note because you are receiving care at [facility] or recently visited. It is possible you will see health information before a provider has talked with you about it. This kind of information can be easy to misunderstand. To help you fully understand what it means for your health, we urge you to discuss this note with your provider.             Martín Davila MD  11/18/23 4415

## 2023-11-19 ENCOUNTER — APPOINTMENT (OUTPATIENT)
Dept: MRI IMAGING | Facility: HOSPITAL | Age: 73
End: 2023-11-19
Payer: MEDICARE

## 2023-11-19 PROBLEM — S32.9XXA PELVIC FRACTURE: Status: ACTIVE | Noted: 2023-11-19

## 2023-11-19 PROBLEM — R90.89 ABNORMAL CT OF BRAIN: Status: ACTIVE | Noted: 2023-11-19

## 2023-11-19 LAB
25(OH)D3 SERPL-MCNC: 22.3 NG/ML (ref 30–100)
ANION GAP SERPL CALCULATED.3IONS-SCNC: 8 MMOL/L (ref 5–15)
BUN SERPL-MCNC: 16 MG/DL (ref 8–23)
BUN/CREAT SERPL: 21.9 (ref 7–25)
CALCIUM SPEC-SCNC: 8.6 MG/DL (ref 8.6–10.5)
CHLORIDE SERPL-SCNC: 109 MMOL/L (ref 98–107)
CO2 SERPL-SCNC: 24 MMOL/L (ref 22–29)
CREAT SERPL-MCNC: 0.73 MG/DL (ref 0.57–1)
DEPRECATED RDW RBC AUTO: 41.2 FL (ref 37–54)
EGFRCR SERPLBLD CKD-EPI 2021: 87.5 ML/MIN/1.73
ERYTHROCYTE [DISTWIDTH] IN BLOOD BY AUTOMATED COUNT: 12.2 % (ref 12.3–15.4)
GLUCOSE SERPL-MCNC: 100 MG/DL (ref 65–99)
HCT VFR BLD AUTO: 32.1 % (ref 34–46.6)
HGB BLD-MCNC: 11.1 G/DL (ref 12–15.9)
MCH RBC QN AUTO: 32 PG (ref 26.6–33)
MCHC RBC AUTO-ENTMCNC: 34.6 G/DL (ref 31.5–35.7)
MCV RBC AUTO: 92.5 FL (ref 79–97)
PLATELET # BLD AUTO: 142 10*3/MM3 (ref 140–450)
PMV BLD AUTO: 10.3 FL (ref 6–12)
POTASSIUM SERPL-SCNC: 3.6 MMOL/L (ref 3.5–5.2)
QT INTERVAL: 382 MS
QTC INTERVAL: 369 MS
RBC # BLD AUTO: 3.47 10*6/MM3 (ref 3.77–5.28)
SODIUM SERPL-SCNC: 141 MMOL/L (ref 136–145)
WBC NRBC COR # BLD AUTO: 6.45 10*3/MM3 (ref 3.4–10.8)

## 2023-11-19 PROCEDURE — 80048 BASIC METABOLIC PNL TOTAL CA: CPT | Performed by: INTERNAL MEDICINE

## 2023-11-19 PROCEDURE — 70551 MRI BRAIN STEM W/O DYE: CPT

## 2023-11-19 PROCEDURE — 97161 PT EVAL LOW COMPLEX 20 MIN: CPT

## 2023-11-19 PROCEDURE — G0378 HOSPITAL OBSERVATION PER HR: HCPCS

## 2023-11-19 PROCEDURE — 97116 GAIT TRAINING THERAPY: CPT

## 2023-11-19 PROCEDURE — 25810000003 SODIUM CHLORIDE 0.9 % SOLUTION: Performed by: INTERNAL MEDICINE

## 2023-11-19 PROCEDURE — 36415 COLL VENOUS BLD VENIPUNCTURE: CPT | Performed by: INTERNAL MEDICINE

## 2023-11-19 PROCEDURE — 85027 COMPLETE CBC AUTOMATED: CPT | Performed by: INTERNAL MEDICINE

## 2023-11-19 PROCEDURE — 82306 VITAMIN D 25 HYDROXY: CPT | Performed by: ORTHOPAEDIC SURGERY

## 2023-11-19 RX ORDER — DIAZEPAM 5 MG/1
5 TABLET ORAL ONCE AS NEEDED
Status: COMPLETED | OUTPATIENT
Start: 2023-11-19 | End: 2023-11-19

## 2023-11-19 RX ORDER — ERGOCALCIFEROL 1.25 MG/1
50000 CAPSULE ORAL
Qty: 3 CAPSULE | Refills: 0 | Status: SHIPPED | OUTPATIENT
Start: 2023-11-19

## 2023-11-19 RX ORDER — ERGOCALCIFEROL 1.25 MG/1
50000 CAPSULE ORAL
Status: DISCONTINUED | OUTPATIENT
Start: 2023-11-19 | End: 2023-11-20 | Stop reason: HOSPADM

## 2023-11-19 RX ADMIN — DIAZEPAM 5 MG: 5 TABLET ORAL at 22:31

## 2023-11-19 RX ADMIN — HYDROCODONE BITARTRATE AND ACETAMINOPHEN 1 TABLET: 5; 325 TABLET ORAL at 04:54

## 2023-11-19 RX ADMIN — APIXABAN 5 MG: 5 TABLET, FILM COATED ORAL at 09:52

## 2023-11-19 RX ADMIN — METOPROLOL SUCCINATE 12.5 MG: 25 TABLET, EXTENDED RELEASE ORAL at 09:52

## 2023-11-19 RX ADMIN — HYDROCODONE BITARTRATE AND ACETAMINOPHEN 1 TABLET: 5; 325 TABLET ORAL at 00:47

## 2023-11-19 RX ADMIN — HYDROCODONE BITARTRATE AND ACETAMINOPHEN 1 TABLET: 5; 325 TABLET ORAL at 21:13

## 2023-11-19 RX ADMIN — SODIUM CHLORIDE 75 ML/HR: 9 INJECTION, SOLUTION INTRAVENOUS at 07:49

## 2023-11-19 RX ADMIN — Medication 12.5 MG: at 16:43

## 2023-11-19 RX ADMIN — HYDROCODONE BITARTRATE AND ACETAMINOPHEN 1 TABLET: 5; 325 TABLET ORAL at 11:31

## 2023-11-19 RX ADMIN — HYDROCODONE BITARTRATE AND ACETAMINOPHEN 1 TABLET: 5; 325 TABLET ORAL at 16:42

## 2023-11-19 RX ADMIN — DOCUSATE SODIUM 50MG AND SENNOSIDES 8.6MG 2 TABLET: 8.6; 5 TABLET, FILM COATED ORAL at 20:55

## 2023-11-19 RX ADMIN — DIGOXIN 125 MCG: 125 TABLET ORAL at 09:52

## 2023-11-19 RX ADMIN — CETIRIZINE HYDROCHLORIDE 10 MG: 10 TABLET ORAL at 09:52

## 2023-11-19 RX ADMIN — APIXABAN 5 MG: 5 TABLET, FILM COATED ORAL at 20:55

## 2023-11-19 RX ADMIN — ERGOCALCIFEROL 50000 UNITS: 1.25 CAPSULE ORAL at 16:43

## 2023-11-19 NOTE — PLAN OF CARE
Goal Outcome Evaluation:              Outcome Evaluation: Up with SBA and walker and cam boot. PRN norco administered for c/o R groin pain and it has since been effective. Safety maintained.

## 2023-11-19 NOTE — THERAPY EVALUATION
Patient Name: Rasheeda Balderas  : 1950    MRN: [id number]                              Today's Date: 2023       Admit Date: 2023    Visit Dx:     ICD-10-CM ICD-9-CM   1. Closed fracture of left ankle, initial encounter  S82.892A 824.8   2. Closed fracture of pubic ramus, right, initial encounter  S32.591A 808.2   3. Eyebrow laceration, right, initial encounter  S01.111A 873.42     Patient Active Problem List   Diagnosis    Mitral valve insufficiency    Osteoporosis    Mitral valve prolapse    Mitral annular calcification    Shortness of breath    Non-rheumatic mitral regurgitation    Long term (current) use of anticoagulants [Z79.01]    S/P MVR (mitral valve repair)    Paroxysmal atrial fibrillation    NICM (nonischemic cardiomyopathy)    Iron deficiency anemia    Encounter for screening for malignant neoplasm of colon    Right hip pain    Primary osteoarthritis of right hip    Osteopenia    Ankle fracture     Past Medical History:   Diagnosis Date    Arthritis     Atrial fibrillation     Bone spur     CAD (coronary artery disease)     Heartburn     Mitral valve prolapse syndrome     Myocardial infarction     pt is not aware of history of MI    Osteoporosis     PONV (postoperative nausea and vomiting)     Right hip pain      Past Surgical History:   Procedure Laterality Date    CARDIAC CATHETERIZATION N/A 3/7/2019    Procedure: Right and Left Heart Cath;  Surgeon: Cam Hand MD;  Location: [facility] CATH INVASIVE LOCATION;  Service: Cardiology    CARDIAC CATHETERIZATION N/A 3/7/2019    Procedure: Coronary angiography;  Surgeon: Cam Hand MD;  Location: [facility] CATH INVASIVE LOCATION;  Service: Cardiology    CARDIAC CATHETERIZATION N/A 3/7/2019    Procedure: Left ventriculography;  Surgeon: Cam Hand MD;  Location: [facility] CATH INVASIVE LOCATION;  Service: Cardiology    COLONOSCOPY N/A 2021    Procedure: COLONOSCOPY TO CECUM;  Surgeon: Mary Tsai MD;  Location: [facility]  ENDOSCOPY;  Service: Gastroenterology;  Laterality: N/A;  PRE: SCREENING, FAMILY HX COLON POLYPS  POST: DIVERTICULOSIS, HEMORRHOIDS    LAPAROSCOPIC CHOLECYSTECTOMY W/ CHOLANGIOGRAPHY      MITRAL VALVE REPLACEMENT      SHOULDER SURGERY Left     TOTAL HIP ARTHROPLASTY Right 5/10/2021    Procedure: TOTAL HIP ARTHROPLASTY ANTERIOR WITH HANA TABLE;  Surgeon: Jasmeet Graham MD;  Location: [facility] MAIN OR;  Service: Orthopedics;  Laterality: Right;      General Information       Row Name 11/19/23 1330          Physical Therapy Time and Intention    Document Type evaluation  -     Mode of Treatment individual therapy;physical therapy  -       Row Name 11/19/23 1330          General Information    Patient Profile Reviewed yes  -     Prior Level of Function independent:;gait;transfer;bed mobility  -     Existing Precautions/Restrictions fall  -     Barriers to Rehab none identified  -       Row Name 11/19/23 1330          Living Environment    People in Home spouse  -       Row Name 11/19/23 1330          Cognition    Orientation Status (Cognition) oriented x 4  -       Row Name 11/19/23 1330          Safety Issues, Functional Mobility    Impairments Affecting Function (Mobility) balance;endurance/activity tolerance;pain  -               User Key  (r) = Recorded By, (t) = Taken By, (c) = Cosigned By      Initials Name Provider Type     Lucita Guillen PT Physical Therapist                   Mobility       Row Name 11/19/23 1330          Bed Mobility    Bed Mobility supine-sit  -     Supine-Sit [place] (Bed Mobility) standby assist;verbal cues  -     Assistive Device (Bed Mobility) bed rails;head of bed elevated  -       Row Name 11/19/23 1330          Sit-Stand Transfer    Sit-Stand [place] (Transfers) standby assist;verbal cues  -     Assistive Device (Sit-Stand Transfers) walker, front-wheeled  -       Row Name 11/19/23 1330          Gait/Stairs (Locomotion)    [place] Level  (Gait) standby assist;verbal cues  -     Assistive Device (Gait) walker, front-wheeled  -     Distance in Feet (Gait) 70ft  -     Deviations/Abnormal Patterns (Gait) antalgic;gait speed decreased;stride length decreased;weight shifting decreased  -     Bilateral Gait Deviations forward flexed posture  -     Comment, (Gait/Stairs) C/o R groin radiating into back pain with progressed gait distance  -[facility] Name 11/19/23 1330          Mobility    Extremity Weight-bearing Status left lower extremity;right lower extremity  -     Left Lower Extremity (Weight-bearing Status) weight-bearing as tolerated (WBAT)  WBAT with CAM boot  -     Right Lower Extremity (Weight-bearing Status) weight-bearing as tolerated (WBAT)  Protected WBing with a rwx  [facility]               User Key  (r) = Recorded By, (t) = Taken By, (c) = Cosigned By      Initials Name Provider Type     Lucita Guillen, PT Physical Therapist                   Obj/Interventions       [facility] Name 11/19/23 7203          Range of Motion Comprehensive    General Range of Motion bilateral lower extremity ROM WFL  -     Comment, General Range of Motion Other than R foot in CAM boot  -[facility] Name 11/19/23 1332          Strength Comprehensive (MMT)    General Manual Muscle Testing (MMT) Assessment lower extremity strength deficits identified  -     Comment, General Manual Muscle Testing (MMT) Assessment Generalized weakness  -[facility] Name 11/19/23 1332          Balance    Balance Assessment sitting static balance;sitting dynamic balance;standing static balance;standing dynamic balance  -     Static Sitting Balance supervision  -     Dynamic Sitting Balance supervision  -     Position, Sitting Balance unsupported;sitting edge of bed  -     Static Standing Balance standby assist  -     Dynamic Standing Balance standby assist  -     Position/Device Used, Standing Balance supported;walker, front-wheeled  -     Balance Interventions  sitting;standing;sit to stand;supported;static;dynamic  -       Row Name 11/19/23 6453          Sensory Assessment (Somatosensory)    Sensory Assessment (Somatosensory) LE sensation intact  -               User Key  (r) = Recorded By, (t) = Taken By, (c) = Cosigned By      Initials Name Provider Type     Lucita Guillen, PT Physical Therapist                   Goals/Plan       Row Name 11/19/23 9097          Bed Mobility Goal 1 (PT)    Activity/Assistive Device (Bed Mobility Goal 1, PT) bed mobility activities, all  -     [place] Level/Cues Needed (Bed Mobility Goal 1, PT) modified independence  -     Time Frame (Bed Mobility Goal 1, PT) 1 week  -[facility] Name 11/19/23 1343          Transfer Goal 1 (PT)    Activity/Assistive Device (Transfer Goal 1, PT) transfers, all  -     [place] Level/Cues Needed (Transfer Goal 1, PT) modified independence  -     Time Frame (Transfer Goal 1, PT) 1 week  -[facility] Name 11/19/23 1346          Gait Training Goal 1 (PT)    Activity/Assistive Device (Gait Training Goal 1, PT) gait (walking locomotion)  -     [place] Level (Gait Training Goal 1, PT) modified independence  -     Distance (Gait Training Goal 1, PT) 150ft  -     Time Frame (Gait Training Goal 1, PT) 1 week  -[facility] Name 11/19/23 5305          Therapy Assessment/Plan (PT)    Planned Therapy Interventions (PT) balance training;bed mobility training;gait training;home exercise program;patient/family education;strengthening;transfer training  -               User Key  (r) = Recorded By, (t) = Taken By, (c) = Cosigned By      Initials Name Provider Type     Lucita Guillen PT Physical Therapist                   Clinical Impression       Row Name 11/19/23 8688          Pain    Pretreatment Pain Rating 5/10  -     Posttreatment Pain Rating 7/10  -     Pain Location - Side/Orientation Right  -     Pain Location - groin;back  -     Pain Intervention(s)  Repositioned;Ambulation/increased activity;Rest  -       Row Name 11/19/23 9499          Plan of Care Review    Plan of Care Reviewed With patient  -     Outcome Evaluation Pt is a 72 yo F admitted from home after a fall. Work-up revealed pubic rami fx and L distal fibular avulsion fx. Per ortho, pt to be protected WBing RLE with a rwx and WBAT LLE in a CAM boot. Pt lives with her  and reports independence at BL with no AD - reports she and her  walk 40 minutes everyday. Pt presents to PT with impaired balance with boot and R groin pain limiting overall mobility. Pt transferred to EOB with SBA, difficulty moving RLE towards EOB but otherwise independent. Donned R shoe to assist with balance using CAM boot. Pt stood with SBA and ambulated 70ft with rwx and SBA. Pt unsteady but independent with rwx use. Pt c/o R groin pain radiating into her back with progressed gait distance prompting return to room. Pt agreeable to sitting [facility] to eat lunch, left with needs met. Encouraged pt to call out for assist up to bathroom and walking in hallway as tolerated. Anticipate DC home with OPPT to f/u and family assist as needed.  -       Row Name 11/19/23 2084          Therapy Assessment/Plan (PT)    Patient/Family Therapy Goals Statement (PT) Return to OF  -     Rehab Potential (PT) good, to achieve stated therapy goals  [facility]     Criteria for Skilled Interventions Met (PT) yes  -     Therapy Frequency (PT) 5 times/wk  -       Row Name 11/19/23 1333          Vital Signs    O2 Delivery Pre Treatment room air  -     O2 Delivery Intra Treatment room air  -     O2 Delivery Post Treatment room air  -       Row Name 11/19/23 1330          Positioning and Restraints    Pre-Treatment Position in bed  -     Post Treatment Position chair  -     In Chair notified nsg;reclined;call light within reach;encouraged to call for assist  -               User Key  (r) = Recorded By, (t) = Taken By, (c) = Cosigned  By      Initials Name Provider Type     Lucita Guillen, PT Physical Therapist                   Outcome Measures       Row Name 11/19/23 1344 11/19/23 0952       How much help from another person do you currently need...    Turning from your back to your side while in flat bed without using bedrails? 4  - 4  -SL    Moving from lying on back to sitting on the side of a flat bed without bedrails? 4  - 4  -SL    Moving to and from a bed to a chair (including a wheelchair)? 3  - 3  -SL    Standing up from a chair using your arms (e.g., wheelchair, bedside chair)? 3  - 3  -SL    Climbing 3-5 steps with a railing? 2  - 2  -SL    To walk in hospital room? 3  - 3  -SL    AM-PAC 6 Clicks Score (PT) 19  - 19  -SL    Highest Level of Mobility Goal 6 --> Walk 10 steps or more  - 6 --> Walk 10 steps or more  -SL      Row Name 11/19/23 1344          Functional Assessment    Outcome Measure Options AM-PAC 6 Clicks Basic Mobility (PT)  -               User Key  (r) = Recorded By, (t) = Taken By, (c) = Cosigned By      Initials Name Provider Type     Lucita Guillen, PT Physical Therapist    Aditi Givens, RN Registered Nurse                                 Physical Therapy Education       Title: PT OT SLP Therapies (In Progress)       Topic: Physical Therapy (In Progress)       Point: Mobility training (Done)       Learning Progress Summary             Patient Acceptance, E,TB,D, VU,NR by  at 11/19/2023 1344                         Point: Home exercise program (Not Started)       Learner Progress:  Not documented in this visit.              Point: Body mechanics (Done)       Learning Progress Summary             Patient Acceptance, E,TB,D, VU,NR by  at 11/19/2023 1344                         Point: Precautions (Done)       Learning Progress Summary             Patient Acceptance, E,TB,D, VU,NR by  at 11/19/2023 1344                                         User Key       Initials Effective Dates  Name Provider Type Discipline     04/08/22 -  Lucita Guillen, PT Physical Therapist PT                  PT Recommendation and Plan  Planned Therapy Interventions (PT): balance training, bed mobility training, gait training, home exercise program, patient/family education, strengthening, transfer training  Plan of Care Reviewed With: patient  Outcome Evaluation: Pt is a 74 yo F admitted from home after a fall. Work-up revealed pubic rami fx and L distal fibular avulsion fx. Per ortho, pt to be protected WBing RLE with a rwx and WBAT LLE in a CAM boot. Pt lives with her  and reports independence at BL with no AD - reports she and her  walk 40 minutes everyday. Pt presents to PT with impaired balance with boot and R groin pain limiting overall mobility. Pt transferred to EOB with SBA, difficulty moving RLE towards EOB but otherwise independent. Donned R shoe to assist with balance using CAM boot. Pt stood with SBA and ambulated 70ft with rwx and SBA. Pt unsteady but independent with rwx use. Pt c/o R groin pain radiating into her back with progressed gait distance prompting return to room. Pt agreeable to sitting [facility] to eat lunch, left with needs met. Encouraged pt to call out for assist up to bathroom and walking in hallway as tolerated. Anticipate DC home with OPPT to f/u and family assist as needed.     Time Calculation:   PT Evaluation Complexity  History, PT Evaluation Complexity: 1-2 personal factors and/or comorbidities  Examination of Body Systems (PT Eval Complexity): total of 3 or more elements  Clinical Presentation (PT Evaluation Complexity): stable  Clinical Decision Making (PT Evaluation Complexity): low complexity  Overall Complexity (PT Evaluation Complexity): low complexity     PT Charges       Row Name 11/19/23 1344             Time Calculation    Start Time 1204  -      Stop Time 1220  -      Time Calculation (min) 16 min  -      PT Received On 11/19/23  -      PT - Next  Appointment 11/20/23  -      PT Goal Re-Cert Due Date 11/26/23  -         Time Calculation- PT    Total Timed Code Minutes- PT 12 minute(s)  -         Timed Charges    81008 - Gait Training Minutes  8  -      49128 - PT Therapeutic Activity Minutes 4  -         Untimed Charges    PT Eval/Re-eval Minutes 4  -         Total Minutes    Timed Charges Total Minutes 12  -      Untimed Charges Total Minutes 4  -       Total Minutes 16  -                User Key  (r) = Recorded By, (t) = Taken By, (c) = Cosigned By      Initials Name Provider Type     Lucita Guillen, PT Physical Therapist                  Therapy Charges for Today       Code Description Service Date Service Provider Modifiers Qty    01434250352 HC GAIT TRAINING EA 15 MIN 11/19/2023 Lucita Guillen, PT GP 1    [id number] HC PT EVAL LOW COMPLEXITY 3 11/19/2023 Lucita Guillen, PT GP 1            PT G-Codes  Outcome Measure Options: AM-PAC 6 Clicks Basic Mobility (PT)  AM-PAC 6 Clicks Score (PT): 19  PT Discharge Summary  Anticipated Discharge Disposition (PT): home with assist, home with outpatient therapy services    Lucita Guillen, PT  11/19/2023

## 2023-11-19 NOTE — CONSULTS
Orthopedic Consult      Patient: Rasheeda Balderas    Date of Admission: 11/18/2023  2:31 PM    YOB: 1950    Medical Record Number: [id number]    Consulting Physician: Tomas Landeros MD    Chief Complaints: Right pelvic pain, left ankle pain    History of Present Illness: 73 y.o. female admitted to [facility] to services of Tomas Landeros MD with right pelvic pain, left ankle pain after sustaining a fall.  Patient did have a previous right total hip replacement by my  Dr. Graham over a year ago reports to be doing quite well.  She had a recent fall and was complaining of some pelvic pain and ankle pain and swelling.  X-rays showed pubic rami fracture and left distal fibula avulsion fracture.  Orthopedics was consulted for evaluation and management.  Patient was resting well when seen.  States her ankle is feeling better and sent in a short cam boot.  Does complain of some pubic/groin type pain.  Otherwise was doing well before her fall.    Allergies:   Allergies   Allergen Reactions    Strawberry Swelling       Home Medications:    Current Facility-Administered Medications:     acetaminophen (TYLENOL) tablet 650 mg, 650 mg, Oral, Q4H PRN, Jody Cunningham MD    apixaban (ELIQUIS) tablet 5 mg, 5 mg, Oral, Q12H, Jody Cunningham MD, 5 mg at 11/18/23 2337    sennosides-docusate (PERICOLACE) 8.6-50 MG per tablet 2 tablet, 2 tablet, Oral, BID **AND** polyethylene glycol (MIRALAX) packet 17 g, 17 g, Oral, Daily PRN **AND** bisacodyl (DULCOLAX) EC tablet 5 mg, 5 mg, Oral, Daily PRN **AND** bisacodyl (DULCOLAX) suppository 10 mg, 10 mg, Rectal, Daily PRN, Jody Cunningham MD    cetirizine (zyrTEC) tablet 10 mg, 10 mg, Oral, Daily, Jody Cunningham MD    digoxin (LANOXIN) tablet 125 mcg, 125 mcg, Oral, Daily, Jody Cunningham MD, 125 mcg at 11/18/23 8237    HYDROcodone-acetaminophen (NORCO) 5-325 MG per tablet 1 tablet, 1 tablet, Oral, Q4H PRN, Stingl,  Jody Owen MD, 1 tablet at 11/19/23 0454    HYDROmorphone (DILAUDID) injection 0.5 mg, 0.5 mg, Intravenous, Q2H PRN **AND** naloxone (NARCAN) injection 0.4 mg, 0.4 mg, Intravenous, Q5 Min PRN, Jody Cunningham MD    melatonin tablet 3 mg, 3 mg, Oral, Nightly PRN, Jody Cunningham MD    metoprolol succinate XL (TOPROL-XL) 24 hr tablet 12.5 mg, 12.5 mg, Oral, Daily, Jody Cunningham MD    ondansetron (ZOFRAN) tablet 4 mg, 4 mg, Oral, Q6H PRN **OR** ondansetron (ZOFRAN) injection 4 mg, 4 mg, Intravenous, Q6H PRN, Jody Cunningham MD    sodium chloride 0.9 % infusion, 75 mL/hr, Intravenous, Continuous, Jody Cunningham MD, Last Rate: 75 mL/hr at 11/19/23 0749, 75 mL/hr at 11/19/23 0749    spironolactone (ALDACTONE) half tablet 12.5 mg, 12.5 mg, Oral, Daily, Jody Cunningham MD    vitamin D (ERGOCALCIFEROL) capsule 50,000 Units, 50,000 Units, Oral, Q7 Days, Martin Watson MD    Facility-Administered Medications Ordered in Other Encounters:     Chlorhexidine Gluconate 2 % pads 2 each, 2 pad , Apply externally, BID, Jasmeet Graham MD    Current Medications:  Scheduled Meds:apixaban, 5 mg, Oral, Q12H  cetirizine, 10 mg, Oral, Daily  digoxin, 125 mcg, Oral, Daily  metoprolol succinate XL, 12.5 mg, Oral, Daily  senna-docusate sodium, 2 tablet, Oral, BID  spironolactone, 12.5 mg, Oral, Daily  vitamin D, 50,000 Units, Oral, Q7 Days      Continuous Infusions:sodium chloride, 75 mL/hr, Last Rate: 75 mL/hr (11/19/23 0749)      PRN Meds:.  acetaminophen    senna-docusate sodium **AND** polyethylene glycol **AND** bisacodyl **AND** bisacodyl    HYDROcodone-acetaminophen    HYDROmorphone **AND** naloxone    melatonin    ondansetron **OR** ondansetron    Past Medical History:   Diagnosis Date    Arthritis     Atrial fibrillation     Bone spur     CAD (coronary artery disease)     Heartburn     Mitral valve prolapse syndrome     Myocardial infarction     pt is not aware of history of MI     Osteoporosis     PONV (postoperative nausea and vomiting)     Right hip pain        Past Surgical History:   Procedure Laterality Date    CARDIAC CATHETERIZATION N/A 3/7/2019    Procedure: Right and Left Heart Cath;  Surgeon: Cam Hand MD;  Location: [facility] CATH INVASIVE LOCATION;  Service: Cardiology    CARDIAC CATHETERIZATION N/A 3/7/2019    Procedure: Coronary angiography;  Surgeon: Cam Hand MD;  Location: [facility] CATH INVASIVE LOCATION;  Service: Cardiology    CARDIAC CATHETERIZATION N/A 3/7/2019    Procedure: Left ventriculography;  Surgeon: Cam Hand MD;  Location: [facility] CATH INVASIVE LOCATION;  Service: Cardiology    COLONOSCOPY N/A 2021    Procedure: COLONOSCOPY TO CECUM;  Surgeon: Mary Tsai MD;  Location: [facility] ENDOSCOPY;  Service: Gastroenterology;  Laterality: N/A;  PRE: SCREENING, FAMILY HX COLON POLYPS  POST: DIVERTICULOSIS, HEMORRHOIDS    LAPAROSCOPIC CHOLECYSTECTOMY W/ CHOLANGIOGRAPHY      MITRAL VALVE REPLACEMENT      SHOULDER SURGERY Left     TOTAL HIP ARTHROPLASTY Right 5/10/2021    Procedure: TOTAL HIP ARTHROPLASTY ANTERIOR WITH HANA TABLE;  Surgeon: Jasmeet Graham MD;  Location: [facility] MAIN OR;  Service: Orthopedics;  Laterality: Right;       Social History     Occupational History    Occupation: professor   Tobacco Use    Smoking status: Former     Packs/day: .5     Types: Cigarettes     Quit date:      Years since quittin.8    Smokeless tobacco: Never   Vaping Use    Vaping Use: Never used   Substance and Sexual Activity    Alcohol use: Yes     Alcohol/week: 3.0 standard drinks of alcohol     Types: 1 Glasses of wine, 1 Cans of beer, 1 Shots of liquor per week     Comment: 8 PER WEEK    Drug use: No    Sexual activity: Defer      Social History     Social History Narrative    Not on file       Family History   Problem Relation Age of Onset    Brain cancer Mother     Heart disease Father     Brain cancer Father     Stomach cancer Maternal Grandmother      Cancer Maternal Grandfather     No Known Problems Paternal Grandmother     No Known Problems Paternal Grandfather     Malig Hyperthermia Neg Hx        Review of Systems:     Constitutional:  Denies fever, shaking or chills         All other pertinent positives and negatives as noted above in HPI.    Physical Exam: 73 y.o. female    Vitals:    11/18/23 1811 11/18/23 1940 11/18/23 2330 11/19/23 0725   BP:  116/70 125/71 94/63   BP Location:  Left arm Left arm Left arm   Patient Position:  Sitting Sitting Lying   Pulse: 66 86 60 67   Resp:  18 18 18   Temp:  97.9 °F (36.6 °C) 98.8 °F (37.1 °C) 98.1 °F (36.7 °C)   TempSrc:  Oral Oral Oral   SpO2: 98% 99% 97% 99%   Weight:  60.4 kg (133 lb 2.5 oz)     Height:         General:  Awake, alert. No acute distress.        Extremities: Right lower extremity:  Skin appears benign without obvious lacerations, ulcerations or lesions.  No gross deformity of malalignment noted.  Compartments soft without evidence for DVT or compartment syndrome.  No atrophy.  No palpable masses or adenopathy.  Focal tenderness noted over pubic area.  ROM hip range of motion tolerated little groin discomfort noted.   No obvious instability although exam is limited due to discomfort.  Strength well-preserved distally.  Sensation to light touch grossly intact distally.  Good skin turgor, brisk cap refill and good pulses distally.    Left lower extremity evaluated some swelling and ecchymosis laterally over the distal fibula.  Range of motion limited due to some pain and swelling.  Motor and sensory intact distally.  Compartment soft compressible.    All other extremities atraumatic without gross abnormality.     Diagnostic Tests:    Admission on 11/18/2023   Component Date Value Ref Range Status    Glucose 11/18/2023 100 (H)  65 - 99 mg/dL Final    BUN 11/18/2023 15  8 - 23 mg/dL Final    Creatinine 11/18/2023 0.66  0.57 - 1.00 mg/dL Final    Sodium 11/18/2023 142  136 - 145 mmol/L Final     Potassium 11/18/2023 3.9  3.5 - 5.2 mmol/L Final    Chloride 11/18/2023 108 (H)  98 - 107 mmol/L Final    CO2 11/18/2023 25.0  22.0 - 29.0 mmol/L Final    Calcium 11/18/2023 9.7  8.6 - 10.5 mg/dL Final    Total Protein 11/18/2023 6.3  6.0 - 8.5 g/dL Final    Albumin 11/18/2023 4.3  3.5 - 5.2 g/dL Final    ALT (SGPT) 11/18/2023 19  1 - 33 U/L Final    AST (SGOT) 11/18/2023 18  1 - 32 U/L Final    Alkaline Phosphatase 11/18/2023 62  39 - 117 U/L Final    Total Bilirubin 11/18/2023 0.5  0.0 - 1.2 mg/dL Final    Globulin 11/18/2023 2.0  gm/dL Final    A/G Ratio 11/18/2023 2.2  g/dL Final    BUN/Creatinine Ratio 11/18/2023 22.7  7.0 - 25.0 Final    Anion Gap 11/18/2023 9.0  5.0 - 15.0 mmol/L Final    eGFR 11/18/2023 93.3  >60.0 mL/min/1.73 Final    QT Interval 11/18/2023 382  ms Preliminary    QTC Interval 11/18/2023 369  ms Preliminary    HS Troponin T 11/18/2023 19 (H)  <14 ng/L Final    WBC 11/18/2023 8.17  3.40 - 10.80 10*3/mm3 Final    RBC 11/18/2023 4.07  3.77 - 5.28 10*6/mm3 Final    Hemoglobin 11/18/2023 13.0  12.0 - 15.9 g/dL Final    Hematocrit 11/18/2023 38.8  34.0 - 46.6 % Final    MCV 11/18/2023 95.3  79.0 - 97.0 fL Final    MCH 11/18/2023 31.9  26.6 - 33.0 pg Final    MCHC 11/18/2023 33.5  31.5 - 35.7 g/dL Final    RDW 11/18/2023 12.7  12.3 - 15.4 % Final    RDW-SD 11/18/2023 44.3  37.0 - 54.0 fl Final    MPV 11/18/2023 10.4  6.0 - 12.0 fL Final    Platelets 11/18/2023 163  140 - 450 10*3/mm3 Final    Neutrophil % 11/18/2023 75.4  42.7 - 76.0 % Final    Lymphocyte % 11/18/2023 17.9 (L)  19.6 - 45.3 % Final    Monocyte % 11/18/2023 5.0  5.0 - 12.0 % Final    Eosinophil % 11/18/2023 0.9  0.3 - 6.2 % Final    Basophil % 11/18/2023 0.2  0.0 - 1.5 % Final    Immature Grans % 11/18/2023 0.6 (H)  0.0 - 0.5 % Final    Neutrophils, Absolute 11/18/2023 6.16  1.70 - 7.00 10*3/mm3 Final    Lymphocytes, Absolute 11/18/2023 1.46  0.70 - 3.10 10*3/mm3 Final    Monocytes, Absolute 11/18/2023 0.41  0.10 - 0.90 10*3/mm3  Final    Eosinophils, Absolute 11/18/2023 0.07  0.00 - 0.40 10*3/mm3 Final    Basophils, Absolute 11/18/2023 0.02  0.00 - 0.20 10*3/mm3 Final    Immature Grans, Absolute 11/18/2023 0.05  0.00 - 0.05 10*3/mm3 Final    nRBC 11/18/2023 0.0  0.0 - 0.2 /100 WBC Final    Glucose 11/19/2023 100 (H)  65 - 99 mg/dL Final    BUN 11/19/2023 16  8 - 23 mg/dL Final    Creatinine 11/19/2023 0.73  0.57 - 1.00 mg/dL Final    Sodium 11/19/2023 141  136 - 145 mmol/L Final    Potassium 11/19/2023 3.6  3.5 - 5.2 mmol/L Final    Chloride 11/19/2023 109 (H)  98 - 107 mmol/L Final    CO2 11/19/2023 24.0  22.0 - 29.0 mmol/L Final    Calcium 11/19/2023 8.6  8.6 - 10.5 mg/dL Final    BUN/Creatinine Ratio 11/19/2023 21.9  7.0 - 25.0 Final    Anion Gap 11/19/2023 8.0  5.0 - 15.0 mmol/L Final    eGFR 11/19/2023 87.5  >60.0 mL/min/1.73 Final    WBC 11/19/2023 6.45  3.40 - 10.80 10*3/mm3 Final    RBC 11/19/2023 3.47 (L)  3.77 - 5.28 10*6/mm3 Final    Hemoglobin 11/19/2023 11.1 (L)  12.0 - 15.9 g/dL Final    Hematocrit 11/19/2023 32.1 (L)  34.0 - 46.6 % Final    MCV 11/19/2023 92.5  79.0 - 97.0 fL Final    MCH 11/19/2023 32.0  26.6 - 33.0 pg Final    MCHC 11/19/2023 34.6  31.5 - 35.7 g/dL Final    RDW 11/19/2023 12.2 (L)  12.3 - 15.4 % Final    RDW-SD 11/19/2023 41.2  37.0 - 54.0 fl Final    MPV 11/19/2023 10.3  6.0 - 12.0 fL Final    Platelets 11/19/2023 142  140 - 450 10*3/mm3 Final     Lab Results (last 24 hours)       Procedure Component Value Units Date/Time    Basic Metabolic Panel [[id number]]  (Abnormal) Collected: 11/19/23 0340    Specimen: Blood Updated: 11/19/23 0417     Glucose 100 mg/dL      BUN 16 mg/dL      Creatinine 0.73 mg/dL      Sodium 141 mmol/L      Potassium 3.6 mmol/L      Chloride 109 mmol/L      CO2 24.0 mmol/L      Calcium 8.6 mg/dL      BUN/Creatinine Ratio 21.9     Anion Gap 8.0 mmol/L      eGFR 87.5 mL/min/1.73     Narrative:      GFR Normal >60  Chronic Kidney Disease <60  Kidney Failure <15    The GFR formula is only  valid for adults with stable renal function between ages 18 and 70.    CBC (No Diff) [[id number]]  (Abnormal) Collected: 11/19/23 0340    Specimen: Blood Updated: 11/19/23 0414     WBC 6.45 10*3/mm3      RBC 3.47 10*6/mm3      Hemoglobin 11.1 g/dL      Hematocrit 32.1 %      MCV 92.5 fL      MCH 32.0 pg      MCHC 34.6 g/dL      RDW 12.2 %      RDW-SD 41.2 fl      MPV 10.3 fL      Platelets 142 10*3/mm3     Comprehensive Metabolic Panel [[id number]]  (Abnormal) Collected: 11/18/23 1657    Specimen: Blood Updated: 11/18/23 1739     Glucose 100 mg/dL      BUN 15 mg/dL      Creatinine 0.66 mg/dL      Sodium 142 mmol/L      Potassium 3.9 mmol/L      Chloride 108 mmol/L      CO2 25.0 mmol/L      Calcium 9.7 mg/dL      Total Protein 6.3 g/dL      Albumin 4.3 g/dL      ALT (SGPT) 19 U/L      AST (SGOT) 18 U/L      Alkaline Phosphatase 62 U/L      Total Bilirubin 0.5 mg/dL      Globulin 2.0 gm/dL      A/G Ratio 2.2 g/dL      BUN/Creatinine Ratio 22.7     Anion Gap 9.0 mmol/L      eGFR 93.3 mL/min/1.73     Narrative:      GFR Normal >60  Chronic Kidney Disease <60  Kidney Failure <15    The GFR formula is only valid for adults with stable renal function between ages 18 and 70.    Single High Sensitivity Troponin T [[id number]]  (Abnormal) Collected: 11/18/23 1657    Specimen: Blood Updated: 11/18/23 1739     HS Troponin T 19 ng/L     Narrative:      High Sensitive Troponin T Reference Range:  <14.0 ng/L- Negative Female for AMI  <22.0 ng/L- Negative Male for AMI  >=14 - Abnormal Female indicating possible myocardial injury.  >=22 - Abnormal Male indicating possible myocardial injury.   Clinicians would have to utilize clinical acumen, EKG, Troponin, and serial changes to determine if it is an Acute Myocardial Infarction or myocardial injury due to an underlying chronic condition.         CBC & Differential [[id number]]  (Abnormal) Collected: 11/18/23 1657    Specimen: Blood Updated: 11/18/23 1718    Narrative:      The following  orders were created for panel order CBC & Differential.  Procedure                               Abnormality         Status                     ---------                               -----------         ------                     CBC Auto Differential[[id number]]        Abnormal            Final result                 Please view results for these tests on the individual orders.    CBC Auto Differential [[id number]]  (Abnormal) Collected: 11/18/23 1657    Specimen: Blood Updated: 11/18/23 1718     WBC 8.17 10*3/mm3      RBC 4.07 10*6/mm3      Hemoglobin 13.0 g/dL      Hematocrit 38.8 %      MCV 95.3 fL      MCH 31.9 pg      MCHC 33.5 g/dL      RDW 12.7 %      RDW-SD 44.3 fl      MPV 10.4 fL      Platelets 163 10*3/mm3      Neutrophil % 75.4 %      Lymphocyte % 17.9 %      Monocyte % 5.0 %      Eosinophil % 0.9 %      Basophil % 0.2 %      Immature Grans % 0.6 %      Neutrophils, Absolute 6.16 10*3/mm3      Lymphocytes, Absolute 1.46 10*3/mm3      Monocytes, Absolute 0.41 10*3/mm3      Eosinophils, Absolute 0.07 10*3/mm3      Basophils, Absolute 0.02 10*3/mm3      Immature Grans, Absolute 0.05 10*3/mm3      nRBC 0.0 /100 WBC             Imaging: Imaging reviewed.  Hip imaging does show superior pubic rami fracture about the root with extension more medially.  Acetabulum and joint appear to be intact.  Hip joint replacement appears to be in satisfactory position no evidence of loosening or subsidence.    Ankle imaging was reviewed does show distal fibula avulsion fracture.    Assessment: Right superior pubic rami fracture, left ankle distal fibula avulsion fracture    Plan: I discussed the findings with the patient.  Told her at this time we can plan conservative treatment she will be protected weightbearing to the right lower extremity with a walker for at least 6 weeks.  She will be weightbearing as tolerated to the left lower extremity.  She does need a tall cam boot for better stability.  We also recommend vitamin  D for bone healing and health.  If she finds her self not ambulating and getting around much over the next 2 to 6 weeks recommended daily aspirin for DVT prophylaxis.  Ice and elevate left lower extremity.  Okay to remove the boot to shower.  Okay to remove the boot 2-3 times a day for icing and elevation if needed.  Plan to have her follow-up with orthopedics in 3 weeks.  Please call with any other questions or concerns thank you    Date: 11/19/2023    Martin Watson MD    CC: Tomas Landeros MD

## 2023-11-19 NOTE — PLAN OF CARE
Goal Outcome Evaluation:  Plan of Care Reviewed With: patient        Progress: no change  Outcome Evaluation: Admit from ER after fall at home, L ankle fx, R ramus fx and R forehead lac/bruising. Maintained on R/A. PRN pain med for c/o's of R groin/hip discomfort with good effect. Telemetry Afib 50's -60's. Safety maintained.

## 2023-11-19 NOTE — PROGRESS NOTES
Name: Rasheeda Balderas ADMIT: 2023   : 1950  PCP: Malini Simeon MD    MRN: [id number] LOS: 0 days   AGE/SEX: 73 y.o. female  ROOM: [facility]     Subjective   Subjective   No new complaints. She is hoping to go home.     Objective   Objective   Vital Signs  Temp:  [96.1 °F (35.6 °C)-98.8 °F (37.1 °C)] 98.1 °F (36.7 °C)  Heart Rate:  [60-86] 63  Resp:  [18] 18  BP: ()/(61-71) 100/69  SpO2:  [91 %-100 %] 99 %  on   ;   Device (Oxygen Therapy): room air  Body mass index is 22.16 kg/m².    Physical Exam  Constitutional:       General: She is not in acute distress.     Appearance: Normal appearance. She is not toxic-appearing.   HENT:      Head: Normocephalic.      Comments: Bruising right orbit  Eyes:      Extraocular Movements: Extraocular movements intact.   Cardiovascular:      Rate and Rhythm: Normal rate. Rhythm irregular.   Pulmonary:      Effort: Pulmonary effort is normal. No respiratory distress.      Breath sounds: Normal breath sounds. No wheezing or rhonchi.   Abdominal:      General: Bowel sounds are normal.      Palpations: Abdomen is soft.      Tenderness: There is no abdominal tenderness. There is no guarding or rebound.   Musculoskeletal:         General: No swelling.      Cervical back: Normal range of motion.   Skin:     General: Skin is warm and dry.   Neurological:      General: No focal deficit present.      Mental Status: She is alert and oriented to person, place, and time.   Psychiatric:         Mood and Affect: Mood normal.         Behavior: Behavior normal.         Thought Content: Thought content normal.       Results Review  I reviewed the patient's new clinical results.  Results from last 7 days   Lab Units 23  0340 23  1657   WBC 10*3/mm3 6.45 8.17   HEMOGLOBIN g/dL 11.1* 13.0   PLATELETS 10*3/mm3 142 163     Results from last 7 days   Lab Units 23  0340 23  1657   SODIUM mmol/L 141 142   POTASSIUM mmol/L 3.6 3.9   CHLORIDE mmol/L 109* 108*  "  CO2 mmol/L 24.0 25.0   BUN mg/dL 16 15   CREATININE mg/dL 0.73 0.66   GLUCOSE mg/dL 100* 100*     Lab Results   Component Value Date    ANIONGAP 8.0 11/19/2023     Estimated Creatinine Clearance: 66.4 mL/min (by C-G formula based on SCr of 0.73 mg/dL).   Lab Results   Component Value Date    EGFR 87.5 11/19/2023     Results from last 7 days   Lab Units 11/18/23  1657   ALBUMIN g/dL 4.3   BILIRUBIN mg/dL 0.5   ALK PHOS U/L 62   AST (SGOT) U/L 18   ALT (SGPT) U/L 19     Results from last 7 days   Lab Units 11/19/23  0340 11/18/23  1657   CALCIUM mg/dL 8.6 9.7   ALBUMIN g/dL  --  4.3       No results found for: \"HGBA1C\", \"POCGLU\"    CT Cervical Spine Without Contrast    Result Date: 11/18/2023  Impression: 1.  Small focus of asymmetric ill-defined hypodensity within the left external capsule most suggestive of infarct of indeterminate age. Further evaluation with MRI of the brain is recommended to better characterize. 2.  No findings of acute cervical spine fracture. 3.  Other findings as above.   This report was finalized on 11/18/2023 4:29 PM by Dr. Daniel Sierra M.D on Workstation: [id number]      CT Head Without Contrast    Result Date: 11/18/2023  Impression: 1.  Small focus of asymmetric ill-defined hypodensity within the left external capsule most suggestive of infarct of indeterminate age. Further evaluation with MRI of the brain is recommended to better characterize. 2.  No findings of acute cervical spine fracture. 3.  Other findings as above.   This report was finalized on 11/18/2023 4:29 PM by Dr. Daniel Sierra M.D on Workstation: [id number]      XR Ankle 3+ View Left    Result Date: 11/18/2023   As described.    This report was finalized on 11/18/2023 3:57 PM by Dr. Vadim Spicer M.D on Workstation: [id number]      XR Hip With or Without Pelvis 2 - 3 View Right    Result Date: 11/18/2023   As described.    This report was finalized on 11/18/2023 3:45 PM by Dr. Vadim Spicer M.D on Workstation: " [email or web address]       Scheduled Meds  apixaban, 5 mg, Oral, Q12H  cetirizine, 10 mg, Oral, Daily  digoxin, 125 mcg, Oral, Daily  metoprolol succinate XL, 12.5 mg, Oral, Daily  senna-docusate sodium, 2 tablet, Oral, BID  spironolactone, 12.5 mg, Oral, Daily  vitamin D, 50,000 Units, Oral, Q7 Days    Continuous Infusions   PRN Meds    acetaminophen    senna-docusate sodium **AND** polyethylene glycol **AND** bisacodyl **AND** bisacodyl    diazePAM    HYDROcodone-acetaminophen    HYDROmorphone **AND** naloxone    melatonin    ondansetron **OR** ondansetron     Diet  Diet: Cardiac Diets; Healthy Heart (2-3 Na+); Texture: Regular Texture (IDDSI 7); Fluid Consistency: Thin (IDDSI 0)    I have personally reviewed:  [x]  Medications  [x]  Laboratory   []  Microbiology   [x]  Radiology   [x]  EKG/Telemetry atrial fibrillation  []  Cardiology/Vascular   []  Pathology   []  Records      Assessment/Plan     Active Hospital Problems    Diagnosis  POA    **Ankle fracture [S82.899A]  Yes    Pelvic fracture [S32.9XXA]  Unknown    Abnormal CT of brain [R90.89]  Unknown    Paroxysmal atrial fibrillation [I48.0]  Yes    S/P MVR (mitral valve repair) [Z98.890]  Not Applicable    Long term (current) use of anticoagulants [Z79.01] [Z79.01]  Not Applicable      Resolved Hospital Problems   No resolved problems to display.     73 y.o. female had a mechanical fall, left ankle fracture, right hip pain    Mechanical fall, no syncope or loss of conscious  Right superior pubic rami fracture  Left ankle distal fibula avulsion fracture  Scalp laceration repaired in ED  Orthopedic surgery recommends conservative management: Nonweightbearing right lower extremity with walker for at least 6 weeks. Weightbearing as tolerated left lower extremity. Tall cam boot. Vitamin D. Ortho recommended daily aspirin for DVT prophylaxis but she is already on apixaban. Ice and elevation. Remove boot 2-3 times a day for icing and elevation if needed. Follow-up Ortho in 2  weeks    Atrial fibrillation  Rate is controlled. She is on dig and metoprolol  She is on anticoagulation with apixaban  CT head shows indeterminant infarct. Will check MRI. She denies any previous history of stroke or TIA    Discussed with patient and nursing staff    Expected Discharge Date: 11/20/2023; Expected Discharge Time: Depending on MRI results    Tomas Landeros MD  [facility]  11/19/23

## 2023-11-19 NOTE — PLAN OF CARE
Goal Outcome Evaluation:  Plan of Care Reviewed With: patient           Outcome Evaluation: Pt is a 72 yo F admitted from home after a fall. Work-up revealed pubic rami fx and L distal fibular avulsion fx. Per ortho, pt to be protected WBing RLE with a rwx and WBAT LLE in a CAM boot. Pt lives with her  and reports independence at BL with no AD - reports she and her  walk 40 minutes everyday. Pt presents to PT with impaired balance with boot and R groin pain limiting overall mobility. Pt transferred to EOB with SBA, difficulty moving RLE towards EOB but otherwise independent. Donned R shoe to assist with balance using CAM boot. Pt stood with SBA and ambulated 70ft with rwx and SBA. Pt unsteady but independent with rwx use. Pt c/o R groin pain radiating into her back with progressed gait distance prompting return to room. Pt agreeable to sitting [facility] to eat lunch, left with needs met. Encouraged pt to call out for assist up to bathroom and walking in hallway as tolerated. Anticipate DC home with OPPT to f/u and family assist as needed.      Anticipated Discharge Disposition (PT): home with assist, home with outpatient therapy services

## 2023-11-20 ENCOUNTER — DOCUMENTATION (OUTPATIENT)
Dept: HOME HEALTH SERVICES | Facility: HOME HEALTHCARE | Age: 73
End: 2023-11-20
Payer: MEDICARE

## 2023-11-20 ENCOUNTER — HOME HEALTH ADMISSION (OUTPATIENT)
Dept: HOME HEALTH SERVICES | Facility: HOME HEALTHCARE | Age: 73
End: 2023-11-20
Payer: MEDICARE

## 2023-11-20 VITALS
HEIGHT: 65 IN | TEMPERATURE: 98.4 F | HEART RATE: 76 BPM | OXYGEN SATURATION: 97 % | BODY MASS INDEX: 22.19 KG/M2 | SYSTOLIC BLOOD PRESSURE: 108 MMHG | RESPIRATION RATE: 18 BRPM | WEIGHT: 133.16 LBS | DIASTOLIC BLOOD PRESSURE: 72 MMHG

## 2023-11-20 PROCEDURE — G0378 HOSPITAL OBSERVATION PER HR: HCPCS

## 2023-11-20 PROCEDURE — 97530 THERAPEUTIC ACTIVITIES: CPT

## 2023-11-20 RX ORDER — HYDROCODONE BITARTRATE AND ACETAMINOPHEN 5; 325 MG/1; MG/1
1 TABLET ORAL EVERY 4 HOURS PRN
Qty: 10 TABLET | Refills: 0 | Status: SHIPPED | OUTPATIENT
Start: 2023-11-20 | End: 2023-11-28

## 2023-11-20 RX ADMIN — DOCUSATE SODIUM 50MG AND SENNOSIDES 8.6MG 2 TABLET: 8.6; 5 TABLET, FILM COATED ORAL at 09:11

## 2023-11-20 RX ADMIN — CETIRIZINE HYDROCHLORIDE 10 MG: 10 TABLET ORAL at 09:11

## 2023-11-20 RX ADMIN — DIGOXIN 125 MCG: 125 TABLET ORAL at 09:10

## 2023-11-20 RX ADMIN — HYDROCODONE BITARTRATE AND ACETAMINOPHEN 1 TABLET: 5; 325 TABLET ORAL at 07:25

## 2023-11-20 RX ADMIN — APIXABAN 5 MG: 5 TABLET, FILM COATED ORAL at 09:10

## 2023-11-20 RX ADMIN — HYDROCODONE BITARTRATE AND ACETAMINOPHEN 1 TABLET: 5; 325 TABLET ORAL at 01:56

## 2023-11-20 RX ADMIN — METOPROLOL SUCCINATE 12.5 MG: 25 TABLET, EXTENDED RELEASE ORAL at 09:10

## 2023-11-20 NOTE — CASE MANAGEMENT/SOCIAL WORK
Discharge Planning Assessment  [facility]     Patient Name: Rasheeda Shah  MRN: [id number]  Today's Date: 11/20/2023    Admit Date: 11/18/2023    Plan: Home, spouse to transport   Discharge Needs Assessment       Row Name 11/20/23 1019       Living Environment    People in Home spouse    Name(s) of People in Home Matteo shah [phone]    Current Living Arrangements home    Potentially Unsafe Housing Conditions none    In the past 12 months has the electric, gas, oil, or water company threatened to shut off services in your home? No    Primary Care Provided by self    Provides Primary Care For no one    Family Caregiver if Needed spouse    Family Caregiver Names Matteo Shah    Quality of Family Relationships helpful;involved    Able to Return to Prior Arrangements yes       Resource/Environmental Concerns    Resource/Environmental Concerns none    Transportation Concerns none       Food Insecurity    Within the past 12 months, you worried that your food would run out before you got the money to buy more. Never true    Within the past 12 months, the food you bought just didn't last and you didn't have money to get more. Never true       Transition Planning    Patient/Family Anticipates Transition to home;home with family    Patient/Family Anticipated Services at Transition none    Transportation Anticipated car, drives self;family or friend will provide       Discharge Needs Assessment    Equipment Currently Used at Home walker, rolling    Concerns to be Addressed no discharge needs identified;denies needs/concerns at this time    Anticipated Changes Related to Illness none    Equipment Needed After Discharge none    Provided Post Acute Provider List? N/A    Provided Post Acute Provider Quality & Resource List? N/A                   Discharge Plan       Row Name 11/20/23 1021       Plan    Plan Home, spouse to transport    Plan Comments Met with pt at bedside. Introduced self and explained role of case  manager. Face sheet verified, PCP is Malini Simeon. Pt denies any difficulty paying for medications, and she obtains her medications from [facility], however at discharge, she would like to receive from [facility]. Pt lives with her spouse, Matteo, and she stated that he can assist with any care needs that may arise. Pt has 2 steps to enter her home and she stated that she would be able to enter her home with her walker. Pt is independent in ADL's, and will be using a walker for mobility due to her ankle fracture. Pt denies the need for any other assistive device. Pt has had [facility] in the past and has never been to rehab. Pt does not anticipate requiring any of these services upon discharge.  Upon discharge, pts spouse will transport home. Explained that CCP would follow to assess for discharge needs.                  Continued Care and Services - Admitted Since 11/18/2023    Coordination has not been started for this encounter.       Expected Discharge Date and Time       Expected Discharge Date Expected Discharge Time    Nov 20, 2023            Demographic Summary    No documentation.                  Functional Status    No documentation.                  Psychosocial    No documentation.                  Abuse/Neglect    No documentation.                  Legal    No documentation.                  Substance Abuse    No documentation.                  Patient Forms    No documentation.                     Stephani Chan RN

## 2023-11-20 NOTE — PROGRESS NOTES
[facility] will follow post hospital as requested. Patient agreeable to service. Contact information confirmed.

## 2023-11-20 NOTE — CASE MANAGEMENT/SOCIAL WORK
Continued Stay Note  [facility]     Patient Name: Rasheeda Balderas  MRN: [id number]  Today's Date: 11/20/2023    Admit Date: 11/18/2023    Plan: Home, family to transport   Discharge Plan       Row Name 11/20/23 1203       Plan    Plan Home, family to transport    Plan Comments Spoke with pt and informed that the Orthopedist ordered home health for PT. Pt prefers [facility]. Referral placed, informed V[facility]  of referral.      Row Name 11/20/23 1021       Plan    Plan Home, spouse to transport    Plan Comments Met with pt at bedside. Introduced self and explained role of . Face sheet verified, PCP is Malini Simeon. Pt denies any difficulty paying for medications, and she obtains her medications from [facility], however at discharge, she would like to receive from [facility]. Pt lives with her spouse, Matteo, and she stated that he can assist with any care needs that may arise. Pt has 2 steps to enter her home and she stated that she would be able to enter her home with her walker. Pt is independent in ADL's, and will be using a walker for mobility due to her ankle fracture. Pt denies the need for any other assistive device. Pt has had [facility] in the past and has never been to rehab. Pt does not anticipate requiring any of these services upon discharge.  Upon discharge, pts spouse will transport home. Explained that CCP would follow to assess for discharge needs.                   Discharge Codes    No documentation.                 Expected Discharge Date and Time       Expected Discharge Date Expected Discharge Time    Nov 20, 2023               Stephani Chan RN

## 2023-11-20 NOTE — DISCHARGE SUMMARY
[facility]    Date of Discharge:  11/20/2023    PCP: Malini Simeon MD    Discharge Diagnosis:   Active Hospital Problems    Diagnosis  POA    **Ankle fracture [S82.899A]  Yes    Pelvic fracture [S32.9XXA]  Unknown    Abnormal CT of brain [R90.89]  Unknown    Paroxysmal atrial fibrillation [I48.0]  Yes    S/P MVR (mitral valve repair) [Z98.890]  Not Applicable    Long term (current) use of anticoagulants [Z79.01] [Z79.01]  Not Applicable      Resolved Hospital Problems   No resolved problems to display.          Consults       Date and Time Order Name Status Description    11/18/2023  6:04 PM Inpatient Orthopedic Surgery Consult Completed           Hospital Course  73 y.o. female who came to the hospital after she had a fall while going for a walk with her  when she lost her balance.  X-rays demonstrated right superior pubic rami fracture with left ankle distal fibular avulsion fracture.  We treated with as needed pain control consult orthopedics.  At this time Dr. Watson has advised conservative management.  He advises a Cam walking boot and protected weightbearing as tolerated for the next 6 weeks.  She was found to have vitamin D deficiency and has been initiated on supplemental dosing at 50,000 units weekly.  Orthopedics is good to utilize aspirin for DVT prophylaxis and they are okay with removal of the boot for showering as well as icing 2-3 times a day and they will follow-up in about 3 weeks.  She does have some trauma to her right orbital/forehead where she has some bruising with a scalp laceration but further imaging with the MRI shows no concern for anything intracranially that is acute.  She is anticoagulated on Eliquis and this was continued due to her past history of A-fib which is rate controlled on digoxin and metoprolol.  CT of the head did not demonstrate any bleeding and the MRI ruled out any concerns for an indeterminant  infarct and patient denies any previous issues with stroke or TIA.  PT has evaluated and at this juncture the patient is clinically stable to be discharged to home.  I have ordered home health for PT.  Patient claims she already has a walker at home from previous needs/fractures.  Otherwise she and was ambulating independently prior to admission.        Temp:  [97.3 °F (36.3 °C)-98.1 °F (36.7 °C)] 98.1 °F (36.7 °C)  Heart Rate:  [62-81] 76  Resp:  [18] 18  BP: (101-120)/(58-80) 120/58  Body mass index is 22.16 kg/m².    Physical Exam  Constitutional:       Comments: Fluent speech.  Mentation appropriate.  No family at bedside.  Sitting up reading a book upon entering room   HENT:      Head: Normocephalic.      Comments: Stable bruising to right parietal/orbital     Nose: Nose normal.      Mouth/Throat:      Mouth: Mucous membranes are moist.      Pharynx: Oropharynx is clear.   Eyes:      Extraocular Movements: Extraocular movements intact.      Conjunctiva/sclera: Conjunctivae normal.      Pupils: Pupils are equal, round, and reactive to light.   Cardiovascular:      Rate and Rhythm: Normal rate and regular rhythm.   Pulmonary:      Effort: Pulmonary effort is normal. No respiratory distress.      Breath sounds: Normal breath sounds.   Abdominal:      General: Bowel sounds are normal. There is no distension.      Palpations: Abdomen is soft.      Tenderness: There is no abdominal tenderness.   Musculoskeletal:         General: No swelling.   Skin:     General: Skin is warm and dry.      Coloration: Skin is not jaundiced.   Neurological:      Mental Status: She is alert and oriented to person, place, and time.      Cranial Nerves: No cranial nerve deficit.   Psychiatric:         Mood and Affect: Mood normal.         Behavior: Behavior normal.         Thought Content: Thought content normal.         Judgment: Judgment normal.       Disposition: Home or Self Care       Discharge Medications        New Medications         Instructions Start Date   HYDROcodone-acetaminophen 5-325 MG per tablet  Commonly known as: NORCO   1 tablet, Oral, Every 4 Hours PRN      vitamin D 1.25 MG (94878 UT) capsule capsule  Commonly known as: ERGOCALCIFEROL   50,000 Units, Oral, Every 7 Days             Continue These Medications        Instructions Start Date   acetaminophen 500 MG tablet  Commonly known as: TYLENOL   500 mg, Oral, Every 6 Hours PRN      digoxin 125 MCG tablet  Commonly known as: LANOXIN   TAKE 1 TABLET DAILY      Eliquis 5 MG tablet tablet  Generic drug: apixaban   TAKE 1 TABLET EVERY 12 HOURS      loratadine 10 MG tablet  Commonly known as: CLARITIN   10 mg, Oral, Daily      metoprolol succinate XL 25 MG 24 hr tablet  Commonly known as: TOPROL-XL   TAKE ONE-HALF (1/2) TABLET DAILY      spironolactone 25 MG tablet  Commonly known as: ALDACTONE   TAKE ONE-HALF (1/2) TABLET DAILY               Activity Instructions       Discharge Activity      Patient is protected weightbearing to the right lower extremity with walker.  She is weightbearing as tolerated to the left lower extremity with tall cam boot on and in place.  May remove the boot to 3 times a day for icing and elevation if needed.           Additional Instructions for the Follow-ups that You Need to Schedule       Ambulatory Referral to Home Health (Hospital)   As directed      Face to Face Visit Date: 11/20/2023   Follow-up provider for Plan of Care?: I treated the patient in an acute care facility and will not continue treatment after discharge.   Follow-up provider: ITALIA LEMOS [[id number]]   Reason/Clinical Findings: Pubic rami/ankle fractures   Describe mobility limitations that make leaving home difficult: Taxing effort to leave home   Nursing/Therapeutic Services Requested: Physical Therapy   PT orders: Home safety assessment Transfer training Gait Training Strengthening   Weight Bearing Status: As Tolerated   Frequency: 1 Week 1        Discharge Follow-up with  Specialty: Orthopedics; 3 Weeks   As directed      Specialty: Orthopedics   Follow Up: 3 Weeks   Follow Up Details: Please follow-up with Dr. Martin Watson.  Call the office to schedule appointment [phone]               Follow-up Information       Malini Simeon MD .    Specialty: Internal Medicine  Contact information:  [address]  [address]  [facility] [address]  [phone]                            Future Appointments   Date Time Provider Department Center   1/3/2024  8:30 AM [facility] ECHO/VAS [facility] ECHO [facility]   1/3/2024  9:30 AM Teresita Leone APRN [facility] CD [facility]        Jayson Buckley MD  [facility]  11/20/23    Discharge time spent greater than 30 minutes.

## 2023-11-20 NOTE — CASE MANAGEMENT/SOCIAL WORK
Case Management Discharge Note      Final Note: Home with [facility], family to transport    Provided Post Acute Provider List?: N/A  Provided Post Acute Provider Quality & Resource List?: N/A    Selected Continued Care - Discharged on 11/20/2023 Admission date: 11/18/2023 - Discharge disposition: Home or Self Care      Destination    No services have been selected for the patient.                Durable Medical Equipment    No services have been selected for the patient.                Dialysis/Infusion    No services have been selected for the patient.                Home Medical Care Coordination complete.      Service Provider Selected Services Address Phone Fax Patient Preferred     [facility] ,  [facility] [address] [address] [phone] --              Therapy    No services have been selected for the patient.                Community Resources    No services have been selected for the patient.                Community & DME    No services have been selected for the patient.                    Transportation Services  Private: Car    Final Discharge Disposition Code: 06 - home with home health care

## 2023-11-20 NOTE — THERAPY TREATMENT NOTE
Patient Name: Rasheeda Balderas  : 1950    MRN: [id number]                              Today's Date: 2023       Admit Date: 2023    Visit Dx:     ICD-10-CM ICD-9-CM   1. Closed fracture of left ankle, initial encounter  S82.892A 824.8   2. Closed fracture of pubic ramus, right, initial encounter  S32.591A 808.2   3. Eyebrow laceration, right, initial encounter  S01.111A 873.42     Patient Active Problem List   Diagnosis    Mitral valve insufficiency    Osteoporosis    Mitral valve prolapse    Mitral annular calcification    Shortness of breath    Non-rheumatic mitral regurgitation    Long term (current) use of anticoagulants [Z79.01]    S/P MVR (mitral valve repair)    Paroxysmal atrial fibrillation    NICM (nonischemic cardiomyopathy)    Iron deficiency anemia    Encounter for screening for malignant neoplasm of colon    Right hip pain    Primary osteoarthritis of right hip    Osteopenia    Ankle fracture    Pelvic fracture    Abnormal CT of brain     Past Medical History:   Diagnosis Date    Arthritis     Atrial fibrillation     Bone spur     CAD (coronary artery disease)     Heartburn     Mitral valve prolapse syndrome     Myocardial infarction     pt is not aware of history of MI    Osteoporosis     PONV (postoperative nausea and vomiting)     Right hip pain      Past Surgical History:   Procedure Laterality Date    CARDIAC CATHETERIZATION N/A 3/7/2019    Procedure: Right and Left Heart Cath;  Surgeon: Cam Hand MD;  Location: [facility] CATH INVASIVE LOCATION;  Service: Cardiology    CARDIAC CATHETERIZATION N/A 3/7/2019    Procedure: Coronary angiography;  Surgeon: Cam Hand MD;  Location: [facility] CATH INVASIVE LOCATION;  Service: Cardiology    CARDIAC CATHETERIZATION N/A 3/7/2019    Procedure: Left ventriculography;  Surgeon: Cam Hand MD;  Location: [facility] CATH INVASIVE LOCATION;  Service: Cardiology    COLONOSCOPY N/A 2021    Procedure: COLONOSCOPY TO CECUM;  Surgeon:  Mary Tsai MD;  Location: [facility] ENDOSCOPY;  Service: Gastroenterology;  Laterality: N/A;  PRE: SCREENING, FAMILY HX COLON POLYPS  POST: DIVERTICULOSIS, HEMORRHOIDS    LAPAROSCOPIC CHOLECYSTECTOMY W/ CHOLANGIOGRAPHY      MITRAL VALVE REPLACEMENT      SHOULDER SURGERY Left     TOTAL HIP ARTHROPLASTY Right 5/10/2021    Procedure: TOTAL HIP ARTHROPLASTY ANTERIOR WITH HANA TABLE;  Surgeon: Jasmeet Graham MD;  Location: [facility] MAIN OR;  Service: Orthopedics;  Laterality: Right;      General Information       Row Name 11/20/23 1513          Physical Therapy Time and Intention    Document Type therapy note (daily note)  -DB     Mode of Treatment individual therapy;physical therapy  -DB       Row Name 11/20/23 1513          General Information    Patient Profile Reviewed yes  -DB               User Key  (r) = Recorded By, (t) = Taken By, (c) = Cosigned By      Initials Name Provider Type    DB Phuong Gibbons, STEVO Physical Therapist                   Mobility       Row Name 11/20/23 1513          Sit-Stand Transfer    Sit-Stand [place] (Transfers) supervision  -DB     Assistive Device (Sit-Stand Transfers) walker, front-wheeled  -DB       Row Name 11/20/23 1513          Gait/Stairs (Locomotion)    [place] Level (Gait) supervision  -DB     Assistive Device (Gait) walker, front-wheeled  -DB     Distance in Feet (Gait) 30  -DB     Deviations/Abnormal Patterns (Gait) antalgic;gait speed decreased;stride length decreased;weight shifting decreased  -DB     Bilateral Gait Deviations forward flexed posture  -DB               User Key  (r) = Recorded By, (t) = Taken By, (c) = Cosigned By      Initials Name Provider Type    DB Phuong Gibbons PT Physical Therapist                   Obj/Interventions       Row Name 11/20/23 1513          Balance    Balance Assessment sitting static balance;sitting dynamic balance;standing static balance;standing dynamic balance  -DB     Static Sitting Balance independent  -DB      Dynamic Sitting Balance independent  -DB     Position, Sitting Balance sitting in chair  -DB     Static Standing Balance supervision  -DB     Dynamic Standing Balance supervision  -DB     Position/Device Used, Standing Balance supported;walker, front-wheeled  -DB     Balance Interventions sitting;standing;sit to stand  -DB               User Key  (r) = Recorded By, (t) = Taken By, (c) = Cosigned By      Initials Name Provider Type    DB Phuong Gibbons, PT Physical Therapist                   Goals/Plan    No documentation.                  Clinical Impression       Row Name 11/20/23 1513          Pain    Pain Intervention(s) Ambulation/increased activity;Repositioned  -DB       Row Name 11/20/23 1513          Plan of Care Review    Plan of Care Reviewed With patient  -DB     Progress improving  -DB     Outcome Evaluation Pt seen today to ensure proper fit of CAM boot. Pt was able to ambulate with new boot and SV for mobility with RW. Pt denies any questions prior to D/C regarding PT. Pt safe for D/C today.  -DB       Row Name 11/20/23 1513          Vital Signs    O2 Delivery Pre Treatment room air  -DB     O2 Delivery Intra Treatment room air  -DB     O2 Delivery Post Treatment room air  -DB     Pre Patient Position Standing  -DB     Intra Patient Position Standing  -DB     Post Patient Position Sitting  -DB       Row Name 11/20/23 1513          Positioning and Restraints    Pre-Treatment Position standing in room  -DB     Post Treatment Position chair  -DB     In Chair sitting;call light within reach;encouraged to call for assist  -DB               User Key  (r) = Recorded By, (t) = Taken By, (c) = Cosigned By      Initials Name Provider Type    DB Phuong Gibbons, PT Physical Therapist                   Outcome Measures       Row Name 11/20/23 1515 11/20/23 0900       How much help from another person do you currently need...    Turning from your back to your side while in flat bed without using bedrails? 4   -DB 4  -CM    Moving from lying on back to sitting on the side of a flat bed without bedrails? 4  -DB 4  -CM    Moving to and from a bed to a chair (including a wheelchair)? 4  -DB 3  -CM    Standing up from a chair using your arms (e.g., wheelchair, bedside chair)? 4  -DB 2  -CM    Climbing 3-5 steps with a railing? 3  -DB 2  -CM    To walk in hospital room? 3  -DB 3  -CM    AM-PAC 6 Clicks Score (PT) 22  -DB 18  -CM    Highest Level of Mobility Goal 7 --> Walk 25 feet or more  -DB 6 --> Walk 10 steps or more  -CM      Row Name 11/20/23 1515          Functional Assessment    Outcome Measure Options AM-PAC 6 Clicks Basic Mobility (PT)  -DB               User Key  (r) = Recorded By, (t) = Taken By, (c) = Cosigned By      Initials Name Provider Type    Mayte Case, RN Registered Nurse    Phuong Sanchez, PT Physical Therapist                                 Physical Therapy Education       Title: PT OT SLP Therapies (In Progress)       Topic: Physical Therapy (In Progress)       Point: Mobility training (Done)       Learning Progress Summary             Patient Acceptance, E, VU by  at 11/20/2023 1516    Acceptance, E,TB,D, VU,NR by  at 11/19/2023 1344                         Point: Home exercise program (Not Started)       Learner Progress:  Not documented in this visit.              Point: Body mechanics (Done)       Learning Progress Summary             Patient Acceptance, E, VU by  at 11/20/2023 1516    Acceptance, E,TB,D, VU,NR by  at 11/19/2023 1344                         Point: Precautions (Done)       Learning Progress Summary             Patient Acceptance, E, VU by  at 11/20/2023 1516    Acceptance, E,TB,D, VU,NR by  at 11/19/2023 1344                                         User Key       Initials Effective Dates Name Provider Type Discipline     06/16/21 -  Phuong Gibbons, PT Physical Therapist PT     04/08/22 -  Lucita Guillen PT Physical Therapist PT                   PT Recommendation and Plan     Plan of Care Reviewed With: patient  Progress: improving  Outcome Evaluation: Pt seen today to ensure proper fit of CAM boot. Pt was able to ambulate with new boot and SV for mobility with RW. Pt denies any questions prior to D/C regarding PT. Pt safe for D/C today.     Time Calculation:         PT Charges       Row Name 11/20/23 1516 11/20/23 0828          Time Calculation    Start Time 1406  -DB --     Stop Time 1422  -DB --     Time Calculation (min) 16 min  -DB --     PT Received On 11/20/23  -DB --     PT - Next Appointment 11/22/23  -DB 11/21/23  -RD        Time Calculation- PT    Total Timed Code Minutes- PT 16 minute(s)  -DB --               User Key  (r) = Recorded By, (t) = Taken By, (c) = Cosigned By      Initials Name Provider Type    Estela Gunn, PT Physical Therapist    DB Phuong Gibbons, PT Physical Therapist                  Therapy Charges for Today       Code Description Service Date Service Provider Modifiers Qty    [id number]  PT THERAPEUTIC ACT EA 15 MIN 11/20/2023 Phuong Gibbons, PT GP 1            PT G-Codes  Outcome Measure Options: AM-PAC 6 Clicks Basic Mobility (PT)  AM-PAC 6 Clicks Score (PT): 22       Phuong Gibbons PT  11/20/2023

## 2023-11-20 NOTE — PLAN OF CARE
Goal Outcome Evaluation:  Plan of Care Reviewed With: patient        Progress: improving  Outcome Evaluation: Maintained on R/A. PRN pain for q4-5 hours for c/o's of R hip/groin pain with good effect. Ambulating to bathroom with SBA, walker and boot in place. Tolerating well. MRI completed. Safety maintained.

## 2023-11-20 NOTE — DISCHARGE PLACEMENT REQUEST
"Isac Hardy N (73 y.o. Female)       Date of Birth   1950    Social Security Number       Address   [address] [place] [address]    Home Phone   [phone]    MRN   [id number]       Advent   None    Marital Status                               Admission Date   11/18/23    Admission Type   Emergency    Admitting Provider   Jody Cunningham MD    Attending Provider   Jayson Buckley MD    Department, Room/Bed   [address], N433/1       Discharge Date       Discharge Disposition   Home or Self Care    Discharge Destination                                 Attending Provider: Jayson Buckley MD    Allergies: Strawberry    Isolation: None   Infection: None   Code Status: CPR    Ht: 165.1 cm (65\")   Wt: 60.4 kg (133 lb 2.5 oz)    Admission Cmt: None   Principal Problem: Ankle fracture [S82.899A]                   Active Insurance as of 11/18/2023       Primary Coverage       Payor Plan Insurance Group Employer/Plan Group    MEDICARE MEDICARE A & B        Payor Plan Address Payor Plan Phone Number Payor Plan Fax Number Effective Dates    [address] [phone]  7/1/2016 - None Entered    [facility] [id number]         Subscriber Name Subscriber Birth Date Member ID       ISAC HARDY N 1950 [id number]               Secondary Coverage       Payor Plan Insurance Group Employer/Plan Group    AARP  SUP AARP HEALTH CARE OPTIONS        Payor Plan Address Payor Plan Phone Number Payor Plan Fax Number Effective Dates    [facility] [phone]  1/1/2018 - None Entered    [address]       [facility] [address]         Subscriber Name Subscriber Birth Date Member ID       ISAC HARDY N 1950 [id number]                     Emergency Contacts        (Rel.) Home Phone Work Phone Mobile Phone    Matteo Hardy (Spouse) [phone] -- [phone]                "

## 2023-11-20 NOTE — PLAN OF CARE
Goal Outcome Evaluation:  Plan of Care Reviewed With: patient        Progress: improving  Outcome Evaluation: Pt seen today to ensure proper fit of CAM boot. Pt was able to ambulate with new boot and SV for mobility with RW. Pt denies any questions prior to D/C regarding PT. Pt safe for D/C today.

## 2023-11-21 ENCOUNTER — HOME CARE VISIT (OUTPATIENT)
Dept: HOME HEALTH SERVICES | Facility: HOME HEALTHCARE | Age: 73
End: 2023-11-21
Payer: MEDICARE

## 2023-11-21 VITALS
TEMPERATURE: 97.2 F | HEART RATE: 88 BPM | RESPIRATION RATE: 18 BRPM | DIASTOLIC BLOOD PRESSURE: 73 MMHG | SYSTOLIC BLOOD PRESSURE: 114 MMHG | OXYGEN SATURATION: 98 %

## 2023-11-21 PROCEDURE — G0151 HHCP-SERV OF PT,EA 15 MIN: HCPCS

## 2023-11-21 NOTE — HOME HEALTH
"Reason for referral/Focus of Care:  72 yo F referred to home health PT with decreased ability to transfer and amb related to R pelvic fracture, R orbital bruising, and L distal fibular avulsion fracture s/p fall when walking outside.    Subjective: \"There is no pain in my ankle at all.\" per patient    Patient's PT Goal: \"get back to walking on my own\"    Pertinent Medical History: CAD, OA, R THR    Previous level of function: IND with amb without device, walked 40 minutes daily with . IND with ADLs, driving    Social Environment/DME/Potential Barriers for Goal Attainment: lives with spouse who provides assist as needed. 2-3 steps to enter. Bed/bath on main floor. Has FWW, BSC, cane, shower seat    Skin Integrity/wound status: see wound care screen for details.    Code Status: Full    Medication issues/concerns: none identified    HB status: yes. See homebound screen for details.    Problems Identified: see Care Plan    Functional Status/Fall Risk/Safety: see PT evaluation/care plan    POC notification to Dr. Watson     on  11/21/23   via case communication.    Assessment: Skilled physical therapy is needed for 1w1, 2w2 due to decreased ability to transfer and amb related to recent R pelvic and L ankle fracture s/p fall for evaluation, gait training, ther ex, HEP, fall prevention, home safety training    Plan for next visit: gait training, transfer training, ther ex progression"

## 2023-11-24 NOTE — CASE MANAGEMENT/SOCIAL WORK
Case Management Discharge Note      Final Note: Home with [facility] to follow, family to transport    Provided Post Acute Provider List?: N/A  Provided Post Acute Provider Quality & Resource List?: N/A    Selected Continued Care - Discharged on 11/20/2023 Admission date: 11/18/2023 - Discharge disposition: Home or Self Care      Destination    No services have been selected for the patient.                Durable Medical Equipment    No services have been selected for the patient.                Dialysis/Infusion    No services have been selected for the patient.                Home Medical Care Coordination complete.      Service Provider Selected Services Address Phone Fax Patient Preferred     [facility] ,  Home Rehabilitation [address] [address] [phone] --              Therapy    No services have been selected for the patient.                Community Resources    No services have been selected for the patient.                Community & DME    No services have been selected for the patient.                    Transportation Services  Private: Car    Final Discharge Disposition Code: 06 - home with home health care

## 2023-11-28 ENCOUNTER — HOME CARE VISIT (OUTPATIENT)
Dept: HOME HEALTH SERVICES | Facility: HOME HEALTHCARE | Age: 73
End: 2023-11-28
Payer: MEDICARE

## 2023-11-28 PROCEDURE — G0157 HHC PT ASSISTANT EA 15: HCPCS

## 2023-11-29 VITALS
RESPIRATION RATE: 18 BRPM | DIASTOLIC BLOOD PRESSURE: 64 MMHG | SYSTOLIC BLOOD PRESSURE: 116 MMHG | OXYGEN SATURATION: 98 % | TEMPERATURE: 97.1 F | HEART RATE: 71 BPM

## 2023-11-29 NOTE — HOME HEALTH
Patient rpeorts doing well overall. No new concerns or questions at this time. Good adherence to HEP reported.    FALLS SINCE LAST VISIT: No    MEDICATION CHANGES: No    PLAN FOR NEXT VISIT:  Cane training  Review fall risk and safety management strategies  Review/progress home exercise program

## 2023-11-30 ENCOUNTER — HOME CARE VISIT (OUTPATIENT)
Dept: HOME HEALTH SERVICES | Facility: HOME HEALTHCARE | Age: 73
End: 2023-11-30
Payer: MEDICARE

## 2023-11-30 PROCEDURE — G0157 HHC PT ASSISTANT EA 15: HCPCS

## 2023-12-01 VITALS
OXYGEN SATURATION: 98 % | DIASTOLIC BLOOD PRESSURE: 82 MMHG | RESPIRATION RATE: 18 BRPM | HEART RATE: 71 BPM | TEMPERATURE: 97.1 F | SYSTOLIC BLOOD PRESSURE: 121 MMHG

## 2023-12-01 NOTE — HOME HEALTH
Patient reports mild soreness from HEP. Good progress. No new concerns at this time.     FALLS SINCE LAST VISIT: No    MEDICATION CHANGES: No    PLAN FOR NEXT VISIT:  Cane training  Stair management  Review fall risk and safety management strategies  Review/progress home exercise program

## 2023-12-05 ENCOUNTER — HOME CARE VISIT (OUTPATIENT)
Dept: HOME HEALTH SERVICES | Facility: HOME HEALTHCARE | Age: 73
End: 2023-12-05
Payer: MEDICARE

## 2023-12-05 PROCEDURE — G0157 HHC PT ASSISTANT EA 15: HCPCS

## 2023-12-07 ENCOUNTER — OFFICE VISIT (OUTPATIENT)
Dept: ORTHOPEDIC SURGERY | Facility: CLINIC | Age: 73
End: 2023-12-07
Payer: MEDICARE

## 2023-12-07 VITALS
OXYGEN SATURATION: 98 % | DIASTOLIC BLOOD PRESSURE: 82 MMHG | TEMPERATURE: 97.1 F | HEART RATE: 71 BPM | RESPIRATION RATE: 18 BRPM | SYSTOLIC BLOOD PRESSURE: 124 MMHG

## 2023-12-07 VITALS — TEMPERATURE: 98 F | HEIGHT: 65 IN | WEIGHT: 132.9 LBS | BODY MASS INDEX: 22.14 KG/M2

## 2023-12-07 DIAGNOSIS — R52 PAIN: ICD-10-CM

## 2023-12-07 DIAGNOSIS — S82.839A AVULSION FRACTURE OF DISTAL FIBULA: ICD-10-CM

## 2023-12-07 DIAGNOSIS — S32.591D CLOSED FRACTURE OF MULTIPLE RAMI OF RIGHT PUBIS WITH ROUTINE HEALING, SUBSEQUENT ENCOUNTER: Primary | ICD-10-CM

## 2023-12-07 RX ORDER — METAXALONE 400 MG/1
TABLET ORAL
COMMUNITY

## 2023-12-07 NOTE — PROGRESS NOTES
Patient: Rasheeda Balderas  YOB: 1950 73 y.o. female  Medical Record Number: [id number]    Chief Complaint:   Chief Complaint   Patient presents with    Left Ankle - Pain, Initial Evaluation    Right Hip - Pain, Initial Evaluation       History of Present Illness:Rasheeda Balderas is a 73 y.o. female who presents for follow-up of right pubic rami fractures, left distal fibula avulsion fracture.  Patient is been protected weightbearing with a walker for the pubic rami fractures and in a boot for the left ankle fracture.  States symptoms have improved.    Allergies:   Allergies   Allergen Reactions    Strawberry Swelling       Medications:   Current Outpatient Medications   Medication Sig Dispense Refill    acetaminophen (TYLENOL) 500 MG tablet Take 1 tablet by mouth Every 6 (Six) Hours As Needed for Mild Pain.      digoxin (LANOXIN) 125 MCG tablet TAKE 1 TABLET DAILY 90 tablet 3    Eliquis 5 MG tablet tablet TAKE 1 TABLET EVERY 12 HOURS 180 tablet 3    loratadine (CLARITIN) 10 MG tablet Take 1 tablet by mouth Daily.      metaxalone (SKELAXIN) 400 MG tablet       metoprolol succinate XL (TOPROL-XL) 25 MG 24 hr tablet TAKE ONE-HALF (1/2) TABLET DAILY 30 tablet 6    spironolactone (ALDACTONE) 25 MG tablet TAKE ONE-HALF (1/2) TABLET DAILY 45 tablet 3    vitamin D (ERGOCALCIFEROL) 1.25 MG (75835 UT) capsule capsule Take 1 capsule by mouth Every 7 (Seven) Days. 3 capsule 0     No current facility-administered medications for this visit.     Facility-Administered Medications Ordered in Other Visits   Medication Dose Route Frequency Provider Last Rate Last Admin    Chlorhexidine Gluconate 2 % pads 2 each  2 pad  Apply externally BID Jasmeet Graham MD             The following portions of the patient's history were reviewed and updated as appropriate: allergies, current medications, past family history, past medical history, past social history, past surgical history and problem list.    Review of Systems:   A 14  "point review of systems was performed. All systems negative except pertinent positives/negative listed in HPI above    Physical Exam:   Vitals:    12/07/23 0954   Temp: 98 °F (36.7 °C)   TempSrc: Temporal   Weight: 60.3 kg (132 lb 14.4 oz)   Height: 165.1 cm (65\")   PainSc:   4   PainLoc: Ankle  Comment: right pubic       General: A and O x 3, ASA, NAD    SCLERA:    Normal    DENTITION:   Normal  Left lower extremity minimal tenderness over the lateral mall.  Range of motion as expected.  Motor and sensory intact distally.  Compartment soft compressible.    Right lower extremity gentle hip range of motion tolerated but does have some groin discomfort.  Motor and sensory intact distally.    Radiology:      2 views right hip AP and lateral taken reviewed show interval healing with callus formation of superior and inferior pubic rami fractures.  Hip implant appears still to be in satisfactory position no acute complications.  No other significant changes from previous films.    3 views left ankle AP lateral oblique taken reviewed show interval healing of distal fibula avulsion fracture.  Overall alignment remains satisfactory.  No significant changes from previous films.    Assessment/Plan:  3 weeks status post right inferior and superior pubic rami fracture, left distal fibula fracture    Plan to continue conservative treatment patient is protected weightbearing to the right lower extremity with a walker for another 3 weeks.  Continue weightbearing as tolerated with the boot to the left lower extremity.  Continue vitamin D for bone healing health.  Ice and elevate as needed.  Patient will follow-up in 3 weeks as long she is doing well may wean off the walker.  May remove boot place and brace to wear in her shoe for several weeks and wean out of.  May start some therapy.  All questions answered.      Martin Watson MD  12/7/2023    "

## 2023-12-07 NOTE — HOME HEALTH
Patient reports good adherence to HEP and use of walker at this time. She states she is becoming more confident with mobility throughout the home. Her f/u appointment with ortho is Thurs. DC notice was signed. Discussion of OP. Patient demonstrates no interest but will discuss with the Dr.     FALLS SINCE LAST VISIT: No    EDEMA: Min non-pitting.     PLAN FOR NEXT VISIT:  Objective measures  F/u with any order changes given by ortho  Review/progress home exercise program  Review fall risk and safety strategies

## 2023-12-08 ENCOUNTER — HOME CARE VISIT (OUTPATIENT)
Dept: HOME HEALTH SERVICES | Facility: HOME HEALTHCARE | Age: 73
End: 2023-12-08
Payer: MEDICARE

## 2023-12-08 VITALS
RESPIRATION RATE: 18 BRPM | SYSTOLIC BLOOD PRESSURE: 108 MMHG | TEMPERATURE: 97 F | DIASTOLIC BLOOD PRESSURE: 80 MMHG | HEART RATE: 94 BPM | OXYGEN SATURATION: 97 %

## 2023-12-08 PROCEDURE — G0151 HHCP-SERV OF PT,EA 15 MIN: HCPCS

## 2023-12-08 NOTE — Clinical Note
Greetings,    I wanted to inform you that Rasheeda Balderas has been discharged from [facility] PT services effective 12/8/23 with all goals achieved.    REASON FOR HOMECARE SERVICES:  RIGHT PUBIC RAMI FRACTURE & AVULSION FRACTURE OF LEFT DISTAL FIBULA  CURRENT LEVEL OF FUNCTION:  Patient is independent in self care, transfers & ambulation over level/unlevel surfaces & stairs to exit home with (L) walking boot & protected WBing (R) LE.  PROGRESS TOWARD GOALS:  Good  BARRIERS PREVENTING GOAL ACHIEVEMENT:  None  LIVING ARRANGEMENTS:  Lives with supportive spouse in single story home with 4 steps to enter  CONCERNS:  None  FOLLOW UP APPOINTMENTS/PLANS:  Dr. Watson on 12/29/23    Please feel free to contact me if you have any questions.  Hannah Choi, PT        [phone]

## 2023-12-08 NOTE — HOME HEALTH
________________________________________  PHYSICAL THERAPY DISCHARGE VISIT NOTE    SUBJECTIVE:  Patient states she's doing well.  She's on protected WBing for 3 more weeks on the (R) LE.      MEDICATION CHANGES:  None    FALLS SINCE LAST VISIT:  None    MENTAL STATUS:  Alert & oriented x 4    EDEMA:  None    WOUND / SKIN CONDITION:  Intact    DISCHARGE STATUS:  Home to self    DISCHARGE CONDITION:  Good    INSTRUCTIONS HOME PROGRAM PROVIDED:  Yes          CONTENT: Written/pictoral Home exercise program          PATIENT/CAREGIVER LEVEL OF COMPLIANCE: Good    HOME HEALTH SERVICES CONTINUING:  None

## 2023-12-29 ENCOUNTER — OFFICE VISIT (OUTPATIENT)
Dept: ORTHOPEDIC SURGERY | Facility: CLINIC | Age: 73
End: 2023-12-29
Payer: MEDICARE

## 2023-12-29 VITALS — WEIGHT: 132 LBS | HEIGHT: 65 IN | TEMPERATURE: 97.8 F | BODY MASS INDEX: 21.99 KG/M2

## 2023-12-29 DIAGNOSIS — S32.591D CLOSED FRACTURE OF MULTIPLE RAMI OF RIGHT PUBIS WITH ROUTINE HEALING, SUBSEQUENT ENCOUNTER: Primary | ICD-10-CM

## 2023-12-29 DIAGNOSIS — S82.839A AVULSION FRACTURE OF DISTAL FIBULA: ICD-10-CM

## 2023-12-29 DIAGNOSIS — R52 PAIN: ICD-10-CM

## 2023-12-29 NOTE — PROGRESS NOTES
Patient: Rasheeda Balderas  YOB: 1950 73 y.o. female  Medical Record Number: [id number]    Chief Complaint:   Chief Complaint   Patient presents with    Left Ankle - Follow-up    Right Hip - Follow-up       History of Present Illness:Rasheeda Balderas is a 73 y.o. female who presents for follow-up of right pubic rami fracture, left ankle distal fibula avulsion fracture.  She is now 6 weeks out.  Reports doing well symptoms are much improved.    Allergies:   Allergies   Allergen Reactions    Strawberry Swelling       Medications:   Current Outpatient Medications   Medication Sig Dispense Refill    acetaminophen (TYLENOL) 500 MG tablet Take 1 tablet by mouth Every 6 (Six) Hours As Needed for Mild Pain.      digoxin (LANOXIN) 125 MCG tablet TAKE 1 TABLET DAILY 90 tablet 3    Eliquis 5 MG tablet tablet TAKE 1 TABLET EVERY 12 HOURS 180 tablet 3    loratadine (CLARITIN) 10 MG tablet Take 1 tablet by mouth Daily.      metaxalone (SKELAXIN) 400 MG tablet       metoprolol succinate XL (TOPROL-XL) 25 MG 24 hr tablet TAKE ONE-HALF (1/2) TABLET DAILY 30 tablet 6    spironolactone (ALDACTONE) 25 MG tablet TAKE ONE-HALF (1/2) TABLET DAILY 45 tablet 3    vitamin D (ERGOCALCIFEROL) 1.25 MG (03262 UT) capsule capsule Take 1 capsule by mouth Every 7 (Seven) Days. 3 capsule 0     No current facility-administered medications for this visit.     Facility-Administered Medications Ordered in Other Visits   Medication Dose Route Frequency Provider Last Rate Last Admin    Chlorhexidine Gluconate 2 % pads 2 each  2 pad  Apply externally BID Jasmeet Graham MD             The following portions of the patient's history were reviewed and updated as appropriate: allergies, current medications, past family history, past medical history, past social history, past surgical history and problem list.    Review of Systems:   A 14 point review of systems was performed. All systems negative except pertinent positives/negative listed in HPI  "above    Physical Exam:   Vitals:    12/29/23 1045   Temp: 97.8 °F (36.6 °C)   TempSrc: Temporal   Weight: 59.9 kg (132 lb)   Height: 165.1 cm (65\")   PainSc:   1   PainLoc: Ankle  Comment: left ankle and right hip       General: A and O x 3, ASA, NAD    SCLERA:    Normal    DENTITION:   Normal  Right lower extremity examined gentle range of motion of the hip no pain.  Negative straight leg raises filled.    Left ankle examined no tenderness along the lateral fibula.  Range of motion near equal to contralateral side.  Motor and sensory intact distally    Radiology:   2 views right hip AP and lateral taken reviewed show interval healing callus formation about the pubic rami fractures.  Overall alignment satisfactory no other  new changes from previous films    3 views left ankle taken reviewed show apparent healed distal fibula avulsion fracture.  Overall satisfactory no other significant changes from previous films    Assessment/Plan:  6-week status post right pubic rami fracture, left distal fibula evulsion fracture    Continue conservative treatment.  At this time she can out of the boot we will place her in an ASO brace to be worn for the next 2 weeks and then going into appropriate shoewear.  She begin weaning off her walker try cane and then continue from there.  Vitamin D for bone healing health follow-up in 4 weeks all questions answered      Martin Watson MD  12/29/2023    "

## 2024-01-03 ENCOUNTER — HOSPITAL ENCOUNTER (OUTPATIENT)
Dept: CARDIOLOGY | Facility: HOSPITAL | Age: 74
Discharge: HOME | End: 2024-01-03
Admitting: INTERNAL MEDICINE
Payer: MEDICARE

## 2024-01-03 ENCOUNTER — OFFICE VISIT (OUTPATIENT)
Dept: CARDIOLOGY | Facility: CLINIC | Age: 74
End: 2024-01-03
Payer: MEDICARE

## 2024-01-03 VITALS
DIASTOLIC BLOOD PRESSURE: 78 MMHG | WEIGHT: 132 LBS | HEIGHT: 65 IN | OXYGEN SATURATION: 97 % | HEART RATE: 94 BPM | BODY MASS INDEX: 21.99 KG/M2 | SYSTOLIC BLOOD PRESSURE: 110 MMHG

## 2024-01-03 VITALS
HEART RATE: 89 BPM | BODY MASS INDEX: 21.99 KG/M2 | SYSTOLIC BLOOD PRESSURE: 110 MMHG | OXYGEN SATURATION: 100 % | WEIGHT: 132 LBS | HEIGHT: 65 IN | DIASTOLIC BLOOD PRESSURE: 74 MMHG

## 2024-01-03 DIAGNOSIS — Z98.890 S/P MVR (MITRAL VALVE REPAIR): ICD-10-CM

## 2024-01-03 DIAGNOSIS — I42.8 NICM (NONISCHEMIC CARDIOMYOPATHY): ICD-10-CM

## 2024-01-03 DIAGNOSIS — I48.19 PERSISTENT ATRIAL FIBRILLATION: ICD-10-CM

## 2024-01-03 DIAGNOSIS — I42.8 NICM (NONISCHEMIC CARDIOMYOPATHY): Primary | ICD-10-CM

## 2024-01-03 DIAGNOSIS — I34.0 NON-RHEUMATIC MITRAL REGURGITATION: ICD-10-CM

## 2024-01-03 DIAGNOSIS — I48.0 PAROXYSMAL ATRIAL FIBRILLATION: ICD-10-CM

## 2024-01-03 LAB
AORTIC ARCH: 2.8 CM
AORTIC DIMENSIONLESS INDEX: 0.7 (DI)
ASCENDING AORTA: 3.3 CM
BH CV ECHO MEAS - ACS: 1.87 CM
BH CV ECHO MEAS - AO MAX PG: 5.2 MMHG
BH CV ECHO MEAS - AO MEAN PG: 2.6 MMHG
BH CV ECHO MEAS - AO ROOT DIAM: 3.1 CM
BH CV ECHO MEAS - AO V2 MAX: 114.4 CM/SEC
BH CV ECHO MEAS - AO V2 VTI: 16.2 CM
BH CV ECHO MEAS - EDV(CUBED): 85.2 ML
BH CV ECHO MEAS - EDV(MOD-SP2): 75 ML
BH CV ECHO MEAS - EDV(MOD-SP4): 85 ML
BH CV ECHO MEAS - EF(MOD-BP): 57.4 %
BH CV ECHO MEAS - EF(MOD-SP2): 58.7 %
BH CV ECHO MEAS - EF(MOD-SP4): 57.6 %
BH CV ECHO MEAS - EF_3D-VOL: 57 %
BH CV ECHO MEAS - ESV(CUBED): 32.7 ML
BH CV ECHO MEAS - ESV(MOD-SP2): 31 ML
BH CV ECHO MEAS - ESV(MOD-SP4): 36 ML
BH CV ECHO MEAS - FS: 27.3 %
BH CV ECHO MEAS - IVS/LVPW: 1.1 CM
BH CV ECHO MEAS - IVSD: 1.1 CM
BH CV ECHO MEAS - LA 3D VOL INDEX: 62
BH CV ECHO MEAS - LAT PEAK E' VEL: 7.2 CM/SEC
BH CV ECHO MEAS - LV DIASTOLIC VOL/BSA (35-75): 51.3 CM2
BH CV ECHO MEAS - LV MASS(C)D: 158.2 GRAMS
BH CV ECHO MEAS - LV MAX PG: 2.02 MMHG
BH CV ECHO MEAS - LV MEAN PG: 1.21 MMHG
BH CV ECHO MEAS - LV SYSTOLIC VOL/BSA (12-30): 21.7 CM2
BH CV ECHO MEAS - LV V1 MAX: 71.1 CM/SEC
BH CV ECHO MEAS - LV V1 VTI: 11.2 CM
BH CV ECHO MEAS - LVIDD: 4.4 CM
BH CV ECHO MEAS - LVIDS: 3.2 CM
BH CV ECHO MEAS - LVOT AREA: 2.8 CM2
BH CV ECHO MEAS - LVOT DIAM: 1.88 CM
BH CV ECHO MEAS - LVPWD: 1 CM
BH CV ECHO MEAS - MED PEAK E' VEL: 6 CM/SEC
BH CV ECHO MEAS - MV DEC SLOPE: 476.7 CM/SEC2
BH CV ECHO MEAS - MV DEC TIME: 0.4 SEC
BH CV ECHO MEAS - MV E MAX VEL: 121 CM/SEC
BH CV ECHO MEAS - MV MAX PG: 8.8 MMHG
BH CV ECHO MEAS - MV MEAN PG: 3.3 MMHG
BH CV ECHO MEAS - MV P1/2T: 63.8 MSEC
BH CV ECHO MEAS - MV V2 VTI: 32.8 CM
BH CV ECHO MEAS - MVA(P1/2T): 3.4 CM2
BH CV ECHO MEAS - MVA(VTI): 0.94 CM2
BH CV ECHO MEAS - PA ACC TIME: 0.05 SEC
BH CV ECHO MEAS - PA V2 MAX: 71.9 CM/SEC
BH CV ECHO MEAS - PULM A REVS DUR: 0.09 SEC
BH CV ECHO MEAS - PULM DIAS VEL: 45 CM/SEC
BH CV ECHO MEAS - PULM S/D: 0.42
BH CV ECHO MEAS - PULM SYS VEL: 18.9 CM/SEC
BH CV ECHO MEAS - QP/QS: 1.23
BH CV ECHO MEAS - RAP SYSTOLE: 3 MMHG
BH CV ECHO MEAS - RV MAX PG: 0.94 MMHG
BH CV ECHO MEAS - RV V1 MAX: 48.4 CM/SEC
BH CV ECHO MEAS - RV V1 VTI: 7.9 CM
BH CV ECHO MEAS - RVOT DIAM: 2.48 CM
BH CV ECHO MEAS - RVSP: 21.4 MMHG
BH CV ECHO MEAS - SI(MOD-SP2): 26.5 ML/M2
BH CV ECHO MEAS - SI(MOD-SP4): 29.6 ML/M2
BH CV ECHO MEAS - SUP REN AO DIAM: 2 CM
BH CV ECHO MEAS - SV(LVOT): 31 ML
BH CV ECHO MEAS - SV(MOD-SP2): 44 ML
BH CV ECHO MEAS - SV(MOD-SP4): 49 ML
BH CV ECHO MEAS - SV(RVOT): 38.1 ML
BH CV ECHO MEAS - TAPSE (>1.6): 1.61 CM
BH CV ECHO MEAS - TR MAX PG: 18.4 MMHG
BH CV ECHO MEAS - TR MAX VEL: 214.5 CM/SEC
BH CV ECHO MEASUREMENTS AVERAGE E/E' RATIO: 18.33
BH CV VAS BP RIGHT ARM: NORMAL MMHG
BH CV XLRA - RV BASE: 2.9 CM
BH CV XLRA - RV LENGTH: 6.3 CM
BH CV XLRA - RV MID: 2.9 CM
BH CV XLRA - TDI S': 11.9 CM/SEC
LEFT ATRIUM VOLUME INDEX: 59 ML/M2
MAXIMAL PREDICTED HEART RATE: 148 BPM
SINUS: 3.2 CM
STJ: 2.7 CM
STRESS TARGET HR: 126 BPM

## 2024-01-03 PROCEDURE — 25510000001 PERFLUTREN (DEFINITY) 8.476 MG IN SODIUM CHLORIDE (PF) 0.9 % 10 ML INJECTION: Performed by: INTERNAL MEDICINE

## 2024-01-03 PROCEDURE — 99214 OFFICE O/P EST MOD 30 MIN: CPT | Performed by: NURSE PRACTITIONER

## 2024-01-03 PROCEDURE — 93306 TTE W/DOPPLER COMPLETE: CPT

## 2024-01-03 PROCEDURE — 93000 ELECTROCARDIOGRAM COMPLETE: CPT | Performed by: NURSE PRACTITIONER

## 2024-01-03 PROCEDURE — 1159F MED LIST DOCD IN RCRD: CPT | Performed by: NURSE PRACTITIONER

## 2024-01-03 PROCEDURE — 1160F RVW MEDS BY RX/DR IN RCRD: CPT | Performed by: NURSE PRACTITIONER

## 2024-01-03 RX ADMIN — PERFLUTREN 1.5 ML: 6.52 INJECTION, SUSPENSION INTRAVENOUS at 08:55

## 2024-01-03 NOTE — PROGRESS NOTES
Date of Office Visit: 2024  Encounter Provider: RICHA Hutton  Place of Service: [facility] CARDIOLOGY  Patient Name: Rasheeda Balderas  :1950    Chief Complaint   Patient presents with    Nonrheumatic mitral valve regurgitation     1 yr f/u   :     HPI: Rasheeda Balderas is a 73 y.o. female patient of Dr. Hand's with paroxysmal atrial fibrillation, nonischemic cardiomyopathy, and a history of mitral valve regurgitation (status post mitral valve repair at the [facility] in ).    She was last seen in the office by Dr. Hand in January at which time she was doing well.  She reported mild dizziness with position change.  No changes were made to her regimen, and she was advised to follow-up in 1 year with a repeat echocardiogram.    From a cardiac standpoint, she has been doing well.  She denies any chest pain, shortness breath, palpitations, edema, syncope, bleeding difficulties or melena.  In 2023, she fell while on a walk.  She came to the hospital where she was found to have pelvic and ankle fractures.  Conservative management was recommended.      Past Medical History:   Diagnosis Date    Arthritis     Atrial fibrillation     Bone spur     CAD (coronary artery disease)     Heartburn     Mitral valve prolapse syndrome     Myocardial infarction     pt is not aware of history of MI    Osteoporosis     PONV (postoperative nausea and vomiting)     Right hip pain        Past Surgical History:   Procedure Laterality Date    CARDIAC CATHETERIZATION N/A 3/7/2019    Procedure: Right and Left Heart Cath;  Surgeon: Cam Hand MD;  Location: [facility] CATH INVASIVE LOCATION;  Service: Cardiology    CARDIAC CATHETERIZATION N/A 3/7/2019    Procedure: Coronary angiography;  Surgeon: Cam Hand MD;  Location: [facility] CATH INVASIVE LOCATION;  Service: Cardiology    CARDIAC CATHETERIZATION N/A 3/7/2019    Procedure: Left ventriculography;  Surgeon:  Cam Hand MD;  Location: [facility] CATH INVASIVE LOCATION;  Service: Cardiology    COLONOSCOPY N/A 4/7/2021    Procedure: COLONOSCOPY TO CECUM;  Surgeon: Mary Tsai MD;  Location: [facility] ENDOSCOPY;  Service: Gastroenterology;  Laterality: N/A;  PRE: SCREENING, FAMILY HX COLON POLYPS  POST: DIVERTICULOSIS, HEMORRHOIDS    LAPAROSCOPIC CHOLECYSTECTOMY W/ CHOLANGIOGRAPHY      MITRAL VALVE REPLACEMENT      SHOULDER SURGERY Left     TOTAL HIP ARTHROPLASTY Right 5/10/2021    Procedure: TOTAL HIP ARTHROPLASTY ANTERIOR WITH HANA TABLE;  Surgeon: Jasmeet Graham MD;  Location: [facility] MAIN OR;  Service: Orthopedics;  Laterality: Right;       Social History     Socioeconomic History    Marital status:    Tobacco Use    Smoking status: Former     Packs/day: .5     Types: Cigarettes     Quit date: 2009     Years since quitting: 15.0    Smokeless tobacco: Never   Vaping Use    Vaping Use: Never used   Substance and Sexual Activity    Alcohol use: Yes     Alcohol/week: 3.0 standard drinks of alcohol     Types: 1 Glasses of wine, 1 Cans of beer, 1 Shots of liquor per week     Comment: 8 PER WEEK    Drug use: No    Sexual activity: Defer       Family History   Problem Relation Age of Onset    Brain cancer Mother     Heart disease Father     Brain cancer Father     Stomach cancer Maternal Grandmother     Cancer Maternal Grandfather     No Known Problems Paternal Grandmother     No Known Problems Paternal Grandfather     Malig Hyperthermia Neg Hx        Review of Systems   Constitutional: Negative.   Cardiovascular: Negative.  Negative for chest pain, dyspnea on exertion, leg swelling, orthopnea, paroxysmal nocturnal dyspnea and syncope.   Respiratory: Negative.     Hematologic/Lymphatic: Negative for bleeding problem.   Musculoskeletal:  Negative for falls.   Gastrointestinal:  Negative for melena.   Neurological:  Positive for dizziness. Negative for light-headedness.       Allergies   Allergen Reactions     "Strawberry Swelling         Current Outpatient Medications:     acetaminophen (TYLENOL) 500 MG tablet, Take 1 tablet by mouth Every 6 (Six) Hours As Needed for Mild Pain., Disp: , Rfl:     digoxin (LANOXIN) 125 MCG tablet, TAKE 1 TABLET DAILY, Disp: 90 tablet, Rfl: 3    Eliquis 5 MG tablet tablet, TAKE 1 TABLET EVERY 12 HOURS, Disp: 180 tablet, Rfl: 3    loratadine (CLARITIN) 10 MG tablet, Take 1 tablet by mouth Daily., Disp: , Rfl:     metaxalone (SKELAXIN) 400 MG tablet, , Disp: , Rfl:     metoprolol succinate XL (TOPROL-XL) 25 MG 24 hr tablet, TAKE ONE-HALF (1/2) TABLET DAILY, Disp: 30 tablet, Rfl: 6    spironolactone (ALDACTONE) 25 MG tablet, TAKE ONE-HALF (1/2) TABLET DAILY, Disp: 45 tablet, Rfl: 3    vitamin D (ERGOCALCIFEROL) 1.25 MG (64184 UT) capsule capsule, Take 1 capsule by mouth Every 7 (Seven) Days., Disp: 3 capsule, Rfl: 0  No current facility-administered medications for this visit.    Facility-Administered Medications Ordered in Other Visits:     Chlorhexidine Gluconate 2 % pads 2 each, 2 pad , Apply externally, BID, Jasmeet Graham MD      Objective:     Vitals:    01/03/24 0902   BP: 110/74   BP Location: Left arm   Patient Position: Sitting   Pulse: 89   SpO2: 100%   Weight: 59.9 kg (132 lb)   Height: 165.1 cm (65\")     Body mass index is 21.97 kg/m².    PHYSICAL EXAM:    Neck:      Vascular: No JVD.   Pulmonary:      Effort: Pulmonary effort is normal.      Breath sounds: Normal breath sounds.   Cardiovascular:      Normal rate. Irregular rhythm.      Murmurs: There is no murmur.      No gallop.  No click. No rub.   Pulses:     Intact distal pulses.           ECG 12 Lead    Date/Time: 1/3/2024 9:24 AM  Performed by: Teresita Leone APRN    Authorized by: Teresita Leone APRN  Comparison: compared with previous ECG from 1/3/2023  Similar to previous ECG  Rhythm: atrial fibrillation  Rate: normal  BPM: 96  Other findings: non-specific ST-T wave changes            Assessment:       " Diagnosis Plan   1. NICM (nonischemic cardiomyopathy)        2. Non-rheumatic mitral regurgitation        3. S/P MVR (mitral valve repair)        4. Persistent atrial fibrillation  ECG 12 Lead        Orders Placed This Encounter   Procedures    ECG 12 Lead     This order was created via procedure documentation     Order Specific Question:   Release to patient     Answer:   Routine Release [[id number]]          Plan:       1.  Nonischemic cardiomyopathy.  Echocardiogram from 2022 demonstrated an EF of 45%.  Repeat echocardiogram today is pending.  She is on Toprol and spironolactone.  We have been unable to uptitrate her medications in the past due to low blood pressures.  She denies any symptoms of heart failure.      2.  Mitral regurgitation.  Status post mitral valve repair.  Repeat echocardiogram today is pending.      3.  Atrial fibrillation.  She is rate controlled with metoprolol and digoxin and anticoagulated with Eliquis.      I think she is doing well.  We will call her with her echocardiogram results.  Otherwise, she will follow-up with Dr. Hand in 1 year.      As always, it has been a pleasure to participate in your patient's care.      Sincerely,         RICHA Christine

## 2024-01-05 ENCOUNTER — TELEPHONE (OUTPATIENT)
Dept: CARDIOLOGY | Facility: CLINIC | Age: 74
End: 2024-01-05
Payer: MEDICARE

## 2024-01-05 NOTE — TELEPHONE ENCOUNTER
----- Message from Cam Hand MD sent at 1/4/2024  4:40 PM EST -----  Well I called her and the good news is is that her echo looks better and her LV function is normal I am not sure what to make of the wall motion area she is asymptomatic I would probably treat her medically following along the guidelines of the revive LV trial

## 2024-02-06 ENCOUNTER — APPOINTMENT (OUTPATIENT)
Dept: WOMENS IMAGING | Facility: HOSPITAL | Age: 74
End: 2024-02-06
Payer: MEDICARE

## 2024-02-06 PROCEDURE — 77063 BREAST TOMOSYNTHESIS BI: CPT | Performed by: RADIOLOGY

## 2024-02-06 PROCEDURE — 77067 SCR MAMMO BI INCL CAD: CPT | Performed by: RADIOLOGY

## 2024-03-13 ENCOUNTER — TRANSCRIBE ORDERS (OUTPATIENT)
Dept: ADMINISTRATIVE | Facility: HOSPITAL | Age: 74
End: 2024-03-13
Payer: MEDICARE

## 2024-03-13 DIAGNOSIS — M85.80 OSTEOPENIA, UNSPECIFIED LOCATION: Primary | ICD-10-CM

## 2024-04-17 RX ORDER — SODIUM CHLORIDE 9 MG/ML
20 INJECTION, SOLUTION INTRAVENOUS ONCE
Status: CANCELLED | OUTPATIENT
Start: 2024-04-22

## 2024-04-17 RX ORDER — ZOLEDRONIC ACID 5 MG/100ML
5 INJECTION, SOLUTION INTRAVENOUS ONCE
Status: CANCELLED | OUTPATIENT
Start: 2024-04-22

## 2024-04-22 ENCOUNTER — HOSPITAL ENCOUNTER (OUTPATIENT)
Dept: INFUSION THERAPY | Facility: HOSPITAL | Age: 74
Discharge: HOME | End: 2024-04-22
Admitting: INTERNAL MEDICINE
Payer: MEDICARE

## 2024-04-22 VITALS
DIASTOLIC BLOOD PRESSURE: 73 MMHG | SYSTOLIC BLOOD PRESSURE: 124 MMHG | HEART RATE: 52 BPM | BODY MASS INDEX: 21.63 KG/M2 | OXYGEN SATURATION: 100 % | TEMPERATURE: 97.1 F | WEIGHT: 130 LBS

## 2024-04-22 DIAGNOSIS — M85.80 OSTEOPENIA, UNSPECIFIED LOCATION: Primary | ICD-10-CM

## 2024-04-22 DIAGNOSIS — M81.0 AGE RELATED OSTEOPOROSIS, UNSPECIFIED PATHOLOGICAL FRACTURE PRESENCE: ICD-10-CM

## 2024-04-22 PROCEDURE — 96374 THER/PROPH/DIAG INJ IV PUSH: CPT

## 2024-04-22 PROCEDURE — 25010000002 ZOLEDRONIC ACID 5 MG/100ML SOLUTION: Performed by: INTERNAL MEDICINE

## 2024-04-22 RX ORDER — ZOLEDRONIC ACID 5 MG/100ML
5 INJECTION, SOLUTION INTRAVENOUS ONCE
Status: COMPLETED | OUTPATIENT
Start: 2024-04-22 | End: 2024-04-22

## 2024-04-22 RX ORDER — SODIUM CHLORIDE 9 MG/ML
20 INJECTION, SOLUTION INTRAVENOUS ONCE
OUTPATIENT
Start: 2024-04-22

## 2024-04-22 RX ORDER — ZOLEDRONIC ACID 5 MG/100ML
5 INJECTION, SOLUTION INTRAVENOUS ONCE
COMMUNITY

## 2024-04-22 RX ORDER — ZOLEDRONIC ACID 5 MG/100ML
5 INJECTION, SOLUTION INTRAVENOUS ONCE
Status: CANCELLED | OUTPATIENT
Start: 2024-04-22

## 2024-04-22 RX ADMIN — ZOLEDRONIC ACID 5 MG: 0.05 INJECTION, SOLUTION INTRAVENOUS at 12:22

## 2024-07-16 RX ORDER — METOPROLOL SUCCINATE 25 MG/1
TABLET, EXTENDED RELEASE ORAL
Qty: 45 TABLET | Refills: 3 | Status: SHIPPED | OUTPATIENT
Start: 2024-07-16

## 2024-10-28 RX ORDER — APIXABAN 5 MG/1
TABLET, FILM COATED ORAL
Qty: 180 TABLET | Refills: 3 | Status: SHIPPED | OUTPATIENT
Start: 2024-10-28

## 2024-10-28 RX ORDER — SPIRONOLACTONE 25 MG/1
TABLET ORAL
Qty: 45 TABLET | Refills: 3 | Status: SHIPPED | OUTPATIENT
Start: 2024-10-28

## 2025-01-15 ENCOUNTER — OFFICE VISIT (OUTPATIENT)
Dept: CARDIOLOGY | Facility: CLINIC | Age: 75
End: 2025-01-15
Payer: MEDICARE

## 2025-01-15 VITALS
WEIGHT: 132.2 LBS | BODY MASS INDEX: 22.02 KG/M2 | SYSTOLIC BLOOD PRESSURE: 100 MMHG | HEART RATE: 72 BPM | HEIGHT: 65 IN | DIASTOLIC BLOOD PRESSURE: 70 MMHG

## 2025-01-15 DIAGNOSIS — I48.19 PERSISTENT ATRIAL FIBRILLATION: ICD-10-CM

## 2025-01-15 DIAGNOSIS — I42.8 NICM (NONISCHEMIC CARDIOMYOPATHY): Primary | ICD-10-CM

## 2025-01-15 DIAGNOSIS — Z98.890 S/P MVR (MITRAL VALVE REPAIR): ICD-10-CM

## 2025-01-15 PROCEDURE — 93000 ELECTROCARDIOGRAM COMPLETE: CPT | Performed by: INTERNAL MEDICINE

## 2025-01-15 PROCEDURE — 99214 OFFICE O/P EST MOD 30 MIN: CPT | Performed by: INTERNAL MEDICINE

## 2025-01-15 RX ORDER — CYCLOBENZAPRINE HCL 10 MG
10 TABLET ORAL 3 TIMES DAILY PRN
COMMUNITY

## 2025-01-15 RX ORDER — ROSUVASTATIN CALCIUM 5 MG/1
5 TABLET, COATED ORAL DAILY
COMMUNITY
Start: 2024-12-16

## 2025-01-15 NOTE — PROGRESS NOTES
Date of Office Visit: 01/15/25  Encounter Provider: Cam Hand MD  Place of Service: [facility] CARDIOLOGY  Patient Name: Rasheeda Balderas  :1950  [id number]    Chief Complaint   Patient presents with    Atrial Fibrillation   :     HPI: Rasheeda Balderas is a 74 y.o. female  initially had paroxysmal A. fib we found that she had severe mitral insufficiency no significant coronary disease preserved LV function at that time and in 2017 she went to the [facility] where she had a ring annuloplasty and an Drew stitch placed in her mitral valve.  She did well until 2020 when she showed up with shortness of breath elevated proBNP and reduced LV function with an EF of about 40%.  She was started on some medical therapy for her cardiomyopathy but she cannot tolerate much because she does not have a much in the way of blood pressure.  She had an echo in 2024 with normal ejection fraction and the valve looked fine.    She is here for follow-up and she is doing well she is not having any chest pain shortness of breath PND orthopnea edema occasionally when she stands up she gets lightheaded but she has not any syncope or palpitations no difficulty with dehydration no nausea vomiting    Past Medical History:   Diagnosis Date    Arthritis     Atrial fibrillation     Bone spur     CAD (coronary artery disease)     Heartburn     Mitral valve prolapse syndrome     Myocardial infarction     pt is not aware of history of MI    Osteoporosis     PONV (postoperative nausea and vomiting)     Right hip pain        Past Surgical History:   Procedure Laterality Date    CARDIAC CATHETERIZATION N/A 3/7/2019    Procedure: Right and Left Heart Cath;  Surgeon: Cam Hand MD;  Location: [facility] C[facility]ASIVE LOC[facility];  Service: Cardiology    CARDIAC CATHETERIZATION N/A 3/7/2019    Procedure: Coronary angiography;  Surgeon: Cam Hand MD;  Location: [facility] CATH INVASIVE  LOCATION;  Service: Cardiology    CARDIAC CATHETERIZATION N/A 3/7/2019    Procedure: Left ventriculography;  Surgeon: Cam Hand MD;  Location: [facility] CATH INVASIVE LOCATION;  Service: Cardiology    COLONOSCOPY N/A 2021    Procedure: COLONOSCOPY TO CECUM;  Surgeon: Mary Tsai MD;  Location: [facility] ENDOSCOPY;  Service: Gastroenterology;  Laterality: N/A;  PRE: SCREENING, FAMILY HX COLON POLYPS  POST: DIVERTICULOSIS, HEMORRHOIDS    LAPAROSCOPIC CHOLECYSTECTOMY W/ CHOLANGIOGRAPHY      MITRAL VALVE REPLACEMENT      SHOULDER SURGERY Left     TOTAL HIP ARTHROPLASTY Right 5/10/2021    Procedure: TOTAL HIP ARTHROPLASTY ANTERIOR WITH HANA TABLE;  Surgeon: Jasmeet Graham MD;  Location: [facility] MAIN OR;  Service: Orthopedics;  Laterality: Right;       Social History     Socioeconomic History    Marital status:    Tobacco Use    Smoking status: Former     Current packs/day: 0.00     Types: Cigarettes     Quit date:      Years since quittin.0    Smokeless tobacco: Never   Vaping Use    Vaping status: Never Used   Substance and Sexual Activity    Alcohol use: Yes     Alcohol/week: 3.0 standard drinks of alcohol     Types: 1 Glasses of wine, 1 Cans of beer, 1 Shots of liquor per week     Comment: 8 PER WEEK    Drug use: No    Sexual activity: Defer       Family History   Problem Relation Age of Onset    Brain cancer Mother     Heart disease Father     Brain cancer Father     Stomach cancer Maternal Grandmother     Cancer Maternal Grandfather     No Known Problems Paternal Grandmother     No Known Problems Paternal Grandfather     Malig Hyperthermia Neg Hx        Review of Systems   Constitutional: Negative for decreased appetite, fever, malaise/fatigue and weight loss.   HENT:  Negative for nosebleeds.    Eyes:  Negative for double vision.   Cardiovascular:  Negative for chest pain, claudication, cyanosis, dyspnea on exertion, irregular heartbeat, leg swelling, near-syncope, orthopnea,  palpitations, paroxysmal nocturnal dyspnea and syncope.   Respiratory:  Negative for cough, hemoptysis and shortness of breath.    Hematologic/Lymphatic: Negative for bleeding problem.   Skin:  Negative for rash.   Musculoskeletal:  Negative for falls and myalgias.   Gastrointestinal:  Negative for hematochezia, jaundice, melena, nausea and vomiting.   Genitourinary:  Negative for hematuria.   Neurological:  Negative for dizziness and seizures.   Psychiatric/Behavioral:  Negative for altered mental status and memory loss.        Allergies   Allergen Reactions    Strawberry Swelling         Current Outpatient Medications:     acetaminophen (TYLENOL) 500 MG tablet, Take 1 tablet by mouth Every 6 (Six) Hours As Needed for Mild Pain., Disp: , Rfl:     cyclobenzaprine (FLEXERIL) 10 MG tablet, Take 1 tablet by mouth 3 (Three) Times a Day As Needed for Muscle Spasms., Disp: , Rfl:     digoxin (LANOXIN) 125 MCG tablet, TAKE 1 TABLET DAILY, Disp: 90 tablet, Rfl: 3    Eliquis 5 MG tablet tablet, TAKE 1 TABLET EVERY 12 HOURS, Disp: 180 tablet, Rfl: 3    loratadine (CLARITIN) 10 MG tablet, Take 1 tablet by mouth Daily., Disp: , Rfl:     metoprolol succinate XL (TOPROL-XL) 25 MG 24 hr tablet, TAKE ONE-HALF (1/2) TABLET DAILY, Disp: 45 tablet, Rfl: 3    rosuvastatin (CRESTOR) 5 MG tablet, Take 1 tablet by mouth Daily., Disp: , Rfl:     spironolactone (ALDACTONE) 25 MG tablet, TAKE ONE-HALF (1/2) TABLET DAILY, Disp: 45 tablet, Rfl: 3    vitamin D (ERGOCALCIFEROL) 1.25 MG (08057 UT) capsule capsule, Take 1 capsule by mouth Every 7 (Seven) Days., Disp: 3 capsule, Rfl: 0    zoledronic acid (Reclast) 5 MG/100ML solution infusion, Infuse 100 mL into a venous catheter 1 (One) Time., Disp: , Rfl:     metaxalone (SKELAXIN) 400 MG tablet, , Disp: , Rfl:   No current facility-administered medications for this visit.    Facility-Administered Medications Ordered in Other Visits:     Chlorhexidine Gluconate 2 % pads 2 each, 2 pad , Apply  "externally, Santos LIN Reid B, MD      Objective:     Vitals:    01/15/25 1112   BP: 100/70   Pulse: 72   Weight: 60 kg (132 lb 3.2 oz)   Height: 165.1 cm (65\")     Body mass index is 22 kg/m².    Constitutional:       Appearance: Well-developed.   Eyes:      General: No scleral icterus.  HENT:      Head: Normocephalic.   Neck:      Thyroid: No thyromegaly.      Vascular: No JVD.      Lymphadenopathy: No cervical adenopathy.   Pulmonary:      Effort: Pulmonary effort is normal.      Breath sounds: Normal breath sounds. No wheezing. No rales.   Cardiovascular:      Normal rate. Irregularly irregular rhythm.      No gallop.    Edema:     Peripheral edema absent.   Abdominal:      Palpations: Abdomen is soft.      Tenderness: There is no abdominal tenderness.   Musculoskeletal: Normal range of motion. Skin:     General: Skin is warm and dry.      Findings: No rash.   Neurological:      Mental Status: Alert and oriented to person, place, and time.           ECG 12 Lead    Date/Time: 1/15/2025 11:46 AM  Performed by: Cam Hand MD    Authorized by: Cam Hand MD  Comparison: compared with previous ECG   Similar to previous ECG  Rhythm: atrial fibrillation  Other findings: non-specific ST-T wave changes    Clinical impression: abnormal EKG           Assessment:       Diagnosis Plan   1. NICM (nonischemic cardiomyopathy)        2. S/P MVR (mitral valve repair)        3. Persistent atrial fibrillation               Plan:       While I think she is doing well she does not have any symptoms of heart failure her blood pressure is lowish I am going to stop the Aldactone today I am going to have her stay on the other meds I cautioned her about getting dehydrated when her last LV function was normal so that is great I just have her come back and see us in a year sooner if she has trouble    No follow-ups on file.     As always, it has been a pleasure to participate in your patient's care.      Sincerely, "       Cam Hand MD

## 2025-03-19 ENCOUNTER — TRANSCRIBE ORDERS (OUTPATIENT)
Dept: ADMINISTRATIVE | Facility: HOSPITAL | Age: 75
End: 2025-03-19
Payer: MEDICARE

## 2025-03-19 DIAGNOSIS — M85.80 OSTEOPENIA OF THE ELDERLY: Primary | ICD-10-CM

## 2025-04-22 RX ORDER — ZOLEDRONIC ACID 0.05 MG/ML
5 INJECTION, SOLUTION INTRAVENOUS ONCE
Status: CANCELLED | OUTPATIENT
Start: 2025-04-25

## 2025-04-25 ENCOUNTER — HOSPITAL ENCOUNTER (OUTPATIENT)
Dept: INFUSION THERAPY | Facility: HOSPITAL | Age: 75
Discharge: HOME | End: 2025-04-25
Payer: MEDICARE

## 2025-04-25 VITALS
HEART RATE: 66 BPM | DIASTOLIC BLOOD PRESSURE: 76 MMHG | OXYGEN SATURATION: 98 % | BODY MASS INDEX: 21.97 KG/M2 | SYSTOLIC BLOOD PRESSURE: 119 MMHG | RESPIRATION RATE: 20 BRPM | WEIGHT: 132 LBS

## 2025-04-25 DIAGNOSIS — M85.80 OSTEOPENIA, UNSPECIFIED LOCATION: Primary | ICD-10-CM

## 2025-04-25 LAB
ALBUMIN SERPL-MCNC: 4.3 G/DL (ref 3.5–5.2)
ALBUMIN/GLOB SERPL: 1.7 G/DL
ALP SERPL-CCNC: 69 U/L (ref 39–117)
ALT SERPL W P-5'-P-CCNC: 19 U/L (ref 1–33)
ANION GAP SERPL CALCULATED.3IONS-SCNC: 9 MMOL/L (ref 5–15)
AST SERPL-CCNC: 23 U/L (ref 1–32)
BILIRUB SERPL-MCNC: 0.8 MG/DL (ref 0–1.2)
BUN SERPL-MCNC: 15 MG/DL (ref 8–23)
BUN/CREAT SERPL: 19 (ref 7–25)
CALCIUM SPEC-SCNC: 9.5 MG/DL (ref 8.6–10.5)
CHLORIDE SERPL-SCNC: 104 MMOL/L (ref 98–107)
CO2 SERPL-SCNC: 25 MMOL/L (ref 22–29)
CREAT SERPL-MCNC: 0.79 MG/DL (ref 0.57–1)
EGFRCR SERPLBLD CKD-EPI 2021: 79.1 ML/MIN/1.73
GLOBULIN UR ELPH-MCNC: 2.5 GM/DL
GLUCOSE SERPL-MCNC: 97 MG/DL (ref 65–99)
POTASSIUM SERPL-SCNC: 4.2 MMOL/L (ref 3.5–5.2)
PROT SERPL-MCNC: 6.8 G/DL (ref 6–8.5)
SODIUM SERPL-SCNC: 138 MMOL/L (ref 136–145)

## 2025-04-25 PROCEDURE — 96374 THER/PROPH/DIAG INJ IV PUSH: CPT

## 2025-04-25 PROCEDURE — 36415 COLL VENOUS BLD VENIPUNCTURE: CPT

## 2025-04-25 PROCEDURE — 80053 COMPREHEN METABOLIC PANEL: CPT | Performed by: INTERNAL MEDICINE

## 2025-04-25 PROCEDURE — 25010000002 ZOLEDRONIC ACID 5 MG/100ML SOLUTION: Performed by: INTERNAL MEDICINE

## 2025-04-25 RX ORDER — ZOLEDRONIC ACID 0.05 MG/ML
5 INJECTION, SOLUTION INTRAVENOUS ONCE
Status: COMPLETED | OUTPATIENT
Start: 2025-04-25 | End: 2025-04-25

## 2025-04-25 RX ORDER — ZOLEDRONIC ACID 0.05 MG/ML
5 INJECTION, SOLUTION INTRAVENOUS ONCE
Status: CANCELLED | OUTPATIENT
Start: 2025-04-25

## 2025-04-25 RX ADMIN — ZOLEDRONIC ACID 5 MG: 5 INJECTION, SOLUTION INTRAVENOUS at 13:12

## (undated) DEVICE — GLV SURG SENSICARE PI MIC PF SZ7 LF STRL

## (undated) DEVICE — CATH VENT MIV RADL PIG ST TIP 4F 110CM

## (undated) DEVICE — SYS CLS SKIN PREMIERPRO EXOFINFUSION 22CM

## (undated) DEVICE — GLIDESHEATH BASIC HYDROPHILIC COATED INTRODUCER SHEATH: Brand: GLIDESHEATH

## (undated) DEVICE — CATH DIAG CARD PERFORM JR4.0 BT 4F100CM

## (undated) DEVICE — PK ANT HIP 40

## (undated) DEVICE — TUBING, SUCTION, 1/4" X 10', STRAIGHT: Brand: MEDLINE

## (undated) DEVICE — MEDI-VAC YANKAUER SUCTION HANDLE W/BULBOUS TIP: Brand: CARDINAL HEALTH

## (undated) DEVICE — ANTIBACTERIAL UNDYED BRAIDED (POLYGLACTIN 910), SYNTHETIC ABSORBABLE SUTURE: Brand: COATED VICRYL

## (undated) DEVICE — ADHS SKIN SURG TISS VISC PREMIERPRO EXOFIN HI/VISC FAST/DRY

## (undated) DEVICE — BALN PRESS WEDGE 5F 110CM

## (undated) DEVICE — SUT VIC 0 CT1 36IN J946H

## (undated) DEVICE — Device

## (undated) DEVICE — KT ORCA ORCAPOD DISP STRL

## (undated) DEVICE — SUT PDS 1 CT1 36IN Z347H

## (undated) DEVICE — TRAP FLD MINIVAC MEGADYNE 100ML

## (undated) DEVICE — PREMIUM WET SKIN PREP TRAY: Brand: MEDLINE INDUSTRIES, INC.

## (undated) DEVICE — STPLR SKIN VISISTAT WD 35CT

## (undated) DEVICE — GLV SURG SENSICARE W/ALOE PF LF 8 STRL

## (undated) DEVICE — KT MANIFLD CARDIAC

## (undated) DEVICE — APPL DURAPREP IODOPHOR APL 26ML

## (undated) DEVICE — PK CATH CARD 40

## (undated) DEVICE — GLV SURG PREMIERPRO ORTHO LTX PF SZ7.5 BRN

## (undated) DEVICE — THE TORRENT IRRIGATION SCOPE CONNECTOR IS USED WITH THE TORRENT IRRIGATION TUBING TO PROVIDE IRRIGATION FLUIDS SUCH AS STERILE WATER DURING GASTROINTESTINAL ENDOSCOPIC PROCEDURES WHEN USED IN CONJUNCTION WITH AN IRRIGATION PUMP (OR ELECTROSURGICAL UNIT).: Brand: TORRENT

## (undated) DEVICE — SENSR O2 OXIMAX FNGR A/ 18IN NONSTR

## (undated) DEVICE — SUT VICRYL 1 CT1 27IN  JJ40G

## (undated) DEVICE — PENCL E/S ULTRAVAC TELESCP NOSE HOLSTR 10FT

## (undated) DEVICE — CANN O2 ETCO2 FITS ALL CONN CO2 SMPL A/ 7IN DISP LF

## (undated) DEVICE — GLV SURG SENSICARE W/ALOE PF LF 7.5 STRL

## (undated) DEVICE — ADAPT CLN BIOGUARD AIR/H2O DISP

## (undated) DEVICE — PREP SOL POVIDONE/IODINE BT 4OZ

## (undated) DEVICE — GW HITORQUE/BAL MID/WT J W/HCOAT .014 3X190CM

## (undated) DEVICE — LN SMPL CO2 SHTRM SD STREAM W/M LUER

## (undated) DEVICE — CATH DIAG CARD PERFORMA JL3.5 BT 4F100CM

## (undated) DEVICE — GW EMR FIX EXCHG J STD .035 3MM 260CM

## (undated) DEVICE — GLV SURG SENSICARE PI PF LF 7 GRN STRL

## (undated) DEVICE — MAT FLR ABSORBENT LG 4FT 10 2.5FT